# Patient Record
Sex: MALE | Race: WHITE | NOT HISPANIC OR LATINO | Employment: OTHER | URBAN - METROPOLITAN AREA
[De-identification: names, ages, dates, MRNs, and addresses within clinical notes are randomized per-mention and may not be internally consistent; named-entity substitution may affect disease eponyms.]

---

## 2023-07-23 ENCOUNTER — APPOINTMENT (EMERGENCY)
Dept: RADIOLOGY | Facility: HOSPITAL | Age: 88
DRG: 177 | End: 2023-07-23
Payer: COMMERCIAL

## 2023-07-23 ENCOUNTER — HOSPITAL ENCOUNTER (INPATIENT)
Facility: HOSPITAL | Age: 88
LOS: 7 days | Discharge: NURSING, REHAB OR LONG-TERM CARE | DRG: 177 | End: 2023-07-31
Attending: EMERGENCY MEDICINE | Admitting: STUDENT IN AN ORGANIZED HEALTH CARE EDUCATION/TRAINING PROGRAM
Payer: COMMERCIAL

## 2023-07-23 DIAGNOSIS — G93.40 ACUTE ENCEPHALOPATHY: Primary | ICD-10-CM

## 2023-07-23 DIAGNOSIS — I10 PRIMARY HYPERTENSION: ICD-10-CM

## 2023-07-23 DIAGNOSIS — K59.00 CONSTIPATION: ICD-10-CM

## 2023-07-23 DIAGNOSIS — S22.000A COMPRESSION FRACTURE OF THORACIC VERTEBRA, UNSPECIFIED THORACIC VERTEBRAL LEVEL, INITIAL ENCOUNTER (HCC): ICD-10-CM

## 2023-07-23 LAB
2HR DELTA HS TROPONIN: -2 NG/L
ALBUMIN SERPL BCP-MCNC: 3.4 G/DL (ref 3.5–5)
ALP SERPL-CCNC: 50 U/L (ref 34–104)
ALT SERPL W P-5'-P-CCNC: 38 U/L (ref 7–52)
AMMONIA PLAS-SCNC: 10 UMOL/L (ref 18–72)
ANION GAP SERPL CALCULATED.3IONS-SCNC: 7 MMOL/L
ARTERIAL PATENCY WRIST A: YES
AST SERPL W P-5'-P-CCNC: 54 U/L (ref 13–39)
BASE EXCESS BLDA CALC-SCNC: -1.7 MMOL/L
BASOPHILS # BLD AUTO: 0.13 THOUSANDS/ÂΜL (ref 0–0.1)
BASOPHILS NFR BLD AUTO: 1 % (ref 0–1)
BILIRUB SERPL-MCNC: 0.67 MG/DL (ref 0.2–1)
BILIRUB UR QL STRIP: NEGATIVE
BODY TEMPERATURE: 97.5 DEGREES FEHRENHEIT
BUN SERPL-MCNC: 28 MG/DL (ref 5–25)
CALCIUM ALBUM COR SERPL-MCNC: 9.2 MG/DL (ref 8.3–10.1)
CALCIUM SERPL-MCNC: 8.7 MG/DL (ref 8.4–10.2)
CARDIAC TROPONIN I PNL SERPL HS: 18 NG/L
CARDIAC TROPONIN I PNL SERPL HS: 20 NG/L
CHLORIDE SERPL-SCNC: 106 MMOL/L (ref 96–108)
CLARITY UR: CLEAR
CO2 SERPL-SCNC: 25 MMOL/L (ref 21–32)
COLOR UR: YELLOW
CREAT SERPL-MCNC: 1.51 MG/DL (ref 0.6–1.3)
EOSINOPHIL # BLD AUTO: 0.39 THOUSAND/ÂΜL (ref 0–0.61)
EOSINOPHIL NFR BLD AUTO: 4 % (ref 0–6)
ERYTHROCYTE [DISTWIDTH] IN BLOOD BY AUTOMATED COUNT: 14.1 % (ref 11.6–15.1)
GFR SERPL CREATININE-BSD FRML MDRD: 38 ML/MIN/1.73SQ M
GLUCOSE SERPL-MCNC: 118 MG/DL (ref 65–140)
GLUCOSE SERPL-MCNC: 158 MG/DL (ref 65–140)
GLUCOSE UR STRIP-MCNC: NEGATIVE MG/DL
HCO3 BLDA-SCNC: 22.6 MMOL/L (ref 22–28)
HCT VFR BLD AUTO: 36.6 % (ref 36.5–49.3)
HGB BLD-MCNC: 11.8 G/DL (ref 12–17)
HGB UR QL STRIP.AUTO: NEGATIVE
IMM GRANULOCYTES # BLD AUTO: 0.09 THOUSAND/UL (ref 0–0.2)
IMM GRANULOCYTES NFR BLD AUTO: 1 % (ref 0–2)
KETONES UR STRIP-MCNC: NEGATIVE MG/DL
LEUKOCYTE ESTERASE UR QL STRIP: NEGATIVE
LYMPHOCYTES # BLD AUTO: 2.32 THOUSANDS/ÂΜL (ref 0.6–4.47)
LYMPHOCYTES NFR BLD AUTO: 24 % (ref 14–44)
MAGNESIUM SERPL-MCNC: 1.9 MG/DL (ref 1.9–2.7)
MCH RBC QN AUTO: 32 PG (ref 26.8–34.3)
MCHC RBC AUTO-ENTMCNC: 32.2 G/DL (ref 31.4–37.4)
MCV RBC AUTO: 99 FL (ref 82–98)
MONOCYTES # BLD AUTO: 1.14 THOUSAND/ÂΜL (ref 0.17–1.22)
MONOCYTES NFR BLD AUTO: 12 % (ref 4–12)
NASAL CANNULA: 3.5
NEUTROPHILS # BLD AUTO: 5.72 THOUSANDS/ÂΜL (ref 1.85–7.62)
NEUTS SEG NFR BLD AUTO: 58 % (ref 43–75)
NITRITE UR QL STRIP: NEGATIVE
NRBC BLD AUTO-RTO: 0 /100 WBCS
O2 CT BLDA-SCNC: 15.8 ML/DL (ref 16–23)
OXYHGB MFR BLDA: 92.8 % (ref 94–97)
PCO2 BLDA: 36.8 MM HG (ref 36–44)
PCO2 TEMP ADJ BLDA: 35.8 MM HG (ref 36–44)
PH BLD: 7.42 [PH] (ref 7.35–7.45)
PH BLDA: 7.41 [PH] (ref 7.35–7.45)
PH UR STRIP.AUTO: 5 [PH]
PLATELET # BLD AUTO: 265 THOUSANDS/UL (ref 149–390)
PMV BLD AUTO: 12.1 FL (ref 8.9–12.7)
PO2 BLD: 66.1 MM HG (ref 75–129)
PO2 BLDA: 68.9 MM HG (ref 75–129)
POTASSIUM SERPL-SCNC: 4.9 MMOL/L (ref 3.5–5.3)
PROT SERPL-MCNC: 6.5 G/DL (ref 6.4–8.4)
PROT UR STRIP-MCNC: NEGATIVE MG/DL
RBC # BLD AUTO: 3.69 MILLION/UL (ref 3.88–5.62)
SODIUM SERPL-SCNC: 138 MMOL/L (ref 135–147)
SP GR UR STRIP.AUTO: 1.02 (ref 1–1.03)
SPECIMEN SOURCE: ABNORMAL
UROBILINOGEN UR QL STRIP.AUTO: 0.2 E.U./DL
WBC # BLD AUTO: 9.79 THOUSAND/UL (ref 4.31–10.16)

## 2023-07-23 PROCEDURE — 83735 ASSAY OF MAGNESIUM: CPT | Performed by: EMERGENCY MEDICINE

## 2023-07-23 PROCEDURE — 70450 CT HEAD/BRAIN W/O DYE: CPT

## 2023-07-23 PROCEDURE — 93005 ELECTROCARDIOGRAM TRACING: CPT

## 2023-07-23 PROCEDURE — 80053 COMPREHEN METABOLIC PANEL: CPT | Performed by: EMERGENCY MEDICINE

## 2023-07-23 PROCEDURE — 36415 COLL VENOUS BLD VENIPUNCTURE: CPT | Performed by: EMERGENCY MEDICINE

## 2023-07-23 PROCEDURE — 99285 EMERGENCY DEPT VISIT HI MDM: CPT | Performed by: EMERGENCY MEDICINE

## 2023-07-23 PROCEDURE — 81003 URINALYSIS AUTO W/O SCOPE: CPT | Performed by: EMERGENCY MEDICINE

## 2023-07-23 PROCEDURE — 85025 COMPLETE CBC W/AUTO DIFF WBC: CPT | Performed by: EMERGENCY MEDICINE

## 2023-07-23 PROCEDURE — 1124F ACP DISCUSS-NO DSCNMKR DOCD: CPT | Performed by: PSYCHIATRY & NEUROLOGY

## 2023-07-23 PROCEDURE — 71260 CT THORAX DX C+: CPT

## 2023-07-23 PROCEDURE — 84484 ASSAY OF TROPONIN QUANT: CPT | Performed by: EMERGENCY MEDICINE

## 2023-07-23 PROCEDURE — 82805 BLOOD GASES W/O2 SATURATION: CPT | Performed by: EMERGENCY MEDICINE

## 2023-07-23 PROCEDURE — G1004 CDSM NDSC: HCPCS

## 2023-07-23 PROCEDURE — 74177 CT ABD & PELVIS W/CONTRAST: CPT

## 2023-07-23 PROCEDURE — 99285 EMERGENCY DEPT VISIT HI MDM: CPT

## 2023-07-23 PROCEDURE — 36600 WITHDRAWAL OF ARTERIAL BLOOD: CPT

## 2023-07-23 PROCEDURE — 82140 ASSAY OF AMMONIA: CPT | Performed by: EMERGENCY MEDICINE

## 2023-07-23 PROCEDURE — 82948 REAGENT STRIP/BLOOD GLUCOSE: CPT

## 2023-07-23 PROCEDURE — 72125 CT NECK SPINE W/O DYE: CPT

## 2023-07-23 RX ORDER — MOXIFLOXACIN HYDROCHLORIDE 400 MG/1
400 TABLET ORAL DAILY
COMMUNITY
End: 2023-07-31

## 2023-07-23 RX ORDER — CYCLOBENZAPRINE HCL 5 MG
5 TABLET ORAL 3 TIMES DAILY PRN
COMMUNITY
End: 2023-07-31

## 2023-07-23 RX ORDER — OXYBUTYNIN CHLORIDE 5 MG/1
5 TABLET ORAL 3 TIMES DAILY
COMMUNITY

## 2023-07-23 RX ORDER — LOSARTAN POTASSIUM 50 MG/1
75 TABLET ORAL DAILY
COMMUNITY
End: 2023-07-31

## 2023-07-23 RX ORDER — ATORVASTATIN CALCIUM 10 MG/1
5 TABLET, FILM COATED ORAL DAILY
COMMUNITY

## 2023-07-23 RX ORDER — POLYETHYLENE GLYCOL 3350 17 G/17G
17 POWDER, FOR SOLUTION ORAL DAILY
COMMUNITY

## 2023-07-23 RX ORDER — LIDOCAINE 50 MG/G
1 PATCH TOPICAL DAILY
COMMUNITY

## 2023-07-23 RX ORDER — PRAZOSIN HYDROCHLORIDE 1 MG/1
1 CAPSULE ORAL
COMMUNITY

## 2023-07-23 RX ADMIN — IOHEXOL 100 ML: 350 INJECTION, SOLUTION INTRAVENOUS at 22:18

## 2023-07-24 ENCOUNTER — APPOINTMENT (INPATIENT)
Dept: RADIOLOGY | Facility: HOSPITAL | Age: 88
DRG: 177 | End: 2023-07-24
Payer: COMMERCIAL

## 2023-07-24 PROBLEM — E78.5 HYPERLIPIDEMIA: Status: ACTIVE | Noted: 2023-07-24

## 2023-07-24 PROBLEM — G93.40 ACUTE ENCEPHALOPATHY: Status: ACTIVE | Noted: 2023-07-24

## 2023-07-24 PROBLEM — R79.89 ELEVATED SERUM CREATININE: Status: ACTIVE | Noted: 2023-07-24

## 2023-07-24 PROBLEM — F41.8 DEPRESSION WITH ANXIETY: Status: ACTIVE | Noted: 2023-07-24

## 2023-07-24 PROBLEM — R74.8 ELEVATED LIVER ENZYMES: Status: ACTIVE | Noted: 2023-07-24

## 2023-07-24 PROBLEM — I25.10 CAD (CORONARY ARTERY DISEASE): Status: ACTIVE | Noted: 2023-07-24

## 2023-07-24 PROBLEM — R93.89 ABNORMAL CT SCAN: Status: ACTIVE | Noted: 2023-07-24

## 2023-07-24 PROBLEM — I10 HYPERTENSION: Status: ACTIVE | Noted: 2023-07-24

## 2023-07-24 PROBLEM — M48.50XA COMPRESSION FRACTURE OF VERTEBRAL COLUMN (HCC): Status: ACTIVE | Noted: 2023-07-24

## 2023-07-24 LAB
4HR DELTA HS TROPONIN: -2 NG/L
ANION GAP SERPL CALCULATED.3IONS-SCNC: 13 MMOL/L
ATRIAL RATE: 79 BPM
BASOPHILS # BLD AUTO: 0.12 THOUSANDS/ÂΜL (ref 0–0.1)
BASOPHILS NFR BLD AUTO: 1 % (ref 0–1)
BUN SERPL-MCNC: 27 MG/DL (ref 5–25)
CALCIUM SERPL-MCNC: 8.4 MG/DL (ref 8.4–10.2)
CARDIAC TROPONIN I PNL SERPL HS: 18 NG/L
CHLORIDE SERPL-SCNC: 107 MMOL/L (ref 96–108)
CO2 SERPL-SCNC: 18 MMOL/L (ref 21–32)
CREAT SERPL-MCNC: 1.35 MG/DL (ref 0.6–1.3)
EOSINOPHIL # BLD AUTO: 0.5 THOUSAND/ÂΜL (ref 0–0.61)
EOSINOPHIL NFR BLD AUTO: 5 % (ref 0–6)
ERYTHROCYTE [DISTWIDTH] IN BLOOD BY AUTOMATED COUNT: 14.3 % (ref 11.6–15.1)
GFR SERPL CREATININE-BSD FRML MDRD: 44 ML/MIN/1.73SQ M
GLUCOSE SERPL-MCNC: 110 MG/DL (ref 65–140)
HCT VFR BLD AUTO: 37.1 % (ref 36.5–49.3)
HGB BLD-MCNC: 11.8 G/DL (ref 12–17)
IMM GRANULOCYTES # BLD AUTO: 0.13 THOUSAND/UL (ref 0–0.2)
IMM GRANULOCYTES NFR BLD AUTO: 1 % (ref 0–2)
LYMPHOCYTES # BLD AUTO: 2.44 THOUSANDS/ÂΜL (ref 0.6–4.47)
LYMPHOCYTES NFR BLD AUTO: 24 % (ref 14–44)
MAGNESIUM SERPL-MCNC: 2.1 MG/DL (ref 1.9–2.7)
MCH RBC QN AUTO: 31.6 PG (ref 26.8–34.3)
MCHC RBC AUTO-ENTMCNC: 31.8 G/DL (ref 31.4–37.4)
MCV RBC AUTO: 99 FL (ref 82–98)
MONOCYTES # BLD AUTO: 0.98 THOUSAND/ÂΜL (ref 0.17–1.22)
MONOCYTES NFR BLD AUTO: 10 % (ref 4–12)
NEUTROPHILS # BLD AUTO: 6.15 THOUSANDS/ÂΜL (ref 1.85–7.62)
NEUTS SEG NFR BLD AUTO: 59 % (ref 43–75)
NRBC BLD AUTO-RTO: 0 /100 WBCS
P AXIS: 43 DEGREES
PLATELET # BLD AUTO: 268 THOUSANDS/UL (ref 149–390)
PMV BLD AUTO: 12 FL (ref 8.9–12.7)
POTASSIUM SERPL-SCNC: 4.7 MMOL/L (ref 3.5–5.3)
PR INTERVAL: 304 MS
PROCALCITONIN SERPL-MCNC: 0.08 NG/ML
QRS AXIS: -36 DEGREES
QRSD INTERVAL: 100 MS
QT INTERVAL: 390 MS
QTC INTERVAL: 447 MS
RBC # BLD AUTO: 3.74 MILLION/UL (ref 3.88–5.62)
SODIUM SERPL-SCNC: 138 MMOL/L (ref 135–147)
T WAVE AXIS: 9 DEGREES
VENTRICULAR RATE: 79 BPM
WBC # BLD AUTO: 10.32 THOUSAND/UL (ref 4.31–10.16)

## 2023-07-24 PROCEDURE — 84145 PROCALCITONIN (PCT): CPT | Performed by: NURSE PRACTITIONER

## 2023-07-24 PROCEDURE — 83735 ASSAY OF MAGNESIUM: CPT | Performed by: NURSE PRACTITIONER

## 2023-07-24 PROCEDURE — 97167 OT EVAL HIGH COMPLEX 60 MIN: CPT

## 2023-07-24 PROCEDURE — 80048 BASIC METABOLIC PNL TOTAL CA: CPT | Performed by: NURSE PRACTITIONER

## 2023-07-24 PROCEDURE — 87081 CULTURE SCREEN ONLY: CPT | Performed by: STUDENT IN AN ORGANIZED HEALTH CARE EDUCATION/TRAINING PROGRAM

## 2023-07-24 PROCEDURE — 85025 COMPLETE CBC W/AUTO DIFF WBC: CPT | Performed by: NURSE PRACTITIONER

## 2023-07-24 PROCEDURE — 84484 ASSAY OF TROPONIN QUANT: CPT | Performed by: NURSE PRACTITIONER

## 2023-07-24 PROCEDURE — 99222 1ST HOSP IP/OBS MODERATE 55: CPT | Performed by: STUDENT IN AN ORGANIZED HEALTH CARE EDUCATION/TRAINING PROGRAM

## 2023-07-24 PROCEDURE — 70551 MRI BRAIN STEM W/O DYE: CPT

## 2023-07-24 PROCEDURE — 97163 PT EVAL HIGH COMPLEX 45 MIN: CPT

## 2023-07-24 PROCEDURE — 92610 EVALUATE SWALLOWING FUNCTION: CPT

## 2023-07-24 PROCEDURE — 97110 THERAPEUTIC EXERCISES: CPT

## 2023-07-24 PROCEDURE — 93010 ELECTROCARDIOGRAM REPORT: CPT | Performed by: INTERNAL MEDICINE

## 2023-07-24 RX ORDER — SODIUM CHLORIDE, SODIUM LACTATE, POTASSIUM CHLORIDE, CALCIUM CHLORIDE 600; 310; 30; 20 MG/100ML; MG/100ML; MG/100ML; MG/100ML
60 INJECTION, SOLUTION INTRAVENOUS CONTINUOUS
Status: DISCONTINUED | OUTPATIENT
Start: 2023-07-24 | End: 2023-07-25

## 2023-07-24 RX ORDER — POLYETHYLENE GLYCOL 3350 17 G/17G
17 POWDER, FOR SOLUTION ORAL DAILY
Status: DISCONTINUED | OUTPATIENT
Start: 2023-07-24 | End: 2023-07-31 | Stop reason: HOSPADM

## 2023-07-24 RX ORDER — FAMOTIDINE 10 MG/ML
20 INJECTION, SOLUTION INTRAVENOUS EVERY 12 HOURS SCHEDULED
Status: DISCONTINUED | OUTPATIENT
Start: 2023-07-24 | End: 2023-07-25

## 2023-07-24 RX ORDER — ATORVASTATIN CALCIUM 10 MG/1
5 TABLET, FILM COATED ORAL DAILY
Status: DISCONTINUED | OUTPATIENT
Start: 2023-07-24 | End: 2023-07-31 | Stop reason: HOSPADM

## 2023-07-24 RX ORDER — ENOXAPARIN SODIUM 100 MG/ML
30 INJECTION SUBCUTANEOUS DAILY
Status: DISCONTINUED | OUTPATIENT
Start: 2023-07-24 | End: 2023-07-31 | Stop reason: HOSPADM

## 2023-07-24 RX ORDER — CEFTRIAXONE 1 G/50ML
1000 INJECTION, SOLUTION INTRAVENOUS EVERY 24 HOURS
Status: DISCONTINUED | OUTPATIENT
Start: 2023-07-24 | End: 2023-07-26

## 2023-07-24 RX ORDER — OXYBUTYNIN CHLORIDE 5 MG/1
5 TABLET ORAL 3 TIMES DAILY
Status: DISCONTINUED | OUTPATIENT
Start: 2023-07-24 | End: 2023-07-31 | Stop reason: HOSPADM

## 2023-07-24 RX ORDER — PRAZOSIN HYDROCHLORIDE 1 MG/1
1 CAPSULE ORAL
Status: DISCONTINUED | OUTPATIENT
Start: 2023-07-24 | End: 2023-07-31 | Stop reason: HOSPADM

## 2023-07-24 RX ORDER — HYDRALAZINE HYDROCHLORIDE 20 MG/ML
5 INJECTION INTRAMUSCULAR; INTRAVENOUS EVERY 4 HOURS PRN
Status: DISCONTINUED | OUTPATIENT
Start: 2023-07-24 | End: 2023-07-31 | Stop reason: HOSPADM

## 2023-07-24 RX ORDER — ACETAMINOPHEN 325 MG/1
650 TABLET ORAL EVERY 6 HOURS PRN
Status: DISCONTINUED | OUTPATIENT
Start: 2023-07-24 | End: 2023-07-25

## 2023-07-24 RX ADMIN — FAMOTIDINE 20 MG: 10 INJECTION, SOLUTION INTRAVENOUS at 02:48

## 2023-07-24 RX ADMIN — SODIUM CHLORIDE, SODIUM LACTATE, POTASSIUM CHLORIDE, AND CALCIUM CHLORIDE 60 ML/HR: .6; .31; .03; .02 INJECTION, SOLUTION INTRAVENOUS at 22:44

## 2023-07-24 RX ADMIN — CEFTRIAXONE 1000 MG: 1 INJECTION, SOLUTION INTRAVENOUS at 02:48

## 2023-07-24 RX ADMIN — SODIUM CHLORIDE, SODIUM LACTATE, POTASSIUM CHLORIDE, AND CALCIUM CHLORIDE 60 ML/HR: .6; .31; .03; .02 INJECTION, SOLUTION INTRAVENOUS at 03:42

## 2023-07-24 RX ADMIN — FAMOTIDINE 20 MG: 10 INJECTION, SOLUTION INTRAVENOUS at 10:31

## 2023-07-24 RX ADMIN — ENOXAPARIN SODIUM 30 MG: 30 INJECTION SUBCUTANEOUS at 10:32

## 2023-07-24 NOTE — ASSESSMENT & PLAN NOTE
Patient presented with lethargy started on Sunday afternoon from NH per staff. Pt was found under his wheel chair on Saturday night,unwitnessed. Patient was started on Flexeril 5mg po TID prn on Saturday after the fall,taken 6 doses total so far. Toxic Encephalopathy likely due to newly started Flexeril, evidenced by lethargy, treated with CT head, frequent neuro checks, IVF, UA and lab monitoring. · CTH, C spine no acute findings  · CT C/A/P showed -  No acute traumatic injury. Bibasilar dependent atelectasis and scattered lower lobe predominant hazy pulmonary infiltrates noted which could be related to aspiration. Trace right pleural effusion. · UA clean, repeat urine culture without any growth  · Ammonia normal   · WBC normal, no bands. Afebrile. · Was on oxygen 3L initially. Does not use O2 at baseline. Now improved to baseline/room air  · B12, folate, TSH, vitamin D unremarkable  · MRI of brain showed no signs of ischemia  Currently seems to be at baseline , does seem to have some confusion/sundowning, suspect underlying early dementia  · Remains afebrile without signs and symptoms of infection. Oxygen requirement has improved, now breathing comfortably on room air. ·  Procalcitonin negative  · Aspiration precaution  · Continue with current stage III dysphagia diet  · PT OT as tolerated  · Neurology consulted, no additional work-up recommended at this time. Agree altered mentation on presentation likely due to Flexeril. Noted with delirium overnight secondary to sundowning  · Avoid sedating medications.   · Delirium precaution  · Monitor for constipation, urinary retention  · Frequent reorientation  · Melatonin nightly, attempt to maintain sleep-wake cycle  · Received Risperdal 0.25 mg nightly for short duration to help with confusion/delirium good response, will discontinue at present  · Out of bed to chair, activity as tolerated -continues to require significant assist  · Recommend outpatient neurology follow-up for formal memory testing

## 2023-07-24 NOTE — PHYSICAL THERAPY NOTE
PHYSICAL THERAPY EVALUATION/TREATMENT     07/24/23 1100   Note Type   Note type Evaluation   Pain Assessment   Pain Assessment Tool Hooper-Baker FACES   Hooper-Baker FACES Pain Rating 0   Restrictions/Precautions   Other Precautions Chair Alarm; Bed Alarm;Cognitive; Fall Risk   Home Living   Type of Home SNF  ([facility])   Prior Function   Level of [place] Needs assistance with ADLs; Needs assistance with [address] staff   Receives Help From Personal care attendant   IADLs Family/Friend/Other provides transportation; Family/Friend/Other provides meals; Family/Friend/Other provides medication management   Comments Patient is a poor historian and unable to offer prior level of function information. As per documentation patient was a recent admit to SNF   General   Additional Pertinent History Chart reviewed, patient admitted with acute encephalopathy.   Patient now presents as confused with decreased coordination on right extremities and leaning to the right with sitting and standing activity   Family/Caregiver Present No   Cognition   Overall Cognitive Status Impaired   Arousal/Participation Cooperative   Attention Difficulty attending to directions   Orientation Level Oriented to person   Following Commands Follows one step commands inconsistently   Subjective   Subjective Patient without pain complaint   RLE Assessment   RLE Assessment   (Range of motion within functional limits, strength 3+/5)   LLE Assessment   LLE Assessment   (Range of motion within functional limits, strength 3+/5)   Coordination   Movements are Fluid and Coordinated 0   Coordination and Movement Description Decreased coordination right upper and lower extremity with all active movement, transfers and standing   Bed Mobility   Rolling R 2  Maximal assistance   Additional items Assist x 2   Rolling L 2  Maximal assistance   Additional items Assist x 2   Supine to Sit 2  Maximal assistance   Additional items Assist x 2   Sit to Supine 2  Maximal assistance   Additional items Assist x 2   Transfers   Sit to Stand 2  Maximal assistance   Additional items Assist x 2   Stand to Sit 2  Maximal assistance   Additional items Assist x 2   Additional Comments Unable to effectively weight-bear with loss of balance to the right side requiring max assist to maintain upright standing therefore gait activity unable to be assessed at this time   Balance   Static Sitting Poor -   Dynamic Sitting Poor -   Static Standing Poor -   Dynamic Standing Poor -   Activity Tolerance   Activity Tolerance Treatment limited secondary to medical complications (Comment); Patient limited by fatigue  (Limited by weakness, cognition and poor coordination on the right extremities)   Nurse Made Aware Yes   Assessment   Prognosis Good   Problem List Decreased range of motion;Decreased endurance; Impaired balance;Decreased coordination;Decreased mobility; Decreased cognition; Impaired judgement;Decreased safety awareness  (Decreased coordination)   Assessment Patient seen for Physical Therapy evaluation. Patient admitted with Acute encephalopathy. Comorbidities affecting patient's physical performance include: . Personal factors affecting patient at time of initial evaluation include: inability to ambulate household distances, inability to navigate community distances, inability to navigate level surfaces without external assistance, inability to perform dynamic tasks in community, positive fall history, inability to perform physical activity, inability to perform ADLS and inability to perform IADLS . Prior to admission, patient was requiring assist for functional mobility with WC/WAlker?, requiring assist for ADLS, requiring assist for IADLS and in short term rehab.   Please find objective findings from Physical Therapy assessment regarding body systems outlined above with impairments and limitations including weakness, decreased ROM, impaired balance, decreased endurance, impaired coordination, gait deviations, decreased activity tolerance, decreased functional mobility tolerance, fall risk and decreased cognition. The Barthel Index was used as a functional outcome tool presenting with a score of Barthel Index Score: 5 today indicating marked limitations of functional mobility and ADLS. Patient's clinical presentation is currently unstable/unpredictable as seen in patient's presentation of vital sign response, varying levels of cognitive performance, increased fall risk, new onset of impairment of functional mobility, decreased endurance and new onset of weakness. Pt would benefit from continued Physical Therapy treatment to address deficits as defined above and maximize level of functional mobility. As demonstrated by objective findings, the assigned level of complexity for this evaluation is high. The patient's AM-[facility] Basic Mobility Inpatient Short Form Raw Score is 6. A Raw score of less than or equal to 16 suggests the patient may benefit from discharge to post-acute rehabilitation services. Please also refer to the recommendation of the Physical Therapist for safe discharge planning. Goals   Patient Goals None stated, patient confused at times   STG Expiration Date 07/31/23   Short Term Goal #1 Transfers and gait with roller walker with mod assist   Short Term Goal #2 Gait endurance to 30 feet   LTG Expiration Date 08/07/23   Long Term Goal #1 Normalize coordination of right extremities   Long Term Goal #2 Transfers and gait with roller walker with min assist of 1 for 50 feet   Plan   Treatment/Interventions ADL retraining;Functional transfer training;LE strengthening/ROM; Therapeutic exercise; Endurance training;Cognitive reorientation;Patient/family training;Equipment eval/education; Bed mobility;Gait training; Compensatory technique education   PT Frequency Other (Comment)  (Daily)   Recommendation   PT Discharge Recommendation Post acute rehabilitation services   AM-PAC Basic Mobility Inpatient   Turning in Flat Bed Without Bedrails 1   Lying on Back to Sitting on Edge of Flat Bed Without Bedrails 1   Moving Bed to Chair 1   Standing Up From Chair Using Arms 1   Walk in Room 1   Climb 3-5 Stairs With Railing 1   Basic Mobility Inpatient Raw Score 6   Turning Head Towards Sound 3   Follow Simple Instructions 2   Low Function Basic Mobility Raw Score  11   Low Function Basic Mobility Standardized Score  16.55   Highest Level Of Mobility   -[facility] Goal 2: Bed activities/Dependent transfer   -[facility] Achieved 3: Sit at edge of bed   Modified [place] Scale   Modified [place] Scale 4   Barthel Index   Feeding 0   Bathing 0   Grooming Score 0   Dressing Score 0   Bladder Score 0   Bowels Score 0   Toilet Use Score 0   Transfers (Bed/Chair) Score 5   Mobility (Level Surface) Score 0   Stairs Score 0   Barthel Index Score 5   Additional Treatment Session   Start Time 1045   End Time 1100   Treatment Assessment S: Patient without pain complaint O: Bilateral lower extremity exercise completed as listed below A: Dr. Krunal Puri aware of decreased coordination on right extremities and sitting balance being poor minus with leaning to the right. Further work-up required to identify etiology of symptoms. Patient will benefit from continued physical therapy with progression as tolerated   Exercises   Hamstring Stretch Supine;5 reps;PROM; Bilateral   Heelslides Supine;10 reps;AAROM; Bilateral   Hip Abduction Supine;10 reps;Bilateral;AAROM   Heel Cord Stretch Supine;5 reps;PROM; Bilateral   Balance training  Sitting balance activity completed with max assist to maintain upright posturing due to leaning to the right, weight shifting activity completed as well as lower extremity exercise without lumbar support   Licensure   [place] License Number  Qiana Maynard PT [id number]

## 2023-07-24 NOTE — ASSESSMENT & PLAN NOTE
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   · Monitor for back pain

## 2023-07-24 NOTE — ASSESSMENT & PLAN NOTE
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 Denies pain. No tenderness on exam.  · Tylenol, lidocaine patch.   · Monitor for back pain- continues to deny pain  · PT/OT as tolerated

## 2023-07-24 NOTE — ASSESSMENT & PLAN NOTE
Patient presents with lethargy started on Sunday afternoon from NH per staff. Pt was found under his wheel chair on Saturday night,unwitnessed. Patient was started on Flexeril 5mg po TID prn on Saturday after the fall,taken 6 doses total so far. · CTH,C spine no acute findings  · CT C/A/P showed -  No acute traumatic injury. Bibasilar dependent atelectasis and scattered lower lobe predominant hazy pulmonary infiltrates noted which could be related to aspiration. Trace right pleural effusion. · UA clean  · Ammonia normal  · ABG shows mild hypoxia  · WBC normal,no bands. Afebrile. · On Oxygen 3L currently. Does not use O2 at baseline. Satting low 90s  · Suspect 2/2 to flexeril, unlikely due to ? Aspiration pneumonia  · Hold Flexeril  · Consider MRI of brain to r/o central  Etiology if family agreeable  · Neuro checks  · Start rocephin for ?  Aspiration pneumonia  · NPO till awake,alert  · Gentle IVF  · PT OT ST

## 2023-07-24 NOTE — ASSESSMENT & PLAN NOTE
Cr 1.51 >1.35 > 1.11 > 1.07  · Unclear baseline  · Improved with gentle IVF  · Continue to trend  · Monitor for retention

## 2023-07-24 NOTE — PLAN OF CARE
Problem: Potential for Falls  Goal: Patient will remain free of falls  Description: INTERVENTIONS:  - Educate patient/family on patient safety including physical limitations  - Instruct patient to call for assistance with activity   - Consult OT/PT to assist with strengthening/mobility   - Keep Call bell within reach  - Keep bed low and locked with side rails adjusted as appropriate  - Keep care items and personal belongings within reach  - Initiate and maintain comfort rounds  - Make Fall Risk Sign visible to staff  - Offer Toileting every 2 Hours, in advance of need  - Initiate/Maintain bed alarm  - Obtain necessary fall risk management equipment: slipper socks  - Apply yellow socks and bracelet for high fall risk patients  - Consider moving patient to room near nurses station  Outcome: Progressing     Problem: Prexisting or High Potential for Compromised Skin Integrity  Goal: Skin integrity is maintained or improved  Description: INTERVENTIONS:  - Identify patients at risk for skin breakdown  - Assess and monitor skin integrity  - Assess and monitor nutrition and hydration status  - Monitor labs   - Assess for incontinence   - Turn and reposition patient  - Assist with mobility/ambulation  - Relieve pressure over bony prominences  - Avoid friction and shearing  - Provide appropriate hygiene as needed including keeping skin clean and dry  - Evaluate need for skin moisturizer/barrier cream  - Collaborate with interdisciplinary team   - Patient/family teaching  - Consider wound care consult   Outcome: Progressing     Problem: MOBILITY - ADULT  Goal: Maintain or return to baseline ADL function  Description: INTERVENTIONS:  -  Assess patient's ability to carry out ADLs; assess patient's baseline for ADL function and identify physical deficits which impact ability to perform ADLs (bathing, care of mouth/teeth, toileting, grooming, dressing, etc.)  - Assess/evaluate cause of self-care deficits   - Assess range of motion  - Assess patient's mobility; develop plan if impaired  - Assess patient's need for assistive devices and provide as appropriate  - Encourage maximum independence but intervene and supervise when necessary  - Involve family in performance of ADLs  - Assess for home care needs following discharge   - Consider OT consult to assist with ADL evaluation and planning for discharge  - Provide patient education as appropriate  Outcome: Progressing  Goal: Maintains/Returns to pre admission functional level  Description: INTERVENTIONS:  - Perform BMAT or MOVE assessment daily.   - Set and communicate daily mobility goal to care team and patient/family/caregiver. - Collaborate with rehabilitation services on mobility goals if consulted  - Perform Range of Motion 3 times a day. - Reposition patient every 2 hours.   - Dangle patient 3 times a day  - Stand patient 3 times a day  - Ambulate patient 3 times a day  - Out of bed to chair 3 times a day   - Out of bed for meals 3 times a day  - Out of bed for toileting  - Record patient progress and toleration of activity level   Outcome: Progressing     Problem: PAIN - ADULT  Goal: Verbalizes/displays adequate comfort level or baseline comfort level  Description: Interventions:  - Encourage patient to monitor pain and request assistance  - Assess pain using appropriate pain scale  - Administer analgesics based on type and severity of pain and evaluate response  - Implement non-pharmacological measures as appropriate and evaluate response  - Consider cultural and social influences on pain and pain management  - Notify physician/advanced practitioner if interventions unsuccessful or patient reports new pain  Outcome: Progressing     Problem: INFECTION - ADULT  Goal: Absence or prevention of progression during hospitalization  Description: INTERVENTIONS:  - Assess and monitor for signs and symptoms of infection  - Monitor lab/diagnostic results  - Monitor all insertion sites, i.e. indwelling lines, tubes, and drains  - Monitor endotracheal if appropriate and nasal secretions for changes in amount and color  - [place] appropriate cooling/warming therapies per order  - Administer medications as ordered  - Instruct and encourage patient and family to use good hand hygiene technique  - Identify and instruct in appropriate isolation precautions for identified infection/condition  Outcome: Progressing  Goal: Absence of fever/infection during neutropenic period  Description: INTERVENTIONS:  - Monitor WBC    Outcome: Progressing     Problem: SAFETY ADULT  Goal: Patient will remain free of falls  Description: INTERVENTIONS:  - Educate patient/family on patient safety including physical limitations  - Instruct patient to call for assistance with activity   - Consult OT/PT to assist with strengthening/mobility   - Keep Call bell within reach  - Keep bed low and locked with side rails adjusted as appropriate  - Keep care items and personal belongings within reach  - Initiate and maintain comfort rounds  - Make Fall Risk Sign visible to staff  - Offer Toileting every 3 Hours, in advance of need  - Initiate/Maintain bed alarm  - Obtain necessary fall risk management equipment: slipper socks  - Apply yellow socks and bracelet for high fall risk patients  - Consider moving patient to room near nurses station  Outcome: Progressing  Goal: Maintain or return to baseline ADL function  Description: INTERVENTIONS:  -  Assess patient's ability to carry out ADLs; assess patient's baseline for ADL function and identify physical deficits which impact ability to perform ADLs (bathing, care of mouth/teeth, toileting, grooming, dressing, etc.)  - Assess/evaluate cause of self-care deficits   - Assess range of motion  - Assess patient's mobility; develop plan if impaired  - Assess patient's need for assistive devices and provide as appropriate  - Encourage maximum independence but intervene and supervise when necessary  - Involve family in performance of ADLs  - Assess for home care needs following discharge   - Consider OT consult to assist with ADL evaluation and planning for discharge  - Provide patient education as appropriate  Outcome: Progressing  Goal: Maintains/Returns to pre admission functional level  Description: INTERVENTIONS:  - Perform BMAT or MOVE assessment daily.   - Set and communicate daily mobility goal to care team and patient/family/caregiver. - Collaborate with rehabilitation services on mobility goals if consulted  - Perform Range of Motion 3 times a day. - Reposition patient every 2 hours. - Dangle patient 3 times a day  - Stand patient 3 times a day  - Ambulate patient 3 times a day  - Out of bed to chair 3 times a day   - Out of bed for meals 3 times a day  - Out of bed for toileting  - Record patient progress and toleration of activity level   Outcome: Progressing     Problem: DISCHARGE PLANNING  Goal: Discharge to home or other facility with appropriate resources  Description: INTERVENTIONS:  - Identify barriers to discharge w/patient and caregiver  - Arrange for needed discharge resources and transportation as appropriate  - Identify discharge learning needs (meds, wound care, etc.)  - Arrange for interpretive services to assist at discharge as needed  - Refer to Case Management Department for coordinating discharge planning if the patient needs post-hospital services based on physician/advanced practitioner order or complex needs related to functional status, cognitive ability, or social support system  Outcome: Progressing     Problem: Knowledge Deficit  Goal: Patient/family/caregiver demonstrates understanding of disease process, treatment plan, medications, and discharge instructions  Description: Complete learning assessment and assess knowledge base.   Interventions:  - Provide teaching at level of understanding  - Provide teaching via preferred learning methods  Outcome: Progressing

## 2023-07-24 NOTE — ED PROVIDER NOTES
History  Chief Complaint   Patient presents with   • Altered Mental Status     Patients family requested patient to come in from [facility], has been on flexeril 5mg last given at 13:00, also patient fell last night out of wheel chair and was under wheel chair, was assessed at [facility] and not sent in      Patient is a 60-year-old male recently admitted to [address] [place] [address], sent in for evaluation of mental status changes. Patient was noted to be somnolent this afternoon, sleeping while he was attempting to eat. Patient also suffered a fall out of his wheelchair yesterday, where he was stuck underneath it, per family. Although patient primarily speaks Chris, patient's family who is at the bedside states that he is typically awake, alert, able to carry on a conversation. Patient is difficult to arouse at the time of my initial evaluation, and mumbles when speaking. He was recently started on a course of Flexeril in rehab after diagnosis of sprained ribs, which he sustained after mechanical fall. History provided by:  Patient  History limited by:  Acuity of condition and mental status change   used: No        Prior to Admission Medications   Prescriptions Last Dose Informant Patient Reported?  Taking?   atorvastatin (LIPITOR) 10 mg tablet 7/22/2023  Yes Yes   Sig: Take 5 mg by mouth daily   cyclobenzaprine (FLEXERIL) 5 mg tablet 7/23/2023  Yes Yes   Sig: Take 5 mg by mouth 3 (three) times a day as needed for muscle spasms   lidocaine (LIDODERM) 5 % 7/23/2023  Yes Yes   Sig: Apply 1 patch topically daily Remove & Discard patch within 12 hours or as directed by MD   losartan (COZAAR) 50 mg tablet 7/23/2023 at [facility]  Yes Yes   Sig: Take 75 mg by mouth daily   magnesium hydroxide (MILK OF MAGNESIA) 400 mg/5 mL oral suspension Unknown  Yes No   Sig: Take by mouth daily as needed for constipation   moxifloxacin (AVELOX) 400 MG tablet 7/23/2023  Yes Yes   Sig: Take 400 mg by mouth daily oxybutynin (DITROPAN) 5 mg tablet 7/22/2023  Yes Yes   Sig: Take 5 mg by mouth 3 (three) times a day   polyethylene glycol (MIRALAX) 17 g packet 7/23/2023  Yes Yes   Sig: Take 17 g by mouth daily   prazosin (MINIPRESS) 1 mg capsule 7/23/2023  Yes Yes   Sig: Take 1 mg by mouth daily at bedtime   sertraline (ZOLOFT) 50 mg tablet 7/22/2023  Yes Yes   Sig: Take 50 mg by mouth daily      Facility-Administered Medications: None       Past Medical History:   Diagnosis Date   • Anxiety    • Atherosclerotic coronary vascular disease    • Depressive disorder    • Hyperlipidemia    • Hypertension        History reviewed. No pertinent surgical history. History reviewed. No pertinent family history. I have reviewed and agree with the history as documented. E-Cigarette/Vaping   • E-Cigarette Use Never User      E-Cigarette/Vaping Substances     Social History     Tobacco Use   • Smoking status: Never   • Smokeless tobacco: Never   Vaping Use   • Vaping Use: Never used   Substance Use Topics   • Alcohol use: Not Currently   • Drug use: Never       Review of Systems   Unable to perform ROS: Mental status change   Constitutional: Negative for chills and fever. Respiratory: Negative for cough, shortness of breath and wheezing. Cardiovascular: Negative for chest pain and palpitations. Gastrointestinal: Negative for abdominal pain, constipation, diarrhea, nausea and vomiting. Genitourinary: Negative for dysuria, flank pain, hematuria and urgency. Musculoskeletal: Negative for back pain. Skin: Negative for color change and rash. All other systems reviewed and are negative. Physical Exam  Physical Exam  Vitals and nursing note reviewed. Constitutional:       Appearance: He is well-developed. HENT:      Head: Normocephalic and atraumatic. Eyes:      Pupils: Pupils are equal, round, and reactive to light. Cardiovascular:      Rate and Rhythm: Normal rate and regular rhythm.       Heart sounds: Normal heart sounds. Pulmonary:      Effort: Pulmonary effort is normal.      Breath sounds: Normal breath sounds. Abdominal:      General: Bowel sounds are normal. There is no distension. Palpations: Abdomen is soft. There is no mass. Tenderness: There is no abdominal tenderness. There is no guarding or rebound. Skin:     General: Skin is warm and dry. Capillary Refill: Capillary refill takes less than 2 seconds. Neurological:      Mental Status: He is oriented to person, place, and time. He is lethargic. GCS: GCS eye subscore is 4. GCS verbal subscore is 3. GCS motor subscore is 6. Psychiatric:         Mood and Affect: Mood is depressed. Behavior: Behavior normal.         Thought Content:  Thought content normal.         Judgment: Judgment normal.         Vital Signs  ED Triage Vitals [07/23/23 2009]   Temperature Pulse Respirations Blood Pressure SpO2   97.6 °F (36.4 °C) 80 16 162/70 93 %      Temp Source Heart Rate Source Patient Position - Orthostatic VS BP Location FiO2 (%)   Tympanic Monitor Lying Right arm --      Pain Score       --           Vitals:    07/23/23 2009 07/23/23 2145 07/23/23 2230 07/23/23 2245   BP: 162/70 163/70 (!) 198/84 (!) 173/71   Pulse: 80 76 86 84   Patient Position - Orthostatic VS: Lying Lying Lying Lying         Visual Acuity      ED Medications  Medications   iohexol (OMNIPAQUE) 350 MG/ML injection (SINGLE-DOSE) 100 mL (100 mL Intravenous Given 7/23/23 2218)       Diagnostic Studies  Results Reviewed     Procedure Component Value Units Date/Time    HS Troponin I 4hr [[id number]]     Lab Status: No result Specimen: Blood     HS Troponin I 2hr [[id number]]     Lab Status: No result Specimen: Blood     HS Troponin 0hr (reflex protocol) [[id number]]  (Normal) Collected: 07/23/23 2053    Lab Status: Final result Specimen: Blood from Arm, Right Updated: 07/23/23 2126     hs TnI 0hr 20 ng/L     Ammonia [[id number]]  (Abnormal) Collected: 07/23/23 2053    Lab Status: Final result Specimen: Blood from Arm, Right Updated: 07/23/23 2118     Ammonia 10 umol/L     Comprehensive metabolic panel [[id number]]  (Abnormal) Collected: 07/23/23 2053    Lab Status: Final result Specimen: Blood from Arm, Right Updated: 07/23/23 2118     Sodium 138 mmol/L      Potassium 4.9 mmol/L      Chloride 106 mmol/L      CO2 25 mmol/L      ANION GAP 7 mmol/L      BUN 28 mg/dL      Creatinine 1.51 mg/dL      Glucose 118 mg/dL      Calcium 8.7 mg/dL      Corrected Calcium 9.2 mg/dL      AST 54 U/L      ALT 38 U/L      Alkaline Phosphatase 50 U/L      Total Protein 6.5 g/dL      Albumin 3.4 g/dL      Total Bilirubin 0.67 mg/dL      eGFR 38 ml/min/1.73sq m     Narrative:      [place] guidelines for Chronic Kidney Disease (CKD):   •  Stage 1 with normal or high GFR (GFR > 90 mL/min/1.73 square meters)  •  Stage 2 Mild CKD (GFR = 60-89 mL/min/1.73 square meters)  •  Stage 3A Moderate CKD (GFR = 45-59 mL/min/1.73 square meters)  •  Stage 3B Moderate CKD (GFR = 30-44 mL/min/1.73 square meters)  •  Stage 4 Severe CKD (GFR = 15-29 mL/min/1.73 square meters)  •  Stage 5 End Stage CKD (GFR <15 mL/min/1.73 square meters)  Note: GFR calculation is accurate only with a steady state creatinine    Magnesium [[id number]]  (Normal) Collected: 07/23/23 2053    Lab Status: Final result Specimen: Blood from Arm, Right Updated: 07/23/23 2118     Magnesium 1.9 mg/dL     Blood gas, arterial [[id number]]  (Abnormal) Collected: 07/23/23 2100    Lab Status: Final result Specimen: Blood, Arterial from Radial, Left Updated: 07/23/23 2106     pH, Arterial 7.406     PH ART TC 7.415     pCO2, Arterial 36.8 mm Hg      PCO2 (TC) Arterial 35.8 mm Hg      pO2, Arterial 68.9 mm Hg      PO2 (TC) Arterial 66.1 mm Hg      HCO3, Arterial 22.6 mmol/L      Base Excess, Arterial -1.7 mmol/L      O2 Content, Arterial 15.8 mL/dL      O2 HGB,Arterial  92.8 %      SOURCE Radial, Left     LANCE TEST Yes     Temperature 97.5 Degrees Fehrenheit      Nasal Cannula 3.5    CBC and differential [[id number]]  (Abnormal) Collected: 07/23/23 2053    Lab Status: Final result Specimen: Blood from Arm, Right Updated: 07/23/23 2100     WBC 9.79 Thousand/uL      RBC 3.69 Million/uL      Hemoglobin 11.8 g/dL      Hematocrit 36.6 %      MCV 99 fL      MCH 32.0 pg      MCHC 32.2 g/dL      RDW 14.1 %      MPV 12.1 fL      Platelets 703 Thousands/uL      nRBC 0 /100 WBCs      Neutrophils Relative 58 %      Immat GRANS % 1 %      Lymphocytes Relative 24 %      Monocytes Relative 12 %      Eosinophils Relative 4 %      Basophils Relative 1 %      Neutrophils Absolute 5.72 Thousands/µL      Immature Grans Absolute 0.09 Thousand/uL      Lymphocytes Absolute 2.32 Thousands/µL      Monocytes Absolute 1.14 Thousand/µL      Eosinophils Absolute 0.39 Thousand/µL      Basophils Absolute 0.13 Thousands/µL     UA w Reflex to Microscopic w Reflex to Culture [[id number]]     Lab Status: No result Specimen: Urine     Fingerstick Glucose (POCT) [[id number]]  (Abnormal) Collected: 07/23/23 2008    Lab Status: Final result Updated: 07/23/23 2009     POC Glucose 158 mg/dl                  CT head without contrast    (Results Pending)   CT cervical spine without contrast    (Results Pending)   CT chest abdomen pelvis w contrast    (Results Pending)              Procedures  ECG 12 Lead Documentation Only    Date/Time: 7/23/2023 8:10 PM    Performed by: Mandy Coker DO  Authorized by: Mandy Coker DO    Indications / Diagnosis:  Ms changes  ECG reviewed by me, the ED Provider: yes    Patient location:  ED  Previous ECG:     Previous ECG:  Unavailable    Comparison to cardiac monitor: Yes    Interpretation:     Interpretation: non-specific    Rate:     ECG rate:  79bpm    ECG rate assessment: normal    Rhythm:     Rhythm: sinus rhythm    Ectopy:     Ectopy: none    QRS:     QRS axis:  Left  Conduction:     Conduction: normal    ST segments:     ST segments: Normal  T waves:     T waves: normal               ED Course                               SBIRT 22yo+    Flowsheet Row Most Recent Value   Initial Alcohol Screen: US AUDIT-C     1. How often do you have a drink containing alcohol? 0 Filed at: 07/23/2023 2147   2. How many drinks containing alcohol do you have on a typical day you are drinking? 0 Filed at: 07/23/2023 2147   3a. Male UNDER 65: How often do you have five or more drinks on one occasion? 0 Filed at: 07/23/2023 2147   Audit-C Score 0 Filed at: 07/23/2023 2147   STEPHAN: How many times in the past year have you. .. Used an illegal drug or used a prescription medication for non-medical reasons? Never Filed at: 07/23/2023 2147                    Medical Decision Making  Urinalysis and CT reports ordered and pending. Patient will be signed out to oncoming physician who will evaluate reports and admit patient for encephalopathy if no traumatic pathology is identified. Amount and/or Complexity of Data Reviewed  Labs: ordered. Radiology: ordered. Risk  Prescription drug management. Disposition  Final diagnoses:   Acute encephalopathy     Time reflects when diagnosis was documented in both MDM as applicable and the Disposition within this note     Time User Action Codes Description Comment    7/23/2023 11:05 PM Blaze Simon Add [G93.40] Acute encephalopathy       ED Disposition     None      Follow-up Information    None         Patient's Medications   Discharge Prescriptions    No medications on file       No discharge procedures on file.     PDMP Review     None          ED Provider  Electronically Signed by           Blaze Simon DO  07/23/23 6280

## 2023-07-24 NOTE — PLAN OF CARE
Problem: Potential for Falls  Goal: Patient will remain free of falls  Description: INTERVENTIONS:  - Educate patient/family on patient safety including physical limitations  - Instruct patient to call for assistance with activity   - Consult OT/PT to assist with strengthening/mobility   - Keep Call bell within reach  - Keep bed low and locked with side rails adjusted as appropriate  - Keep care items and personal belongings within reach  - Initiate and maintain comfort rounds  - Make Fall Risk Sign visible to staff  - Offer Toileting every 2 Hours, in advance of need  - Initiate/Maintain bed alarm  - Obtain necessary fall risk management equipment  - Apply yellow socks and bracelet for high fall risk patients  - Consider moving patient to room near nurses station  Outcome: Progressing     Problem: Prexisting or High Potential for Compromised Skin Integrity  Goal: Skin integrity is maintained or improved  Description: INTERVENTIONS:  - Identify patients at risk for skin breakdown  - Assess and monitor skin integrity  - Assess and monitor nutrition and hydration status  - Monitor labs   - Assess for incontinence   - Turn and reposition patient  - Assist with mobility/ambulation  - Relieve pressure over bony prominences  - Avoid friction and shearing  - Provide appropriate hygiene as needed including keeping skin clean and dry  - Evaluate need for skin moisturizer/barrier cream  - Collaborate with interdisciplinary team   - Patient/family teaching  - Consider wound care consult   Outcome: Progressing     Problem: MOBILITY - ADULT  Goal: Maintain or return to baseline ADL function  Description: INTERVENTIONS:  -  Assess patient's ability to carry out ADLs; assess patient's baseline for ADL function and identify physical deficits which impact ability to perform ADLs (bathing, care of mouth/teeth, toileting, grooming, dressing, etc.)  - Assess/evaluate cause of self-care deficits   - Assess range of motion  - Assess patient's mobility; develop plan if impaired  - Assess patient's need for assistive devices and provide as appropriate  - Encourage maximum independence but intervene and supervise when necessary  - Involve family in performance of ADLs  - Assess for home care needs following discharge   - Consider OT consult to assist with ADL evaluation and planning for discharge  - Provide patient education as appropriate  Outcome: Progressing       Problem: PAIN - ADULT  Goal: Verbalizes/displays adequate comfort level or baseline comfort level  Description: Interventions:  - Encourage patient to monitor pain and request assistance  - Assess pain using appropriate pain scale  - Administer analgesics based on type and severity of pain and evaluate response  - Implement non-pharmacological measures as appropriate and evaluate response  - Consider cultural and social influences on pain and pain management  - Notify physician/advanced practitioner if interventions unsuccessful or patient reports new pain  Outcome: Progressing     Problem: INFECTION - ADULT  Goal: Absence or prevention of progression during hospitalization  Description: INTERVENTIONS:  - Assess and monitor for signs and symptoms of infection  - Monitor lab/diagnostic results  - Monitor all insertion sites, i.e. indwelling lines, tubes, and drains  - Monitor endotracheal if appropriate and nasal secretions for changes in amount and color  - [place] appropriate cooling/warming therapies per order  - Administer medications as ordered  - Instruct and encourage patient and family to use good hand hygiene technique  - Identify and instruct in appropriate isolation precautions for identified infection/condition  Outcome: Progressing  Goal: Absence of fever/infection during neutropenic period  Description: INTERVENTIONS:  - Monitor WBC    Outcome: Progressing     Problem: SAFETY ADULT  Goal: Patient will remain free of falls  Description: INTERVENTIONS:  - Educate patient/family on patient safety including physical limitations  - Instruct patient to call for assistance with activity   - Consult OT/PT to assist with strengthening/mobility   - Keep Call bell within reach  - Keep bed low and locked with side rails adjusted as appropriate  - Keep care items and personal belongings within reach  - Initiate and maintain comfort rounds  - Make Fall Risk Sign visible to staff  - Offer Toileting every 2 Hours, in advance of need  - Initiate/Maintain bed alarm  - Obtain necessary fall risk management equipment  - Apply yellow socks and bracelet for high fall risk patients  - Consider moving patient to room near nurses station  Outcome: Progressing  Goal: Maintain or return to baseline ADL function  Description: INTERVENTIONS:  -  Assess patient's ability to carry out ADLs; assess patient's baseline for ADL function and identify physical deficits which impact ability to perform ADLs (bathing, care of mouth/teeth, toileting, grooming, dressing, etc.)  - Assess/evaluate cause of self-care deficits   - Assess range of motion  - Assess patient's mobility; develop plan if impaired  - Assess patient's need for assistive devices and provide as appropriate  - Encourage maximum independence but intervene and supervise when necessary  - Involve family in performance of ADLs  - Assess for home care needs following discharge   - Consider OT consult to assist with ADL evaluation and planning for discharge  - Provide patient education as appropriate  Outcome: Progressing  Goal: Maintains/Returns to pre admission functional level  Description: INTERVENTIONS:  - Perform BMAT or MOVE assessment daily.   - Set and communicate daily mobility goal to care team and patient/family/caregiver. - Collaborate with rehabilitation services on mobility goals if consulted  - Perform Range of Motion 2 times a day. - Reposition patient every 2 hours.   - Dangle patient 3 times a day  - Stand patient 3 times a day  - Ambulate patient 3 times a day  - Out of bed to chair 3 times a day   - Out of bed for meals 3 times a day  - Out of bed for toileting  - Record patient progress and toleration of activity level   Outcome: Progressing     Problem: DISCHARGE PLANNING  Goal: Discharge to home or other facility with appropriate resources  Description: INTERVENTIONS:  - Identify barriers to discharge w/patient and caregiver  - Arrange for needed discharge resources and transportation as appropriate  - Identify discharge learning needs (meds, wound care, etc.)  - Arrange for interpretive services to assist at discharge as needed  - Refer to Case Management Department for coordinating discharge planning if the patient needs post-hospital services based on physician/advanced practitioner order or complex needs related to functional status, cognitive ability, or social support system  Outcome: Progressing     Problem: Knowledge Deficit  Goal: Patient/family/caregiver demonstrates understanding of disease process, treatment plan, medications, and discharge instructions  Description: Complete learning assessment and assess knowledge base.   Interventions:  - Provide teaching at level of understanding  - Provide teaching via preferred learning methods  Outcome: Progressing

## 2023-07-24 NOTE — ASSESSMENT & PLAN NOTE
On Losartan, Minipress at home  Held Losartan on admission due to elevated Cr initially  Blood pressure trend noted  · Continue Minipress  · Continue losartan, increased to 100 mg daily  · Amlodipine 2.5 noted  · Monitor blood pressure, verified with manual measurement

## 2023-07-24 NOTE — PLAN OF CARE
Pt presented as poorly responsive and not appropriate for oral food/fluid/medication at the time of the evaluation.      Recommended Diet: NPO   Recommended Form of Meds: non-oral if possible   Other Recommendations: Continue frequent oral care

## 2023-07-24 NOTE — OCCUPATIONAL THERAPY NOTE
Occupational Therapy Evaluation       07/24/23 1120   Note Type   Note type Evaluation   Pain Assessment   Pain Assessment Tool FLACC   Pain Rating: FLACC (Rest) - Face 0   Pain Rating: FLACC (Rest) - Legs 0   Pain Rating: FLACC (Rest) - Activity 0   Pain Rating: FLACC (Rest) - Cry 0   Pain Rating: FLACC (Rest) - Consolability 0   Score: FLACC (Rest) 0   Pain Rating: FLACC (Activity) - Face 1   Pain Rating: FLACC (Activity) - Legs 1   Pain Rating: FLACC (Activity) - Activity 0   Pain Rating: FLACC (Activity) - Cry 0   Pain Rating: FLACC (Activity) - Consolability 1   Score: FLACC (Activity) 3   Home Living   Type of Home Other (Comment);SNF  (LTC)   Home Equipment Wheelchair-manual   Prior Function   Level of [place] Needs assistance with functional mobility; Needs assistance with ADLs   Lives With Facility staff   Receives Help From Family   IADLs Family/Friend/Other provides transportation; Family/Friend/Other provides meals; Family/Friend/Other provides medication management   Comments Patient is a poor historian due to AMS, per chart patient is a LTC resident   General   Additional Pertinent History Patient presented to hospital with AMS, acute encephalopathy, Per chart patient with recent unwitnessed fall while at LT, was found underneath wheelchair   ADL   Eating Assistance 2  Maximal Assistance   Grooming Assistance 3  Moderate Assistance   Grooming Deficit Wash/dry hands; Wash/dry face  (Using washcloth;  Increased cues for command follow,  initiation, and thoroughness of task)   UB Bathing Assistance 1  Total Assistance   LB Bathing Assistance 1  Total Assistance   [address] 1  Total Assistance   LB Dressing Assistance 1  Total Assistance   Toileting Assistance  1  Total Assistance   Toileting Deficit   (Urinary catheter)   Bed Mobility   Rolling R 2  Maximal assistance   Additional items Assist x 2   Rolling L 2  Maximal assistance   Additional items Assist x 2   Supine to Sit 2  Maximal assistance   Additional items Assist x 2   Sit to Supine 2  Maximal assistance   Additional items Assist x 2   Additional Comments Max assist x 2 for all rolling and supine to/from sit transfers; Static sitting EOB for approx 10 minutes; patient with significant lateral lean to right, unable to correct despite max verbal, tactile, and visual cueing   Transfers   Additional Comments unable to assess due to poor sitting balance   Balance   Static Sitting Poor -   Dynamic Sitting Poor -   Activity Tolerance   Activity Tolerance Patient limited by fatigue; Other (Comment)  (Limited by weakness, cognition, poor coordination)   RUE Assessment   RUE Assessment WFL   LUE Assessment   LUE Assessment WFL   Hand Function   Gross Motor Coordination Impaired   Hand Function Comments RUE and RLE gross motor coordination   Cognition   Overall Cognitive Status Impaired   Arousal/Participation Lethargic  (Increased alertness with increased activity)   Attention Difficulty attending to directions   Orientation Level Oriented to person;Disoriented to place; Disoriented to time;Disoriented to situation   Following Commands Follows one step commands inconsistently   Comments Patient initially very lethargic, increased alertness with increased verbal and tactile stimulation and increased activity; Patient oriented to self only; per chart patient is primarily Chris speaking, Patient confused throughout session, inconsistent with one step command follow   Assessment   Limitation Decreased ADL status; Decreased UE strength;Decreased Safe judgement during ADL;Decreased cognition;Decreased endurance;Decreased fine motor control;Decreased self-care trans;Decreased high-level ADLs   Prognosis Good   Assessment Patient evaluated by Occupational Therapy. Patient admitted with Acute encephalopathy.   The patients occupational profile, medical and therapy history includes a extensive additional review of physical, cognitive, or psychosocial history related to current functional performance. Comorbidities affecting functional mobility and ADLS include: HLD, depressive disorder, anxiety, HTN, atherosclerotic vascular disease. Prior to admission, patient was requiring assist for functional mobility with wheelchair, requiring assist for ADLS, requiring assist for IADLS and a resident of long term care. The evaluation identifies the following performance deficits: weakness, impaired balance, decreased endurance, increased fall risk, new onset of impairment of functional mobility, decreased ADLS, decreased IADLS, decreased activity tolerance, decreased safety awareness, impaired judgement, decreased cognition, decreased strength and impaired psychosocial skills, that result in activity limitations and/or participation restrictions. This evaluation requires clinical decision making of high complexity, because the patient presents with comorbidites that affect occupational performance and required significant modification of tasks or assistance with consideration of multiple treatment options. The Barthel Index was used as a functional outcome tool presenting with a score of Barthel Index Score: 5, indicating marked limitations of functional mobility and ADLS. The patient's raw score on the -PAC Daily Activity Inpatient Short Form is 8. A raw score of less than 19 suggests the patient may benefit from discharge to post-acute rehabilitation services. Please refer to the recommendation of the Occupational Therapist for safe discharge planning. Patient will benefit from skilled Occupational Therapy services to address above deficits and facilitate a safe return to prior level of function. Goals   Patient Goals Unable to state due to impaired cognition   STG Time Frame   (1-7 days)   Short Term Goal  Goals established to promote Patient Goals: Unable to state due to impaired cognition:  Eating: mod assist; Grooming: min assist seated;  Bathing: max assist; Upper Body Dressing max assist; Lower Body Dressing: max assist; Toileting: max assist; Patient will increase sit pivot commode transfer to max assist of 2 with rolling walker to increase performance and safety with ADLS and functional mobility; Patient will increase sitting tolerance to 5 minutes during ADL task to decrease assistance level and decrease fall risk; Patient will increase bed mobility to mod assist of 2 in preparation for ADLS and transfers; Patient will tolerate 5 minutes of UE ROM/strengthening to increase general activity tolerance and performance in ADLS/IADLS; Patient will improve functional activity tolerance to 5 minutes of sustained functional tasks to increase participation in basic self-care and decrease assistance level;  Patient will be able to to verbalize understanding and perform energy conservation/proper body mechanics during ADLS and functional mobility at least 25% of the time with maximal cueing to decrease signs of fatigue and increase stamina to return to prior level of function; Patient will increase static/dynamic sitting balance to poor to improve the ability to sit at edge of bed or on a chair for ADLS;  Patient will increase static/dynamic standing balance to poor to improve postural stability and decrease fall risk during standing ADLS and transfers. LTG Time Frame   (8-14 days)   Long Term Goal Eating: min assist; Grooming: supervision seated; Bathing: mod assist; Upper Body Dressing mod assist; Lower Body Dressing: mod assist; Toileting: mod assist; Patient will increase stand pivot commode transfer to mod assist of 2 with rolling walker to increase performance and safety with ADLS and functional mobility; Patient will increase sitting tolerance to 10 minutes during ADL task to decrease assistance level and decrease fall risk; Patient will increase bed mobility to max assist in preparation for ADLS and transfers;  Patient will tolerate 10 minutes of UE ROM/strengthening to increase general activity tolerance and performance in ADLS/IADLS; Patient will improve functional activity tolerance to 10 minutes of sustained functional tasks to increase participation in basic self-care and decrease assistance level;  Patient will be able to to verbalize understanding and perform energy conservation/proper body mechanics during ADLS and functional mobility at least 50% of the time with moderate cueing to decrease signs of fatigue and increase stamina to return to prior level of function; Patient will increase static/dynamic sitting balance to poor+ to improve the ability to sit at edge of bed or on a chair for ADLS;  Patient will increase static/dynamic standing balance to poor+ to improve postural stability and decrease fall risk during standing ADLS and transfers. Pt will score >/= 12/24 on AM-PAC Daily Activity Inpatient scale to promote safe independence with ADLs and functional mobility; Pt will score >/= 35/100 on Barthel Index in order to decrease caregiver assistance needed and increase ability to perform ADLs and functional mobility. Plan   Treatment Interventions ADL retraining;Functional transfer training;UE strengthening/ROM; Endurance training;Patient/family training;Equipment evaluation/education; Compensatory technique education;Continued evaluation; Energy conservation; Activityengagement   Goal Expiration Date 08/07/23   OT Frequency 3-5x/wk   Recommendation   OT Discharge Recommendation Post acute rehabilitation services   AM-[facility] Daily Activity Inpatient   Lower Body Dressing 1   Bathing 1   Toileting 1   Upper Body Dressing 1   Grooming 2   Eating 2   Daily Activity Raw Score 8   Turning Head Towards Sound 2   Follow Simple Instructions 2   Low Function Daily Activity Raw Score 12   Low Function Daily Activity Standardized Score  21.38   AM-PAC Applied Cognition Inpatient   Following a Speech/Presentation 1   Understanding Ordinary Conversation 2   Taking Medications 1 Remembering Where Things Are Placed or Put Away 2   Remembering List of 4-5 Errands 1   Taking Care of Complicated Tasks 1   Applied Cognition Raw Score 8   Applied Cognition Standardized Score 19.32   Barthel Index   Feeding 0   Bathing 0   Grooming Score 0   Dressing Score 0   Bladder Score 0   Bowels Score 0   Toilet Use Score 0   Transfers (Bed/Chair) Score 5   Mobility (Level Surface) Score 0   Stairs Score 0   Barthel Index Score 5   Licensure   [place] License Number  Jeffryisidro Alcocerkins, OTR/L [id number]

## 2023-07-24 NOTE — ASSESSMENT & PLAN NOTE
· ? Aspiration on CT  · Was prescribed Avelox 400mg po daily for 6 days for infection,took 3 doses so far. Staff not sure for what infection. Will hold.   · IV rocephin as above  · Procal pending  · ST eval

## 2023-07-24 NOTE — SPEECH THERAPY NOTE
Speech-Language Pathology Bedside Swallow Evaluation      Patient Name: Eugenie Holter    GBZKL'A Date: 7/24/2023     Problem List  Principal Problem:    Acute encephalopathy  Active Problems:    Hypertension    Hyperlipidemia    Depression with anxiety    CAD (coronary artery disease)    Elevated serum creatinine    Elevated liver enzymes    Abnormal CT scan    Compression fracture of vertebral column [facility])      Past Medical History  Past Medical History:   Diagnosis Date   • Anxiety    • Atherosclerotic coronary vascular disease    • Depressive disorder    • Hyperlipidemia    • Hypertension        Past Surgical History  History reviewed. No pertinent surgical history. Summary   Pt presented as poorly responsive and not appropriate for oral food/fluid/medication at the time of the evaluation. Recommended Diet: NPO   Recommended Form of Meds: non-oral if possible   Other Recommendations: Continue frequent oral care        Current Medical Status  Carter Sanchez ALAN [facility]) Tawnya Candelaria is a 80 y.o. male with PMH of HTN,HLD,Depression, anxiety,CAD who presents with  lethargy starting on Sunday afternoon per NH staff. Pt was found under his wheel chair on Saturday night,unwitnessed. Patient was started on Flexeril 5mg po TID prn on Saturday after the fall,taken 6 doses total so far. DX:  Acute encephalopathy, abnormal CT scan of chest (see below, r/o pneumonia), elevated serum creatinine, elevated liver enzymes, T11-L1 compression fxs (age indeterminate), depression/anxiety. Pt NPO pending SLP Swallowing Evaluation. [id number] [facility] [place]. Current Precautions:  Fall      Allergies:  No known food allergies    Past medical history:  Please see H&P for details    Special Studies:  7/23 CT of chest:   1. No evidence of acute visceral/vascular injury in the thorax, abdomen, or pelvis.   2.  Bibasilar dependent atelectasis and scattered lower lobe predominant hazy pulmonary infiltrates noted which could be related to aspiration. Attention on follow-up examination recommended. 3.  Trace right pleural effusion. 4.  Calcific atherosclerosis. 5.  Cholelithiasis without evidence for acute cholecystitis or significant biliary ductal dilatation. 6.  Fat-containing left lateral lower abdominal wall hernia. 7.  Age-indeterminate but more likely to be chronic compression fracture deformities of the T11 and L1 vertebrae as described above. Recommend clinical correlation with physical exam findings for point tenderness in these areas to exclude superimposed   acute injury. 8.  Other ancillary findings (see full report)    CT of C spine: Multilevel degenerative changes without acute cervical spine fracture or traumatic malalignment     Social/Education/Vocational Hx:  Pt only very recently admitted to [address] [place] [address]   Current Risks for Dysphagia & Aspiration: AMS  Current Diet: NPO   Baseline Diet: regular diet and thin liquids      Baseline Assessment   Behavior/Cognition: low alertness level  Speech/Language Status: not able to to follow commands and no verbal output noted  Patient Positioning: upright in bed  Pain Status/Interventions/Response to Interventions:  No report of or nonverbal indications of pain. Swallow Mechanism Exam  Dentition: edentulous and upper dentures  Vocal quality:No voicing elciited or spontaneously produced   Oral motor strength and ROM: pt not able to participate  Respiratory Status: on 3L O2  With O2 sat of 97%    Consistencies Assessed and Performance   Consistencies Administered: ice chips    Oral Stage: IMPAIRED  Weak retrieval from spoon. LIMITED mastication  No bolus transfer noted. Risk for reduced oral control.     Pharyngeal Stage: Unable to assess    Strategies and Efficacy: not responsive to verbal or tactile cues    Summary and Recommendations (see above)    Results Reviewed with: RN and family     Treatment Recommended: yes Dysphagia LTG  -Patient will demonstrate safe and effective oral intake (without overt s/s significant oral/pharyngeal dysphagia including s/s penetration or aspiration) for the highest appropriate diet level. Short Term Goals:   To follow additional evaluation    Aleksey Lema [id number] [facility] [phone]

## 2023-07-24 NOTE — H&P
[address]   H&P  Name: Franca Whiting 80 y.o. male I MRN: [id number]  Unit/Bed#: [address] [place] [address] Date of Admission: 7/23/2023   Date of Service: 7/24/2023 I Hospital Day: 0      Assessment/Plan   * Acute encephalopathy  Assessment & Plan  Patient presents with lethargy started on Sunday afternoon from NH per staff. Pt was found under his wheel chair on Saturday night,unwitnessed. Patient was started on Flexeril 5mg po TID prn on Saturday after the fall,taken 6 doses total so far. · CTH,C spine no acute findings  · CT C/A/P showed -  No acute traumatic injury. Bibasilar dependent atelectasis and scattered lower lobe predominant hazy pulmonary infiltrates noted which could be related to aspiration. Trace right pleural effusion. · UA clean  · Ammonia normal  · ABG shows mild hypoxia  · WBC normal,no bands. Afebrile. · On Oxygen 3L currently. Does not use O2 at baseline. Satting low 90s  · Suspect 2/2 to flexeril, unlikely due to ? Aspiration pneumonia  · Hold Flexeril  · Consider MRI of brain to r/o central  Etiology if family agreeable  · Neuro checks  · Start rocephin for ? Aspiration pneumonia  · NPO till awake,alert  · Gentle IVF  · PT OT ST    Abnormal CT scan  Assessment & Plan  · ? Aspiration on CT  · Was prescribed Avelox 400mg po daily for 6 days for infection,took 3 doses so far. Staff not sure for what infection. Will hold. · IV rocephin as above  · Procal pending  · ST eval    Elevated serum creatinine  Assessment & Plan  · Cr 1.51  · Unclear baseline  · Gentle IVF  · BMP in AM    Elevated liver enzymes  Assessment & Plan  · AST 54  · Mild  · Trend LFT    Compression fracture of vertebral column (HCC)  Assessment & Plan  · CT showed - Age-indeterminate but more likely to be chronic compression fracture deformities of the T11 and L1 vertebrae.   · Monitor for back pain    CAD (coronary artery disease)  Assessment & Plan  · Continue Lipitor    Depression with anxiety  Assessment & Plan  · Continue zoloft    Hyperlipidemia  Assessment & Plan  · Continue statin    Hypertension  Assessment & Plan  · On Losartan,Minipress. · Hold Losartan due to elevated Cr  · Continue Minipress  · Hydralazine prn  · BP labile            VTE Prophylaxis: Enoxaparin (Lovenox)  / sequential compression device   Code Status: Full code  POLST: POLST form is not discussed and not completed at this time. Anticipated Length of Stay:  Patient will be admitted on an Inpatient basis with an anticipated length of stay of  > 2 midnights. Justification for Hospital Stay: [facility]    Total Time for Visit, including Counseling / Coordination of Care: 45 minutes. Greater than 50% of this total time spent on direct patient counseling and coordination of care. Chief Complaint:   Lethargy started in the afternoon    History of Present Illness:    Zuleyka Rodriges is a 80 y.o. male with PMH of HTN,HLD,Depression, anxiety,CAD who presents with  lethargy started on Sunday afternoon from NH per staff. Pt was found under his wheel chair on Saturday night,unwitnessed. Patient was started on Flexeril 5mg po TID prn on Saturday after the fall,taken 6 doses total so far. Patient lethargic on exam, mumbles to touch. Spoke to staff at NH,reported above. Patient does not use Oxygen at baseline. Patient confused at [address] only. Patient is new to NH. Review of Systems:    Review of Systems   Unable to perform ROS: Mental status change (spoke to staff at [facility])   Constitutional:        Found under wheelchair on Saturday night   Psychiatric/Behavioral:        Lethargic   All other systems reviewed and are negative. Past Medical and Surgical History:     Past Medical History:   Diagnosis Date   • Anxiety    • Atherosclerotic coronary vascular disease    • Depressive disorder    • Hyperlipidemia    • Hypertension        History reviewed.  No pertinent surgical history. Meds/Allergies:    Prior to Admission medications    Medication Sig Start Date End Date Taking? Authorizing Provider   atorvastatin (LIPITOR) 10 mg tablet Take 5 mg by mouth daily   Yes Historical Provider, MD   cyclobenzaprine (FLEXERIL) 5 mg tablet Take 5 mg by mouth 3 (three) times a day as needed for muscle spasms   Yes Historical Provider, MD   lidocaine (LIDODERM) 5 % Apply 1 patch topically daily Remove & Discard patch within 12 hours or as directed by MD   Yes Historical Provider, MD   losartan (COZAAR) 50 mg tablet Take 75 mg by mouth daily   Yes Historical Provider, MD   moxifloxacin (AVELOX) 400 MG tablet Take 400 mg by mouth daily   Yes Historical Provider, MD   oxybutynin (DITROPAN) 5 mg tablet Take 5 mg by mouth 3 (three) times a day   Yes Historical Provider, MD   polyethylene glycol (MIRALAX) 17 g packet Take 17 g by mouth daily   Yes Historical Provider, MD   prazosin (MINIPRESS) 1 mg capsule Take 1 mg by mouth daily at bedtime   Yes Historical Provider, MD   sertraline (ZOLOFT) 50 mg tablet Take 50 mg by mouth daily   Yes Historical Provider, MD   magnesium hydroxide (MILK OF MAGNESIA) 400 mg/5 mL oral suspension Take by mouth daily as needed for constipation    Historical Provider, MD     I have reveiwed home medications using records provided by [facility].     Allergies: No Known Allergies    Social History:     Marital Status: /Civil Union   Occupation: retired  Patient Pre-hospital Living Situation: NH  Patient Pre-hospital Level of Mobility:   Patient Pre-hospital Diet Restrictions: regular consistency  Substance Use History:   Social History     Substance and Sexual Activity   Alcohol Use Not Currently     Social History     Tobacco Use   Smoking Status Never   Smokeless Tobacco Never     Social History     Substance and Sexual Activity   Drug Use Never       Family History:    non-contributory    Physical Exam:     Vitals:   Blood Pressure: 117/84 (07/24/23 0202)  Pulse: 74 (07/24/23 0202)  Temperature: 98.6 °F (37 °C) (07/24/23 0202)  Temp Source: Tympanic (07/23/23 2009)  Respirations: 20 (07/24/23 0202)  SpO2: 93 % (07/24/23 0202)    Physical Exam  Vitals and nursing note reviewed. Constitutional:       Appearance: He is well-developed. HENT:      Head: Normocephalic and atraumatic. Neck:      Thyroid: No thyromegaly. Vascular: No JVD. Trachea: No tracheal deviation. Cardiovascular:      Rate and Rhythm: Normal rate and regular rhythm. Heart sounds: Normal heart sounds. Pulmonary:      Effort: Pulmonary effort is normal. No respiratory distress. Breath sounds: No wheezing or rales. Comments: Upper lobes BS unremarkable. On O2 3.5 L,satting mid 90s   Abdominal:      General: Bowel sounds are normal. There is no distension. Palpations: Abdomen is soft. Tenderness: There is no abdominal tenderness. There is no guarding. Musculoskeletal:         General: No swelling. Cervical back: Neck supple. Right lower leg: No edema. Left lower leg: No edema. Skin:     General: Skin is warm and dry. Neurological:      Mental Status: He is alert and oriented to person, place, and time. Comments: Lethargic, arousable to touch,mumbles,does not follow commands. Psychiatric:         Judgment: Judgment normal.         Additional Data:     Lab Results: I have personally reviewed pertinent reports. Results from last 7 days   Lab Units 07/23/23 2053   WBC Thousand/uL 9.79   HEMOGLOBIN g/dL 11.8*   HEMATOCRIT % 36.6   PLATELETS Thousands/uL 265   NEUTROS PCT % 58   LYMPHS PCT % 24   MONOS PCT % 12   EOS PCT % 4     Results from last 7 days   Lab Units 07/23/23 2053   POTASSIUM mmol/L 4.9   CHLORIDE mmol/L 106   CO2 mmol/L 25   BUN mg/dL 28*   CREATININE mg/dL 1.51*   CALCIUM mg/dL 8.7   ALK PHOS U/L 50   ALT U/L 38   AST U/L 54*           Imaging: I have personally reviewed pertinent reports.       CT chest abdomen pelvis w contrast    Result Date: 7/24/2023  Narrative: CT CHEST, ABDOMEN AND PELVIS WITH IV CONTRAST INDICATION:   trauma. COMPARISON:  None. TECHNIQUE: CT examination of the chest, abdomen and pelvis was performed. Multiplanar 2D reformatted images were created from the source data. This examination, like all CT scans performed in the [facility], was performed utilizing techniques to minimize radiation dose exposure, including the use of iterative reconstruction and automated exposure control. Radiation dose length product (DLP) for this visit:  984 mGy-cm IV Contrast:  100 mL of iohexol (OMNIPAQUE) Enteric Contrast: Enteric contrast was administered. FINDINGS: CHEST LUNGS: Central airways are patent. There is no tracheal or endobronchial lesion. Bibasilar dependent atelectasis and scattered lower lobe predominant hazy pulmonary infiltrates noted which could be related to aspiration. Attention on follow-up examination recommended. No suspicious pulmonary parenchymal mass identified in the aerated portions of the lung parenchyma. PLEURA: No pneumothorax. Trace right pleural effusion. HEART/GREAT VESSELS: Heart is unremarkable for patient's age. No pericardial effusion. No thoracic aortic aneurysm no dissection. No evidence of acute vascular injury. Visualized proximal thoracic great vessels are patent with normal course and caliber. Moderate scattered calcific atherosclerosis noted. MEDIASTINUM AND WAGNER: No mediastinal mass/hematoma or mediastinal/hilar lymphadenopathy by size criteria. Visualized thyroid glands appear grossly unremarkable. Esophagus is normal in course and caliber. No significant prevertebral soft tissue swelling or hematoma/mass. CHEST WALL AND LOWER NECK:  Unremarkable. ABDOMEN LIVER/BILIARY TREE:  Unremarkable. GALLBLADDER: Large calcified gallstone within the gallbladder, without abnormal wall thickening or pericholecystic inflammatory changes. SPLEEN:  Unremarkable.  PANCREAS: Unremarkable. ADRENAL GLANDS:  Unremarkable. KIDNEYS/URETERS: Kidneys are normal in size and position. No evidence of acute injury. Small cysts and too small to characterize hypodensities noted in the midpole of the right kidney and upper pole of the left kidney. No suspicious solid renal mass, nephrolithiasis, hydronephrosis, or perinephric fluid collection/inflammatory stranding bilaterally. STOMACH AND BOWEL: Stomach is contracted limiting evaluation. The small and large bowel loops appear normal in course and caliber without obstruction or inflammation noted. Terminal ileum appear grossly unremarkable. Appendix is not definitively identified but no significant pericecal inflammatory changes. Sigmoid diverticulosis without evidence of acute diverticulitis. APPENDIX:  No findings to suggest appendicitis. ABDOMINOPELVIC CAVITY:  No ascites or loculated fluid collection. Nonspecific dense calcification along the right lower abdominal mesentery noted of uncertain etiology. Mala Donning No pneumoperitoneum. No lymphadenopathy. VESSELS:  Atherosclerotic changes are present. No evidence of aneurysm. PELVIS REPRODUCTIVE ORGANS:  Unremarkable for patient's age. URINARY BLADDER:  Unremarkable. ABDOMINAL WALL/INGUINAL REGIONS: Fat-containing left lateral lower abdominal wall hernia. No inguinal hernias noted. No subcutaneous mass/hematoma or fluid collections. . OSSEOUS STRUCTURES: Age-indeterminate but favored to represent chronic compression deformity of the L1 vertebral body with greater than 50% loss of vertebral body height is seen. No significant posterior bony retropulsion or involvement of the posterior spinal elements noted. Age-indeterminate but likely chronic mild superior endplate B80 compression deformity with minimal loss of vertebral body height is also seen without posterior bony retropulsion or posterior spinal element involvement. There is otherwise no acute osseous fracture or traumatic malalignment.  Multilevel degenerative changes of the thoracolumbar spine and bilateral hip joints. No destructive bone lesions identified. Mild S-shaped thoracolumbar scoliosis. Impression: 1. No evidence of acute visceral/vascular injury in the thorax, abdomen, or pelvis. 2.  Bibasilar dependent atelectasis and scattered lower lobe predominant hazy pulmonary infiltrates noted which could be related to aspiration. Attention on follow-up examination recommended. 3.  Trace right pleural effusion. 4.  Calcific atherosclerosis. 5.  Cholelithiasis without evidence for acute cholecystitis or significant biliary ductal dilatation. 6.  Fat-containing left lateral lower abdominal wall hernia. 7.  Age-indeterminate but more likely to be chronic compression fracture deformities of the T11 and L1 vertebrae as described above. Recommend clinical correlation with physical exam findings for point tenderness in these areas to exclude superimposed acute injury. 8.  Other ancillary findings detailed above. Workstation performed: [id number]     CT cervical spine without contrast    Result Date: 7/23/2023  Narrative: CT CERVICAL SPINE - WITHOUT CONTRAST INDICATION:   Neck trauma (Age >= 65y) trauma. COMPARISON:  None. TECHNIQUE:  CT examination of the cervical spine was performed without intravenous contrast.  Contiguous axial images were obtained. Multiplanar 2D reformatted images were created from the source data. Radiation dose length product (DLP) for this visit:  593 mGy-cm . This examination, like all CT scans performed in the [facility], was performed utilizing techniques to minimize radiation dose exposure, including the use of iterative reconstruction and automated exposure control. IMAGE QUALITY:  Diagnostic. FINDINGS: ALIGNMENT:  Normal alignment of the cervical spine. No subluxation. VERTEBRAE:  No acute cervical spine fracture. Vertebral body heights are preserved.  DEGENERATIVE CHANGES:  Moderate multilevel cervical degenerative changes are noted. No critical central canal stenosis. PREVERTEBRAL AND PARASPINAL SOFT TISSUES: Unremarkable THORACIC INLET:  Normal.     Impression: Multilevel degenerative changes without acute cervical spine fracture or traumatic malalignment. Workstation performed: [id number]     CT head without contrast    Result Date: 7/23/2023  Narrative: CT BRAIN - WITHOUT CONTRAST INDICATION:   trauma/ms changes. COMPARISON:  None. TECHNIQUE:  CT examination of the brain was performed. Multiplanar 2D reformatted images were created from the source data. Radiation dose length product (DLP) for this visit:  993 mGy-cm . This examination, like all CT scans performed in the [facility], was performed utilizing techniques to minimize radiation dose exposure, including the use of iterative reconstruction and automated exposure control. IMAGE QUALITY:  Diagnostic. FINDINGS: PARENCHYMA: Decreased attenuation is noted in periventricular and subcortical white matter demonstrating an appearance that is statistically most likely to represent moderate microangiopathic change. Hypodense areas in the bilateral basal ganglia and right caudate likely represent sequelae of lacunar infarcts. No CT signs of acute large vascular territory transcortical infarction. If clinical concern for acute ischemia, recommend more sensitive MRI brain for better evaluation. No intracranial mass, mass effect or midline shift. No acute parenchymal hemorrhage. VENTRICLES AND EXTRA-AXIAL SPACES: Severe central greater than cortical volume loss/atrophy. Basilar cisterns appear patent and unremarkable. VISUALIZED ORBITS: Normal visualized orbits. PARANASAL SINUSES: Small mucous retention cyst in the left maxillary sinus. The remaining paranasal sinuses and mastoid air cells appear well aerated and clear without air-fluid levels. Conchetta Savory CALVARIUM AND EXTRACRANIAL SOFT TISSUES: Bony calvarium and temporomandibular joints are intact. Conchetta Savory Impression: No acute intracranial hemorrhage or depressed calvarial fracture. Senescent changes as described above. Workstation performed: [id number]       EKG, Pathology, and Other Studies Reviewed on Admission:   · EKG: NSR    [employer] Records Reviewed: Yes     ** Please Note: Dragon 360 Dictation voice to text software may have been used in the creation of this document.  **

## 2023-07-24 NOTE — ASSESSMENT & PLAN NOTE
CT:  Bibasilar dependent atelectasis and scattered lower lobe predominant hazy pulmonary infiltrates noted which could be related to aspiration  · Was prescribed Avelox 400mg po daily for 6 days for infection ,took 3 doses so far at the time of admission. Staff not sure for what infection.   Held on admission  · Initially started on ceftriaxone, subsequently discontinued  · Procal-WNL  · ST eval --continue with level 3 dysphagia diet  Aspiration precaution  Monitor clinically  Follow-up imaging as outpatient

## 2023-07-24 NOTE — ASSESSMENT & PLAN NOTE
· On Losartan,Minipress.    · Hold Losartan due to elevated Cr  · Continue Minipress  · Hydralazine prn  · BP labile

## 2023-07-25 LAB
25(OH)D3 SERPL-MCNC: 37.5 NG/ML (ref 30–100)
ANION GAP SERPL CALCULATED.3IONS-SCNC: 7 MMOL/L
ATRIAL RATE: 71 BPM
BASOPHILS # BLD AUTO: 0.13 THOUSANDS/ÂΜL (ref 0–0.1)
BASOPHILS NFR BLD AUTO: 1 % (ref 0–1)
BUN SERPL-MCNC: 21 MG/DL (ref 5–25)
CALCIUM SERPL-MCNC: 8.7 MG/DL (ref 8.4–10.2)
CHLORIDE SERPL-SCNC: 104 MMOL/L (ref 96–108)
CO2 SERPL-SCNC: 25 MMOL/L (ref 21–32)
CREAT SERPL-MCNC: 1.11 MG/DL (ref 0.6–1.3)
EOSINOPHIL # BLD AUTO: 0.5 THOUSAND/ÂΜL (ref 0–0.61)
EOSINOPHIL NFR BLD AUTO: 6 % (ref 0–6)
ERYTHROCYTE [DISTWIDTH] IN BLOOD BY AUTOMATED COUNT: 14 % (ref 11.6–15.1)
FOLATE SERPL-MCNC: >22.3 NG/ML
GFR SERPL CREATININE-BSD FRML MDRD: 56 ML/MIN/1.73SQ M
GLUCOSE SERPL-MCNC: 101 MG/DL (ref 65–140)
HCT VFR BLD AUTO: 39 % (ref 36.5–49.3)
HGB BLD-MCNC: 12.5 G/DL (ref 12–17)
IMM GRANULOCYTES # BLD AUTO: 0.07 THOUSAND/UL (ref 0–0.2)
IMM GRANULOCYTES NFR BLD AUTO: 1 % (ref 0–2)
LYMPHOCYTES # BLD AUTO: 2.14 THOUSANDS/ÂΜL (ref 0.6–4.47)
LYMPHOCYTES NFR BLD AUTO: 24 % (ref 14–44)
MCH RBC QN AUTO: 32 PG (ref 26.8–34.3)
MCHC RBC AUTO-ENTMCNC: 32.1 G/DL (ref 31.4–37.4)
MCV RBC AUTO: 100 FL (ref 82–98)
MONOCYTES # BLD AUTO: 0.97 THOUSAND/ÂΜL (ref 0.17–1.22)
MONOCYTES NFR BLD AUTO: 11 % (ref 4–12)
NEUTROPHILS # BLD AUTO: 5.29 THOUSANDS/ÂΜL (ref 1.85–7.62)
NEUTS SEG NFR BLD AUTO: 57 % (ref 43–75)
NRBC BLD AUTO-RTO: 0 /100 WBCS
P AXIS: 25 DEGREES
PLATELET # BLD AUTO: 259 THOUSANDS/UL (ref 149–390)
PMV BLD AUTO: 11.7 FL (ref 8.9–12.7)
POTASSIUM SERPL-SCNC: 4.6 MMOL/L (ref 3.5–5.3)
PR INTERVAL: 270 MS
QRS AXIS: -40 DEGREES
QRSD INTERVAL: 102 MS
QT INTERVAL: 390 MS
QTC INTERVAL: 423 MS
RBC # BLD AUTO: 3.91 MILLION/UL (ref 3.88–5.62)
SODIUM SERPL-SCNC: 136 MMOL/L (ref 135–147)
T WAVE AXIS: -22 DEGREES
TSH SERPL DL<=0.05 MIU/L-ACNC: 3.44 UIU/ML (ref 0.45–4.5)
VENTRICULAR RATE: 71 BPM
VIT B12 SERPL-MCNC: 829 PG/ML (ref 180–914)
WBC # BLD AUTO: 9.1 THOUSAND/UL (ref 4.31–10.16)

## 2023-07-25 PROCEDURE — 80048 BASIC METABOLIC PNL TOTAL CA: CPT | Performed by: STUDENT IN AN ORGANIZED HEALTH CARE EDUCATION/TRAINING PROGRAM

## 2023-07-25 PROCEDURE — 99223 1ST HOSP IP/OBS HIGH 75: CPT | Performed by: PSYCHIATRY & NEUROLOGY

## 2023-07-25 PROCEDURE — 93010 ELECTROCARDIOGRAM REPORT: CPT | Performed by: INTERNAL MEDICINE

## 2023-07-25 PROCEDURE — 82607 VITAMIN B-12: CPT | Performed by: STUDENT IN AN ORGANIZED HEALTH CARE EDUCATION/TRAINING PROGRAM

## 2023-07-25 PROCEDURE — 85025 COMPLETE CBC W/AUTO DIFF WBC: CPT | Performed by: STUDENT IN AN ORGANIZED HEALTH CARE EDUCATION/TRAINING PROGRAM

## 2023-07-25 PROCEDURE — 99232 SBSQ HOSP IP/OBS MODERATE 35: CPT | Performed by: INTERNAL MEDICINE

## 2023-07-25 PROCEDURE — 92526 ORAL FUNCTION THERAPY: CPT

## 2023-07-25 PROCEDURE — 84443 ASSAY THYROID STIM HORMONE: CPT | Performed by: STUDENT IN AN ORGANIZED HEALTH CARE EDUCATION/TRAINING PROGRAM

## 2023-07-25 PROCEDURE — 82306 VITAMIN D 25 HYDROXY: CPT | Performed by: STUDENT IN AN ORGANIZED HEALTH CARE EDUCATION/TRAINING PROGRAM

## 2023-07-25 PROCEDURE — 93005 ELECTROCARDIOGRAM TRACING: CPT

## 2023-07-25 PROCEDURE — 82746 ASSAY OF FOLIC ACID SERUM: CPT | Performed by: PHYSICIAN ASSISTANT

## 2023-07-25 RX ORDER — ACETAMINOPHEN 325 MG/1
325 TABLET ORAL EVERY 6 HOURS PRN
Status: DISCONTINUED | OUTPATIENT
Start: 2023-07-25 | End: 2023-07-31 | Stop reason: HOSPADM

## 2023-07-25 RX ORDER — FAMOTIDINE 10 MG/ML
20 INJECTION, SOLUTION INTRAVENOUS
Status: DISCONTINUED | OUTPATIENT
Start: 2023-07-25 | End: 2023-07-26

## 2023-07-25 RX ORDER — ACETAMINOPHEN 325 MG/1
650 TABLET ORAL EVERY 6 HOURS SCHEDULED
Status: DISCONTINUED | OUTPATIENT
Start: 2023-07-25 | End: 2023-07-31 | Stop reason: HOSPADM

## 2023-07-25 RX ADMIN — OXYBUTYNIN CHLORIDE 5 MG: 5 TABLET ORAL at 16:48

## 2023-07-25 RX ADMIN — ACETAMINOPHEN 650 MG: 325 TABLET ORAL at 13:52

## 2023-07-25 RX ADMIN — OXYBUTYNIN CHLORIDE 5 MG: 5 TABLET ORAL at 21:59

## 2023-07-25 RX ADMIN — ACETAMINOPHEN 650 MG: 325 TABLET ORAL at 23:18

## 2023-07-25 RX ADMIN — ACETAMINOPHEN 650 MG: 325 TABLET ORAL at 18:10

## 2023-07-25 RX ADMIN — PRAZOSIN HYDROCHLORIDE 1 MG: 1 CAPSULE ORAL at 22:02

## 2023-07-25 RX ADMIN — ENOXAPARIN SODIUM 30 MG: 30 INJECTION SUBCUTANEOUS at 08:27

## 2023-07-25 RX ADMIN — CEFTRIAXONE 1000 MG: 1 INJECTION, SOLUTION INTRAVENOUS at 02:52

## 2023-07-25 RX ADMIN — FAMOTIDINE 20 MG: 10 INJECTION, SOLUTION INTRAVENOUS at 08:38

## 2023-07-25 RX ADMIN — ATORVASTATIN CALCIUM 5 MG: 10 TABLET, FILM COATED ORAL at 08:27

## 2023-07-25 NOTE — PROGRESS NOTES
[facility] Internal Medicine Progress Note  Patient: Sue Solis 80 y.o. male   MRN: [id number]  PCP: No primary care provider on file. Unit/Bed#: [address] [place] [address] Encounter: [id number]  Date Of Visit: 07/25/23    Problem List:    Principal Problem:    Acute encephalopathy  Active Problems:    Hypertension    CAD (coronary artery disease)    Elevated liver enzymes    Abnormal CT scan    Compression fracture of vertebral column (HCC)    Hyperlipidemia    Depression with anxiety      Assessment & Plan:    * Acute encephalopathy  Assessment & Plan  Patient presents with lethargy started on Sunday afternoon from NH per staff. Pt was found under his wheel chair on Saturday night,unwitnessed. Patient was started on Flexeril 5mg po TID prn on Saturday after the fall,taken 6 doses total so far. · Toxic Encephalopathy likely due to newly started Flexeril, evidenced by lethargy, treated with CT head, frequent neuro checks, IVF, UA and lab monitoring. · CTH, C spine no acute findings  · CT C/A/P showed -  No acute traumatic injury. Bibasilar dependent atelectasis and scattered lower lobe predominant hazy pulmonary infiltrates noted which could be related to aspiration. Trace right pleural effusion. · UA clean  · Ammonia normal  · ABG shows mild hypoxia  · WBC normal,no bands. Afebrile. · Was on oxygen 3L. Does not use O2 at baseline. Satting mid 90s  · Unable to obtain levels of Flexeril  · MRI of brain showed no signs of ischemia  Mental status improving. · Continue Rocephin for possible aspiration pneumonia pending clinical course. Follow-up repeat procalcitonin  · Speech pathology recommends stage III dysphagia diet  · PT OT  · Neurology consulted, no additional work-up recommended at this time. Agree altered mentation likely due to Flexeril. · Avoid sedating medications.   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 · Tylenol, lidocaine patch. · Monitor for back pain    Abnormal CT scan  Assessment & Plan  · CT:  Bibasilar dependent atelectasis and scattered lower lobe predominant hazy pulmonary infiltrates noted which could be related to aspiration  · Was prescribed Avelox 400mg po daily for 6 days for infection,took 3 doses so far. Staff not sure for what infection. Will hold. · IV rocephin as above  · Procal-WNL, follow-up with  · ST eval -- okay to start Level 3 dysphagia diet    Elevated liver enzymes  Assessment & Plan  · Mildly elevated on admission  · AST 54  · Trend LFT    CAD (coronary artery disease)  Assessment & Plan  · Continue Lipitor    Hypertension  Assessment & Plan  On Losartan, Minipress at home  Held Losartan on admission due to elevated Cr  Blood pressure elevated  · Continue Minipress  · Resume losartan  · Hydralazine prn        Elevated serum creatinine-resolved as of 7/26/2023  Assessment & Plan  · Cr 1.51 >1.35 > 1.11  · Unclear baseline  · Resolving with gentle IVF  · Continue to trend    Depression with anxiety  Assessment & Plan  · Hold home zoloft until mentation improves    Hyperlipidemia  Assessment & Plan  · Continue statin          VTE Pharmacologic Prophylaxis:   Moderate Risk (Score 3-4) - Pharmacological DVT Prophylaxis Ordered: enoxaparin (Lovenox). Patient Centered Rounds: I performed bedside rounds with nursing staff today. Discussions with Specialists or Other Care Team Provider: Neurology    Education and Discussions with Family / Patient: Updated  (sister) at bedside. Later in the day. Patient is much improved now close to baseline. He does not offer any complaints including any pain. His appetite is fair probably because he does not like hospital food. Time Spent for Care: 45 minutes.  More than 50% of total time spent on counseling and coordination of care as described above. Current Length of Stay: 1 day(s)  Current Patient Status: Inpatient   Certification Statement: The patient will continue to require additional inpatient hospital stay due to Monitoring of mental status  Discharge Plan: Anticipate discharge in 24-48 hrs to rehab facility. Code Status: Level 1 - Full Code    Subjective:   Patient seen at bedside this morning, no acute distress. Chris  utilized. Patient is alert and interactive, much improved from yesterday. Patient oriented to self but disoriented to time and place. Patient understands he had a fall. Patient states he has pain but unable to localize where. Follow simple commands, answers simple questions with frequent redirection. Objective:     Vitals:   Temp (24hrs), Av.1 °F (36.7 °C), Min:97.4 °F (36.3 °C), Max:98.7 °F (37.1 °C)    Temp:  [97.4 °F (36.3 °C)-98.7 °F (37.1 °C)] 98.7 °F (37.1 °C)  HR:  [71-85] 81  Resp:  [16-18] 18  BP: (133-188)/(73-99) 171/73  SpO2:  [92 %-98 %] 98 %  Body mass index is 26.11 kg/m². Input and Output Summary (last 24 hours): Intake/Output Summary (Last 24 hours) at 2023 1459  Last data filed at 2023 0252  Gross per 24 hour   Intake 10 ml   Output --   Net 10 ml       Physical Exam:   Physical Exam  Vitals reviewed. Constitutional:       General: He is awake. He is not in acute distress. Appearance: Normal appearance. HENT:      Head: Normocephalic and atraumatic. Eyes:      General: Lids are normal.   Cardiovascular:      Rate and Rhythm: Normal rate and regular rhythm. Heart sounds: Normal heart sounds. Pulmonary:      Effort: Pulmonary effort is normal.      Breath sounds: Normal breath sounds. Abdominal:      General: Abdomen is flat. Bowel sounds are normal.      Palpations: Abdomen is soft. Musculoskeletal:      Cervical back: Neck supple. Right lower leg: No edema. Left lower leg: No edema. Skin:     General: Skin is warm and dry. Neurological:      Mental Status: He is alert. He is disoriented. Comments: Limited assessment secondary to language barrier, Chris interpretation services utilized. Patient is alert, answers simple questions with redirection and prompting. Follow simple commands but remains disoriented to place and time. Psychiatric:         Behavior: Behavior is cooperative. Additional Data:     Labs:  Results from last 7 days   Lab Units 07/25/23  0738   WBC Thousand/uL 9.10   HEMOGLOBIN g/dL 12.5   HEMATOCRIT % 39.0   PLATELETS Thousands/uL 259   NEUTROS PCT % 57   LYMPHS PCT % 24   MONOS PCT % 11   EOS PCT % 6     Results from last 7 days   Lab Units 07/25/23  0738 07/24/23  0619 07/23/23  2053   SODIUM mmol/L 136   < > 138   POTASSIUM mmol/L 4.6   < > 4.9   CHLORIDE mmol/L 104   < > 106   CO2 mmol/L 25   < > 25   BUN mg/dL 21   < > 28*   CREATININE mg/dL 1.11   < > 1.51*   ANION GAP mmol/L 7   < > 7   CALCIUM mg/dL 8.7   < > 8.7   ALBUMIN g/dL  --   --  3.4*   TOTAL BILIRUBIN mg/dL  --   --  0.67   ALK PHOS U/L  --   --  50   ALT U/L  --   --  38   AST U/L  --   --  54*   GLUCOSE RANDOM mg/dL 101   < > 118    < > = values in this interval not displayed.          Results from last 7 days   Lab Units 07/23/23 2008   POC GLUCOSE mg/dl 158*         Results from last 7 days   Lab Units 07/24/23  0619   PROCALCITONIN ng/ml 0.08       Lines/Drains:  Invasive Devices     Peripheral Intravenous Line  Duration           Peripheral IV 07/23/23 Right Antecubital 1 day          Drain  Duration           External Urinary Catheter Medium 1 day                      Imaging: Reviewed radiology reports from this admission including: chest CT scan and MRI brain    Recent Cultures (last 7 days):         Last 24 Hours Medication List:   Current Facility-Administered Medications   Medication Dose Route Frequency Provider Last Rate   • acetaminophen  325 mg Oral Q6H PRN Mortimer Milliner, MD     • acetaminophen  650 mg Oral Q6H Yaritza Lovell MD     • atorvastatin  5 mg Oral Daily LIZA Arnold     • cefTRIAXone  1,000 mg Intravenous Q24H LIZA Arnold 1,000 mg (07/25/23 0252)   • enoxaparin  30 mg Subcutaneous Daily LIZA Arnold     • famotidine  20 mg Intravenous Q24H [facility] David Serrano MD     • hydrALAZINE  5 mg Intravenous Q4H PRN LIZA Arnold     • oxybutynin  5 mg Oral TID LIZA Arnold     • polyethylene glycol  17 g Oral Daily LIZA Arnold     • prazosin  1 mg Oral HS LIZA Arnold          Today, Patient Was Seen By: Mortimer Milliner, MD    ** Please Note: "This note has been constructed using a voice recognition system. Therefore there may be syntax, spelling, and/or grammatical errors.  Please call if you have any questions. "**

## 2023-07-25 NOTE — UTILIZATION REVIEW
Initial Clinical Review    Admission: Date/Time/Statement:   Admission Orders (From admission, onward)     Ordered        07/24/23 0027  INPATIENT ADMISSION  Once                      Orders Placed This Encounter   Procedures   • INPATIENT ADMISSION     Standing Status:   Standing     Number of Occurrences:   1     Order Specific Question:   Level of Care     Answer:   Med Surg [16]     Order Specific Question:   Estimated length of stay     Answer:   More than 2 Midnights     Order Specific Question:   Certification     Answer:   I certify that inpatient services are medically necessary for this patient for a duration of greater than two midnights. See H&P and MD Progress Notes for additional information about the patient's course of treatment. ED Arrival Information     Expected   -    Arrival   7/23/2023 20:04    Acuity   Urgent            Means of arrival   Ambulance    Escorted by   [facility]   [facility]    Admission type   Emergency            Arrival complaint   alt mental status              Chief Complaint   Patient presents with   • Altered Mental Status     Patients family requested patient to come in from [facility], has been on flexeril 5mg last given at 13:00, also patient fell last night out of wheel chair and was under wheel chair, was assessed at [facility] and not sent in      Initial Presentation: 7/23/2023 @ 2040  89 yom to ER from nursing facility for evaluation mental status changes; somnolent this afternoon. Pt s/p fall out of w/c yesterday. Pt recently started on a course of Flexeril in rehab after diagnosis of sprained ribs, which he sustained after mechanical fall. Presents lethargic, GCS 13. Admission work-up showing Bibasilar dependent atelectasis and scattered lower lobe predominant hazy pulmonary infiltrates on imaging, elevated creatinine. Admitted to inpatient status 7/24 for acute encephalopathy, likely 2nd Flexeril.  Started on IVABT for questionable aspiration on CT, neuro consulted. Date: 7/25/23    Day 3: Has surpassed a 2nd midnight with active treatments and services, which include IVABT, IVF, neuro checks, monitoring mental status/orientation. Neuro consult pending    Per neuro: acute encephalopathy  Patient appears awake and alert today and is able to follow simple commands and tell me his name and also his daughter's name. Suspect encephalopathy in setting of Flexeril use, as well as possible elevation of creatinine (unclear baseline) and is now improving. MRI brain completed and demonstrates no acute intracranial abnormality. Atrophy and chronic microangiopathy noted. Recommend avoid sedating medications and would hold off on muscle relaxants at this time. Vit B12 and Vit D pending. Will check folate.        ED Triage Vitals   Temperature Pulse Respirations Blood Pressure SpO2   07/23/23 2009 07/23/23 2009 07/23/23 2009 07/23/23 2009 07/23/23 2009   97.6 °F (36.4 °C) 80 16 162/70 93 %      Temp Source Heart Rate Source Patient Position - Orthostatic VS BP Location FiO2 (%)   07/23/23 2009 07/23/23 2009 07/23/23 2009 07/23/23 2009 --   Tympanic Monitor Lying Right arm       Pain Score       07/24/23 0559       No Pain          Wt Readings from Last 1 Encounters:   07/25/23 82.6 kg (182 lb)     Additional Vital Signs:   Date/Time Temp Pulse Resp BP MAP (mmHg) SpO2 Calculated FIO2 (%) - Nasal Cannula Nasal Cannula O2 Flow Rate (L/min) O2 Device Patient Position - Orthostatic VS   07/25/23 07:51:52 97.4 °F (36.3 °C) Abnormal  85 16 188/99 Abnormal  129 93 % -- -- -- --   07/24/23 23:43:03 -- 71 -- 168/82 111 98 % -- -- -- --   07/24/23 23:41:50 -- -- -- 168/82 111 -- -- -- -- --   07/24/23 23:34:54 97.9 °F (36.6 °C) 82 18 171/82 Abnormal  112 92 % -- -- -- --   07/24/23 18:39:01 98.5 °F (36.9 °C) -- 18 133/88 103 -- -- -- -- --   07/24/23 15:06:44 -- 77 18 157/93 114 96 % -- -- -- --   07/24/23 13:55:39 -- 69 -- 153/73 100 97 % -- -- -- --   07/24/23 13:03:59 -- 74 -- 170/91 117 98 % -- -- -- --   07/24/23 07:49:27 99 °F (37.2 °C) 91 18 174/95 Abnormal  121 97 % -- -- -- --   07/24/23 06:02:27 98.6 °F (37 °C) 79 16 134/79 97 93 % -- -- Nasal cannula Lying   07/24/23 02:02:20 98.6 °F (37 °C) 74 20 117/84 95 93 % -- -- -- --   07/24/23 0115 -- 86 22 174/74 Abnormal  106 95 % 32 3 L/min Nasal cannula Lying   07/24/23 0100 -- 88 20 192/79 Abnormal  114 91 % 32 3 L/min Nasal cannula --   07/24/23 0000 -- 78 21 162/70 101 91 % 32 3 L/min Nasal cannula Lying   07/23/23 2345 -- 78 16 152/67 96 92 % 32 3 L/min Nasal cannula Lying   07/23/23 2330 -- 76 21 158/73 105 93 % 32 3 L/min Nasal cannula Lying   07/23/23 2315 -- 82 22 167/79 113 92 % 32 3 L/min Nasal cannula Lying   07/23/23 2300 -- 82 21 175/74 Abnormal  107 91 % 32 3 L/min Nasal cannula Lying   07/23/23 2245 -- 84 16 173/71 Abnormal  102 92 % 32 3 L/min Nasal cannula Lying   07/23/23 2230 -- 86 22 198/84 Abnormal  120 91 % 32 3 L/min Nasal cannula Lying     Pertinent Labs/Diagnostic Test Results:   MRI brain wo contrast   Final Result  (07/24 2322)      1. No MR evidence of acute ischemia. 2. Atrophy and chronic microvascular changes. CT chest abdomen pelvis w contrast   Final Result (07/24 0004)      1. No evidence of acute visceral/vascular injury in the thorax, abdomen, or pelvis. 2.  Bibasilar dependent atelectasis and scattered lower lobe predominant hazy pulmonary infiltrates noted which could be related to aspiration. Attention on follow-up examination recommended. 3.  Trace right pleural effusion. 4.  Calcific atherosclerosis. 5.  Cholelithiasis without evidence for acute cholecystitis or significant biliary ductal dilatation. 6.  Fat-containing left lateral lower abdominal wall hernia. 7.  Age-indeterminate but more likely to be chronic compression fracture deformities of the T11 and L1 vertebrae as described above.  Recommend clinical correlation with physical exam findings for point tenderness in these areas to exclude superimposed    acute injury. CT cervical spine without contrast   Final Result  (07/23 2324)      Multilevel degenerative changes without acute cervical spine fracture or traumatic malalignment. CT head without contrast   Final Result  (07/23 2321)      No acute intracranial hemorrhage or depressed calvarial fracture. Senescent changes as described above.      7/23 Ekg=  Sinus rhythm with 1st degree A-V block  Left axis deviation  Septal infarct , age undetermined    Results from last 7 days   Lab Units 07/25/23 0738 07/24/23 0905 07/23/23 2053   WBC Thousand/uL 9.10 10.32* 9.79   HEMOGLOBIN g/dL 12.5 11.8* 11.8*   HEMATOCRIT % 39.0 37.1 36.6   PLATELETS Thousands/uL 259 268 265   NEUTROS ABS Thousands/µL 5.29 6.15 5.72     Results from last 7 days   Lab Units 07/25/23 0738 07/24/23 0619 07/23/23 2053   SODIUM mmol/L 136 138 138   POTASSIUM mmol/L 4.6 4.7 4.9   CHLORIDE mmol/L 104 107 106   CO2 mmol/L 25 18* 25   ANION GAP mmol/L 7 13 7   BUN mg/dL 21 27* 28*   CREATININE mg/dL 1.11 1.35* 1.51*   EGFR ml/min/1.73sq m 56 44 38   CALCIUM mg/dL 8.7 8.4 8.7   MAGNESIUM mg/dL  --  2.1 1.9     Results from last 7 days   Lab Units 07/23/23 2053   AST U/L 54*   ALT U/L 38   ALK PHOS U/L 50   TOTAL PROTEIN g/dL 6.5   ALBUMIN g/dL 3.4*   TOTAL BILIRUBIN mg/dL 0.67   AMMONIA umol/L 10*     Results from last 7 days   Lab Units 07/23/23 2008   POC GLUCOSE mg/dl 158*     Results from last 7 days   Lab Units 07/25/23 0738 07/24/23 0619 07/23/23 2053   GLUCOSE RANDOM mg/dL 101 110 118     Results from last 7 days   Lab Units 07/23/23  2100   [address] [address] [place] ART  7.406   PCO2 ART mm Hg 36.8   PO2 ART mm Hg 68.9*   HCO3 ART mmol/L 22.6   BASE EXC ART mmol/L -1.7   O2 CONTENT ART mL/dL 15.8*   O2 HGB, ARTERIAL % 92.8*   ABG SOURCE  Radial, Left     Results from last 7 days   Lab Units 07/24/23  0643 07/23/23  2305 07/23/23 2053   HS TNI 0HR ng/L  --   --  20   HS TNI 2HR ng/L  --  18  --    HSTNI D2 ng/L  --  -2  --    HS TNI 4HR ng/L 18  --   --    HSTNI D4 ng/L -2  --   --      Results from last 7 days   Lab Units 07/25/23  0738   TSH 3RD GENERATON uIU/mL 3.439     Results from last 7 days   Lab Units 07/24/23  0619   PROCALCITONIN ng/ml 0.08     Results from last 7 days   Lab Units 07/23/23  2306   CLARITY UA  Clear   COLOR UA  Yellow   SPEC GRAV UA  1.025   PH UA  5.0   GLUCOSE UA mg/dl Negative   KETONES UA mg/dl Negative   BLOOD UA  Negative   PROTEIN UA mg/dl Negative   NITRITE UA  Negative   BILIRUBIN UA  Negative   UROBILINOGEN UA E.U./dl 0.2   LEUKOCYTES UA  Negative       ED Treatment:   Medication Administration from 07/23/2023 2004 to 07/24/2023 0147       Date/Time Order Dose Route Action     07/23/2023 2218 EDT iohexol (OMNIPAQUE) 350 MG/ML injection (SINGLE-DOSE) 100 mL 100 mL Intravenous Given        Past Medical History:   Diagnosis Date   • Anxiety    • Atherosclerotic coronary vascular disease    • Depressive disorder    • Hyperlipidemia    • Hypertension      Present on Admission:  • Hypertension  • Hyperlipidemia  • Acute encephalopathy  • Depression with anxiety  • CAD (coronary artery disease)  • Elevated serum creatinine  • Elevated liver enzymes  • Abnormal CT scan  • Compression fracture of vertebral column (HCC)    Admitting Diagnosis: Altered mental status [R41.82]  Acute encephalopathy [G93.40]  Age/Sex: 80 y.o. male  Admission Orders:  Neuro checks q4h  Pt/ot/st eval & tx  Cont pulse ox  O2 to keep sats>90%  scd  Consult neuro    Scheduled Medications:  atorvastatin, 5 mg, Oral, Daily  cefTRIAXone, 1,000 mg, Intravenous, Q24H  enoxaparin, 30 mg, Subcutaneous, Daily  famotidine, 20 mg, Intravenous, Q24H ALESSANDRO  oxybutynin, 5 mg, Oral, TID  polyethylene glycol, 17 g, Oral, Daily  prazosin, 1 mg, Oral, HS    Continuous IV Infusions:  lactated ringers, 60 mL/hr, Intravenous, Continuous    PRN Meds:  acetaminophen, 650 mg, Oral, Q6H PRN  hydrALAZINE, 5 mg, Intravenous, Q4H PRN    Network Utilization Review Department  ATTENTION: Please call with any questions or concerns to [phone] and carefully listen to the prompts so that you are directed to the right person. All voicemails are confidential.  Filiberto Rubin all requests for admission clinical reviews, approved or denied determinations and any other requests to dedicated fax number below belonging to the campus where the patient is receiving treatment.  List of dedicated fax numbers for the Facilities:  [place] DENIALS (Administrative/Medical Necessity) [phone] [address] [facility] (Maternity/NICU/Pediatrics) [phone]   [address] [place] [address] [phone]   [facility] [address] [facility] [phone]   [address] [facility] [address] [facility] [address] [phone]   [address] [facility] [address] [facility]  [address] [phone]

## 2023-07-25 NOTE — PLAN OF CARE
Problem: Potential for Falls  Goal: Patient will remain free of falls  Description: INTERVENTIONS:  - Educate patient/family on patient safety including physical limitations  - Instruct patient to call for assistance with activity   - Consult OT/PT to assist with strengthening/mobility   - Keep Call bell within reach  - Keep bed low and locked with side rails adjusted as appropriate  - Keep care items and personal belongings within reach  - Initiate and maintain comfort rounds  - Make Fall Risk Sign visible to staff  - Offer Toileting every 2  Hours, in advance of need  - Initiate/Maintain  2 alarm  - Obtain necessary fall risk management equipment: walker  - Apply yellow socks and bracelet for high fall risk patients  - Consider moving patient to room near nurses station  Outcome: Progressing     Problem: Prexisting or High Potential for Compromised Skin Integrity  Goal: Skin integrity is maintained or improved  Description: INTERVENTIONS:  - Identify patients at risk for skin breakdown  - Assess and monitor skin integrity  - Assess and monitor nutrition and hydration status  - Monitor labs   - Assess for incontinence   - Turn and reposition patient  - Assist with mobility/ambulation  - Relieve pressure over bony prominences  - Avoid friction and shearing  - Provide appropriate hygiene as needed including keeping skin clean and dry  - Evaluate need for skin moisturizer/barrier cream  - Collaborate with interdisciplinary team   - Patient/family teaching  - Consider wound care consult   Outcome: Progressing     Problem: MOBILITY - ADULT  Goal: Maintain or return to baseline ADL function  Description: INTERVENTIONS:  -  Assess patient's ability to carry out ADLs; assess patient's baseline for ADL function and identify physical deficits which impact ability to perform ADLs (bathing, care of mouth/teeth, toileting, grooming, dressing, etc.)  - Assess/evaluate cause of self-care deficits   - Assess range of motion  - Assess patient's mobility; develop plan if impaired  - Assess patient's need for assistive devices and provide as appropriate  - Encourage maximum independence but intervene and supervise when necessary  - Involve family in performance of ADLs  - Assess for home care needs following discharge   - Consider OT consult to assist with ADL evaluation and planning for discharge  - Provide patient education as appropriate  Outcome: Progressing  Goal: Maintains/Returns to pre admission functional level  Description: INTERVENTIONS:  - Perform BMAT or MOVE assessment daily.   - Set and communicate daily mobility goal to care team and patient/family/caregiver. - Collaborate with rehabilitation services on mobility goals if consulted  - Perform Range of Motion 2  times a day. - Reposition patient every 2 hours.   - Dangle patient 2  times a day  - Stand patient 2  times a day  - Ambulate patient 2  times a day  - Out of bed to chair 2 times a day   - Out of bed for meals 2  times a day  - Out of bed for toileting  - Record patient progress and toleration of activity level   Outcome: Progressing     Problem: SAFETY ADULT  Goal: Patient will remain free of falls  Description: INTERVENTIONS:  - Educate patient/family on patient safety including physical limitations  - Instruct patient to call for assistance with activity   - Consult OT/PT to assist with strengthening/mobility   - Keep Call bell within reach  - Keep bed low and locked with side rails adjusted as appropriate  - Keep care items and personal belongings within reach  - Initiate and maintain comfort rounds  - Make Fall Risk Sign visible to staff  - Offer Toileting every 2  Hours, in advance of need  - Initiate/Maintain bed alarm  - Obtain necessary fall risk management equipment: walker  - Apply yellow socks and bracelet for high fall risk patients  - Consider moving patient to room near nurses station  Outcome: Progressing  Goal: Maintain or return to baseline ADL function  Description: INTERVENTIONS:  -  Assess patient's ability to carry out ADLs; assess patient's baseline for ADL function and identify physical deficits which impact ability to perform ADLs (bathing, care of mouth/teeth, toileting, grooming, dressing, etc.)  - Assess/evaluate cause of self-care deficits   - Assess range of motion  - Assess patient's mobility; develop plan if impaired  - Assess patient's need for assistive devices and provide as appropriate  - Encourage maximum independence but intervene and supervise when necessary  - Involve family in performance of ADLs  - Assess for home care needs following discharge   - Consider OT consult to assist with ADL evaluation and planning for discharge  - Provide patient education as appropriate  Outcome: Progressing  Goal: Maintains/Returns to pre admission functional level  Description: INTERVENTIONS:  - Perform BMAT or MOVE assessment daily.   - Set and communicate daily mobility goal to care team and patient/family/caregiver. - Collaborate with rehabilitation services on mobility goals if consulted  - Perform Range of Motion 2 times a day. - Reposition patient every 2 hours.   - Dangle patient 2  times a day  - Stand patient 2  times a day  - Ambulate patient 2  times a day  - Out of bed to chair 2  times a day   - Out of bed for meals 2 times a day  - Out of bed for toileting  - Record patient progress and toleration of activity level   Outcome: Progressing

## 2023-07-25 NOTE — CONSULTS
Consultation - Neurology   Eugenie Holter 80 y.o. male MRN: [id number]  Unit/Bed#: [address] [place] [address] Encounter: [id number]      Assessment/Plan     * Acute encephalopathy  Assessment & Plan  80year old male with HTN, HLD, anxiety, depression, CAD who presents to the hospital with somnolence and encephalopathy after a fall at care facility. He was brought to the hospital for evaluation and per review of notes, patient had been started on Flexeril a few days prior to admission and symptoms began after that medication was started. He was noted to have mild elevation of creatinine and LFTs when he presented to the ED. Patient appears awake and alert today and is able to follow simple commands and tell me his name and also his daughter's name. Suspect encephalopathy in setting of Flexeril use, as well as possible elevation of creatinine (unclear baseline) and is now improving. MRI brain completed and demonstrates no acute intracranial abnormality. Atrophy and chronic microangiopathy noted. Plan:   - Given mentation is improving, no additional neurologic work-up recommended at this time.   - Recommend avoid sedating medications and would hold off on muscle relaxants at this time. - Vit B12 and Vit D pending.   - Will check folate. - Ammonia <10, UA unremarkable, TSH normal at 3.439.  - Recommend outpatient neurology follow-up for formal memory testing after acute hospitalization. Discussed findings of MRI brain as well as recommendation for outpatient neurology follow-up with patient's daughter via telephone and she expressed understanding. Elevated serum creatinine  Assessment & Plan  - Creatinine improved with IVF. Elevated liver enzymes  Assessment & Plan  - Trend as per medicine team.    Hypertension  Assessment & Plan  - Goal normotension.   - Management as per medicine team.        Eugenie Holter will need follow up in 8-10 weeks with general attending or advance practitioner. He will not require outpatient neurological testing. History of Present Illness     Reason for Consult / Principal Problem: Acute encephalopathy  Hx and PE limited by: Language barrier, mental status.  [id number] [employer] Vences) was utilized during evaluation. HPI: Cristhian Carranza is a 80 y.o. (handedness not determinable) male with HTN, HLD, anxiety, depression, CAD, who presents with somnolence and encephalopathy. To review, patient with no prior visits available for chart review and no records available in Care Everywhere. Patient reportedly presented from a care center after he fell out of a wheelchair, family requested patient be brought to ED for evaluation. He was noted to be somnolent by family. Of note, patient was recently started on Flexeril 5 mg TID on Saturday July 22 after he sustained sprained ribs in a mechanical fall. He was brought to the ED and underwent imaging including CTH which demonstrated no acute intracranial hemorrhage or depressed calvarial fracture as well as CT C/A/P with contrast which demonstrated bibasilar dependent atelectasis and scattered lower lobe predominant hazy pulmonary infiltrates, trace right pleural effusion, calcific atherosclerosis, cholelithiasis, left lateral lower abdominal wall hernia and age indeterminate but likely chronic compression deformities of the T11 and L1 vertebrae. In the ED, he was noted to have elevated creatinine and mild elevation in AST. MRI brain was completed on July 24, 2023 and this demonstrated atrophy and chronic microvascular changes but no MR evidence of acute ischemia. Neurology team is asked to evaluate given altered mental status. Per discussion with patient's daughter, Sam Astudillo, via telephone, at baseline, patient is able to hold conversations with family and was able to walk with assistance. He was able to feed himself and she notes he never had any difficulty with swallowing before.  She notes patient has never been formally diagnosed with dementia. Per discussion with staff who had seen patient yesterday, he is much improved and more alert and able to interact. Speech therapy team worked with patient this morning and he was cleared for soft foods and regular liquids. Inpatient consult to Neurology  Consult performed by: Rajendra Guthrie PA-C  Consult ordered by: La Nena Wilkes MD          Review of Systems   Constitutional: Negative for chills, fatigue and fever. HENT: Positive for trouble swallowing. Eyes: Negative for visual disturbance. Respiratory: Negative for shortness of breath. Cardiovascular: Negative for chest pain. Gastrointestinal: Negative for abdominal pain, nausea and vomiting. Musculoskeletal: Negative for back pain and neck pain. Skin: Negative for rash. Neurological: Negative for dizziness, speech difficulty, weakness, light-headedness, numbness and headaches. Psychiatric/Behavioral: Positive for confusion. Historical Information   Past Medical History:   Diagnosis Date   • Anxiety    • Atherosclerotic coronary vascular disease    • Depressive disorder    • Hyperlipidemia    • Hypertension      History reviewed. No pertinent surgical history. Social History   Social History     Substance and Sexual Activity   Alcohol Use Not Currently     Social History     Substance and Sexual Activity   Drug Use Never     E-Cigarette/Vaping   • E-Cigarette Use Never User      E-Cigarette/Vaping Substances     Social History     Tobacco Use   Smoking Status Never   Smokeless Tobacco Never     Family History: History reviewed. No pertinent family history. Review of previous medical records was completed. No prior records available for review.     Meds/Allergies   Scheduled Meds:  Current Facility-Administered Medications   Medication Dose Route Frequency Provider Last Rate   • acetaminophen  650 mg Oral Q6H PRN LIZA Arnold     • atorvastatin  5 mg Oral Daily LIZA Saucedo     • cefTRIAXone  1,000 mg Intravenous Q24H Cuitimoteo Bucknerrik, CRNP 1,000 mg (07/25/23 0252)   • enoxaparin  30 mg Subcutaneous Daily Cuitimoteo Pérez, CRNP     • famotidine  20 mg Intravenous Q24H [address] León Lai MD     • hydrALAZINE  5 mg Intravenous Q4H PRN Cuiyin Sitarik, CRNP     • lactated ringers  60 mL/hr Intravenous Continuous Cuiyin Sitarik, CRNP 60 mL/hr (07/24/23 2244)   • oxybutynin  5 mg Oral TID Cuiyin Sitarik, CRNP     • polyethylene glycol  17 g Oral Daily Cuiyin Sitarik, CRNP     • prazosin  1 mg Oral HS Cuitimoteo Bucknerrik, CRNP       Continuous Infusions:lactated ringers, 60 mL/hr, Last Rate: 60 mL/hr (07/24/23 2244)      PRN Meds:.•  acetaminophen  •  hydrALAZINE      No Known Allergies    Objective   Vitals:Blood pressure (!) 188/99, pulse 85, temperature (!) 97.4 °F (36.3 °C), resp. rate 16, height 5' 10" (1.778 m), weight 82.6 kg (182 lb), SpO2 93 %. ,Body mass index is 26.11 kg/m². Intake/Output Summary (Last 24 hours) at 7/25/2023 0830  Last data filed at 7/25/2023 0252  Gross per 24 hour   Intake 10 ml   Output --   Net 10 ml       Invasive Devices: Invasive Devices     Peripheral Intravenous Line  Duration           Peripheral IV 07/23/23 Right Antecubital 1 day          Drain  Duration           External Urinary Catheter Medium 1 day                Physical Exam  Constitutional:       Appearance: Normal appearance. HENT:      Head: Normocephalic and atraumatic. Mouth/Throat:      Mouth: Mucous membranes are moist.      Pharynx: Oropharynx is clear. No oropharyngeal exudate or posterior oropharyngeal erythema. Eyes:      General: No scleral icterus. Right eye: No discharge. Left eye: No discharge. Extraocular Movements: Extraocular movements intact. Conjunctiva/sclera: Conjunctivae normal.      Pupils: Pupils are equal, round, and reactive to light. Cardiovascular:      Rate and Rhythm: Normal rate and regular rhythm.    Pulmonary:      Effort: Pulmonary effort is normal. No respiratory distress. Breath sounds: Normal breath sounds. Abdominal:      General: There is no distension. Palpations: Abdomen is soft. Musculoskeletal:         General: Normal range of motion. Cervical back: Normal range of motion and neck supple. Right lower leg: No edema. Left lower leg: No edema. Skin:     General: Skin is warm and dry. Findings: No erythema or rash. Neurological:      Mental Status: He is alert. Deep Tendon Reflexes:      Reflex Scores:       Bicep reflexes are 1+ on the right side and 1+ on the left side. Brachioradialis reflexes are 1+ on the right side and 1+ on the left side. Patellar reflexes are 1+ on the right side and 1+ on the left side. Achilles reflexes are 0 on the right side and 0 on the left side. Comments: Oriented to person only. Psychiatric:         Mood and Affect: Mood normal.         Speech: Speech normal.         Behavior: Behavior normal.       Neurologic Exam     Mental Status   Oriented to person. Disoriented to place. Disoriented to time. Attention: decreased. Concentration: decreased. Speech: speech is normal   Level of consciousness: alert  Limited assessment secondary to language barrier despite utilizing  services. Patient is alert and interactive, able to follow some simple commands. Oriented to self and also able to name his daughter. Speech is clear without evidence of dysarthria. Cranial Nerves     CN II   Right visual field deficit: Blink to threat intact. Left visual field deficit: Blink to threat intact. CN III, IV, VI   Pupils are equal, round, and reactive to light. Right pupil: Size: 3 mm. Shape: regular. Reactivity: brisk. Consensual response: intact. Left pupil: Size: 3 mm. Shape: regular. Reactivity: brisk. Consensual response: intact.    Nystagmus: none   Ophthalmoparesis: none  Upgaze: normal  Downgaze: normal  Conjugate gaze: present    CN VII   Right facial weakness: none  Left facial weakness: none    CN XII   Tongue: not atrophic  Fasciculations: absent  Tongue deviation: none  Limited assessment secondary to language barrier and difficulty with following more complex commands. Motor Exam   Muscle bulk: decreased  Overall muscle tone: normal  Right arm tone: normal  Left arm tone: normal  Right leg tone: normal  Left leg tone: normalMoving all extremities well and following simple commands x 4. Unable to formally assess motor strength but no focal motor asymmetry noted. Sensory Exam   Unable to reliably assess. Gait, Coordination, and Reflexes     Gait  Gait: (Deferred for safety)    Tremor   Resting tremor: absent    Reflexes   Right brachioradialis: 1+  Left brachioradialis: 1+  Right biceps: 1+  Left biceps: 1+  Right patellar: 1+  Left patellar: 1+  Right achilles: 0  Left achilles: 0  Right plantar: normal  Left plantar: normalLimited assessment secondary to difficulty following more complex commands.         Lab Results:   Recent Results (from the past 24 hour(s))   Basic metabolic panel    Collection Time: 07/25/23  7:38 AM   Result Value Ref Range    Sodium 136 135 - 147 mmol/L    Potassium 4.6 3.5 - 5.3 mmol/L    Chloride 104 96 - 108 mmol/L    CO2 25 21 - 32 mmol/L    ANION GAP 7 mmol/L    BUN 21 5 - 25 mg/dL    Creatinine 1.11 0.60 - 1.30 mg/dL    Glucose 101 65 - 140 mg/dL    Calcium 8.7 8.4 - 10.2 mg/dL    eGFR 56 ml/min/1.73sq m   CBC and differential    Collection Time: 07/25/23  7:38 AM   Result Value Ref Range    WBC 9.10 4.31 - 10.16 Thousand/uL    RBC 3.91 3.88 - 5.62 Million/uL    Hemoglobin 12.5 12.0 - 17.0 g/dL    Hematocrit 39.0 36.5 - 49.3 %     (H) 82 - 98 fL    MCH 32.0 26.8 - 34.3 pg    MCHC 32.1 31.4 - 37.4 g/dL    RDW 14.0 11.6 - 15.1 %    MPV 11.7 8.9 - 12.7 fL    Platelets 616 002 - 757 Thousands/uL    nRBC 0 /100 WBCs    Neutrophils Relative 57 43 - 75 %    Immat GRANS % 1 0 - 2 %    Lymphocytes Relative 24 14 - 44 %    Monocytes Relative 11 4 - 12 %    Eosinophils Relative 6 0 - 6 %    Basophils Relative 1 0 - 1 %    Neutrophils Absolute 5.29 1.85 - 7.62 Thousands/µL    Immature Grans Absolute 0.07 0.00 - 0.20 Thousand/uL    Lymphocytes Absolute 2.14 0.60 - 4.47 Thousands/µL    Monocytes Absolute 0.97 0.17 - 1.22 Thousand/µL    Eosinophils Absolute 0.50 0.00 - 0.61 Thousand/µL    Basophils Absolute 0.13 (H) 0.00 - 0.10 Thousands/µL   TSH, 3rd generation with Free T4 reflex    Collection Time: 07/25/23  7:38 AM   Result Value Ref Range    TSH 3RD GENERATON 3.439 0.450 - 4.500 uIU/mL       Imaging Studies: I have personally reviewed pertinent reports. and I have personally reviewed pertinent films in PACS. CTH- No acute intracranial hemorrhage or depressed calvarial fracture. Senescent changes. MRI brain without contrast- No MR evidence of acute ischemia. Atrophy and chronic microvascular changes. CT cervical spine without contrast- Multilevel degenerative changes without acute cervical spine fracture or traumatic malalignment. CT C/A/P- No evidence of acute visceral/vascular injury in the thorax, abdomen or pelvis. Bibasilar dependent atelectasis and scattered lower lobe predominant hazy pulmonary infiltrates noted which could be related to aspiration. Attention on follow-up examination recommended. Trace right pleural effusion. Calcific atherosclerosis. Cholelithiasis without evidence for acute cholecystitis or significant biliary ductal dilatation. Fat-containing left lateral lower abdominal wall hernia. Age-indeterminate but more likely to be chronic compression fracture deformities of the T11 and L1 vertebrae.      VTE Prophylaxis: Enoxaparin (Lovenox)

## 2023-07-25 NOTE — PLAN OF CARE
Problem: Potential for Falls  Goal: Patient will remain free of falls  Description: INTERVENTIONS:  - Educate patient/family on patient safety including physical limitations  - Instruct patient to call for assistance with activity   - Consult OT/PT to assist with strengthening/mobility   - Keep Call bell within reach  - Keep bed low and locked with side rails adjusted as appropriate  - Keep care items and personal belongings within reach  - Initiate and maintain comfort rounds  - Make Fall Risk Sign visible to staff  - Offer Toileting every 2 Hours, in advance of need  - Initiate/Maintain bed alarm  - Obtain necessary fall risk management equipment: yellow bracelet  - Apply yellow socks and bracelet for high fall risk patients  - Consider moving patient to room near nurses station  Outcome: Progressing     Problem: Prexisting or High Potential for Compromised Skin Integrity  Goal: Skin integrity is maintained or improved  Description: INTERVENTIONS:  - Identify patients at risk for skin breakdown  - Assess and monitor skin integrity  - Assess and monitor nutrition and hydration status  - Monitor labs   - Assess for incontinence   - Turn and reposition patient  - Assist with mobility/ambulation  - Relieve pressure over bony prominences  - Avoid friction and shearing  - Provide appropriate hygiene as needed including keeping skin clean and dry  - Evaluate need for skin moisturizer/barrier cream  - Collaborate with interdisciplinary team   - Patient/family teaching  - Consider wound care consult   Outcome: Progressing     Problem: MOBILITY - ADULT  Goal: Maintain or return to baseline ADL function  Description: INTERVENTIONS:  -  Assess patient's ability to carry out ADLs; assess patient's baseline for ADL function and identify physical deficits which impact ability to perform ADLs (bathing, care of mouth/teeth, toileting, grooming, dressing, etc.)  - Assess/evaluate cause of self-care deficits   - Assess range of motion  - Assess patient's mobility; develop plan if impaired  - Assess patient's need for assistive devices and provide as appropriate  - Encourage maximum independence but intervene and supervise when necessary  - Involve family in performance of ADLs  - Assess for home care needs following discharge   - Consider OT consult to assist with ADL evaluation and planning for discharge  - Provide patient education as appropriate  Outcome: Progressing  Goal: Maintains/Returns to pre admission functional level  Description: INTERVENTIONS:  - Perform BMAT or MOVE assessment daily.   - Set and communicate daily mobility goal to care team and patient/family/caregiver. - Collaborate with rehabilitation services on mobility goals if consulted  - Perform Range of Motion 4 times a day. - Reposition patient every 2 hours.   - Dangle patient 3 times a day  - Stand patient 3 times a day  - Ambulate patient 3 times a day  - Out of bed to chair 3 times a day   - Out of bed for meals 3 times a day  - Out of bed for toileting  - Record patient progress and toleration of activity level   Outcome: Progressing     Problem: PAIN - ADULT  Goal: Verbalizes/displays adequate comfort level or baseline comfort level  Description: Interventions:  - Encourage patient to monitor pain and request assistance  - Assess pain using appropriate pain scale  - Administer analgesics based on type and severity of pain and evaluate response  - Implement non-pharmacological measures as appropriate and evaluate response  - Consider cultural and social influences on pain and pain management  - Notify physician/advanced practitioner if interventions unsuccessful or patient reports new pain  Outcome: Progressing

## 2023-07-25 NOTE — CASE MANAGEMENT
Case Management Assessment & Discharge Planning Note    Patient name Rogelio Bloom  Location [facility]-* MRN [id number]  : 1929 Date 2023       Current Admission Date: 2023  Current Admission Diagnosis:Acute encephalopathy   Patient Active Problem List    Diagnosis Date Noted   • Hypertension 2023   • Hyperlipidemia 2023   • Acute encephalopathy 2023   • Depression with anxiety 2023   • CAD (coronary artery disease) 2023   • Elevated serum creatinine 2023   • Elevated liver enzymes 2023   • Abnormal CT scan 2023   • Compression fracture of vertebral column ([address]) 2023      LOS (days): 1  Geometric Mean LOS (GMLOS) (days): 3.60  Days to GMLOS:1.9     OBJECTIVE:    Risk of Unplanned Readmission Score: 9.04         Current admission status: Inpatient     Preferred Pharmacy: No Pharmacies Listed  Primary Care Provider: No primary care provider on file. Primary Insurance: [employer]  Secondary Insurance: [address]    ASSESSMENT:  [place] Proxies    There are no active Health Care Proxies on file.        Readmission Root Cause  30 Day Readmission: No    Patient Information  Admitted from[de-identified] Facility ([facility])  Mental Status: Alert  During Assessment patient was accompanied by: Daughter  Assessment information provided by[de-identified] Daughter  Primary Caregiver: Family  Caregiver's Name[de-identified] enrique sotomayor (son) & Amna Sotomayor(dtr)  Caregiver's Relationship to Patient[de-identified] Family Member  Caregiver's Telephone Number[de-identified] [phone]  Support Systems: DaughterJacqueline of Residence: [address] [place] [address] do you live in?: [place]  Type of Current Residence: 2 story home  Upon entering residence, is there a bedroom on the main floor (no further steps)?: No  A bedroom is located on the following floor levels of residence (select all that apply):: 2nd Floor  Upon entering residence, is there a bathroom on the main floor (no further steps)?: No  Indicate which floors of current residence have a bathroom (select all the apply):: 2nd Floor  Number of steps to 2nd floor from main floor: One Flight  In the last 12 months, was there a time when you were not able to pay the mortgage or rent on time?: No  In the last 12 months, how many places have you lived?: 1  In the last 12 months, was there a time when you did not have a steady place to sleep or slept in a shelter (including now)?: No  Homeless/housing insecurity resource given?: N/A  Living Arrangements: Lives w/ Daughter    Activities of Daily Living Prior to Admission  Functional Status: Assistance  Completes ADLs independently?: Yes  Ambulates independently?: Yes  Does patient use assisted devices?: No  Does patient currently own DME?: No  Does patient have a history of Outpatient Therapy (PT/OT)?: No  Does the patient have a history of Short-Term Rehab?: Yes ([facility])  Does patient have a history of HHC?: No  Does patient currently have [address] [address] [address]?: No     Patient Information Continued  Does patient have prescription coverage?: Yes  Within the past 12 months, you worried that your food would run out before you got the money to buy more.: Never true  Within the past 12 months, the food you bought just didn't last and you didn't have money to get more.: Never true  Food insecurity resource given?: N/A  Does patient receive dialysis treatments?: No    Means of Transportation  Means of Transport to Appts[de-identified] Family transport  In the past 12 months, has lack of transportation kept you from medical appointments or from getting medications?: No  In the past 12 months, has lack of transportation kept you from meetings, work, or from getting things needed for daily living?: No  Was application for public transport provided?: N/A    DISCHARGE DETAILS:    Discharge planning discussed with[de-identified] Vanessa Woo  Freedom of Choice: Yes  Comments - Freedom of Choice: STR preferences. Dtr voiced preference for [facility] but also aware that patient's insurance may not be accepted at [facility]  CM contacted family/caregiver?: Yes  Were Treatment Team discharge recommendations reviewed with patient/caregiver?: Yes  Did patient/caregiver verbalize understanding of patient care needs?: N/A- going to facility  Were patient/caregiver advised of the risks associated with not following Treatment Team discharge recommendations?: Yes    Contacts  Patient Contacts: Jerzy Charles (dtr)  Relationship to Patient[de-identified] Family  Contact Method: In Person  Reason/Outcome: Continuity of Care, Emergency Contact, Discharge [address]         Is the patient interested in [address] [facility] at discharge?: No    DME Referral Provided  Referral made for DME?: No    Other Referral/Resources/Interventions Provided:  Interventions: Short Term Rehab  Referral Comments: CM met with patient and dtr Amna at bedside, introduced self and role and to discuss discharge planning. Jerzy Charles stated that patient lives with her and son and they help caring for their father. He recently was at [facility] for STR and the plan is for patient to be discharged to home with [facility] after STR. Amna voiced preference for [facility] but aware that patient's insurance may not be accepted at [facility] and Jerzy Charles agreeable for CM to place referrals for STR at [facility] and other [facility] facilities as well. CM sent referral via [employer], awaiting response.      Treatment Team Recommendation: Short Term Rehab  Discharge Destination Plan[de-identified] Short Term Rehab  Transport at Discharge : [employer] [place]

## 2023-07-25 NOTE — SPEECH THERAPY NOTE
Speech/Language Pathology Progress Note    Patient Name: Tawana CAMARA Date: 7/25/2023     Problem List  Principal Problem:    Acute encephalopathy  Active Problems:    Hypertension    Hyperlipidemia    Depression with anxiety    CAD (coronary artery disease)    Elevated serum creatinine    Elevated liver enzymes    Abnormal CT scan    Compression fracture of vertebral column (HCC)    Subjective:  "Vickie George" when asked his name. Objective:  Pt was seen for diagnostic dysphagia therapy to assess readiness for oral diet. Pt roused with stimulation (verbal stim/introduction/imitating simple commands), upright positioning). Pt was assessed with ice chip, water by straw, pudding and tracy crackers bites in pudding. He refused applesauce, juices, eggs, coffee (requested Chris coffee). Upper dentures in place. Mastication was mildly prolonged and weak appearing buit effective with tracy crackers in pudding. .  Bolus formation and transfer were effective. Swallow initiation appeared prompt. Laryngeal rise/airway protection suspected to be adequate. No coughing, throat clearing, c/o stasis, multiple swallows, change in vocal quality noted c po intake today. Assessment:  MS significantly improved. Suspect min oral but no significant pharyngeal dysphagia. Plan/Recommendations:  Consider Level 3 dysphagia (soft cooked) diet with thin liquid. Pills as tolerated. SLP to continue diagnostic tx, assessing pt with full tray including more dense foods and advance diet to "regular" texture if tolerated.      Aleksey Lema [address] [facility] [phone]

## 2023-07-25 NOTE — PLAN OF CARE
Assessment:  MS significantly improved. Suspect min oral but no significant pharyngeal dysphagia. Plan/Recommendations:  Consider Level 3 dysphagia (soft cooked) diet with thin liquid. Pills as tolerated. SLP to continue diagnostic tx, assessing pt with full tray including more dense foods and advance diet to "regular" texture if tolerated.

## 2023-07-25 NOTE — ASSESSMENT & PLAN NOTE
80year old male with HTN, HLD, anxiety, depression, CAD who presents to the hospital with somnolence and encephalopathy after a fall at care facility. He was brought to the hospital for evaluation and per review of notes, patient had been started on Flexeril a few days prior to admission and symptoms began after that medication was started. He was noted to have mild elevation of creatinine and LFTs when he presented to the ED. Patient appears awake and alert today and is able to follow simple commands and tell me his name and also his daughter's name. Suspect encephalopathy in setting of Flexeril use, as well as possible elevation of creatinine (unclear baseline) and is now improving. MRI brain completed and demonstrates no acute intracranial abnormality. Atrophy and chronic microangiopathy noted. Plan:   - Given mentation is improving, no additional neurologic work-up recommended at this time.   - Recommend avoid sedating medications and would hold off on muscle relaxants at this time. - Vit B12 and Vit D pending.   - Will check folate. - Ammonia <10, UA unremarkable, TSH normal at 3.439.  - Recommend outpatient neurology follow-up for formal memory testing after acute hospitalization. Discussed findings of MRI brain as well as recommendation for outpatient neurology follow-up with patient's daughter via telephone and she expressed understanding.

## 2023-07-26 PROBLEM — R79.89 ELEVATED SERUM CREATININE: Status: RESOLVED | Noted: 2023-07-24 | Resolved: 2023-07-26

## 2023-07-26 LAB
ALBUMIN SERPL BCP-MCNC: 3.2 G/DL (ref 3.5–5)
ALP SERPL-CCNC: 47 U/L (ref 34–104)
ALT SERPL W P-5'-P-CCNC: 24 U/L (ref 7–52)
ANION GAP SERPL CALCULATED.3IONS-SCNC: 8 MMOL/L
AST SERPL W P-5'-P-CCNC: 35 U/L (ref 13–39)
BASOPHILS # BLD AUTO: 0.11 THOUSANDS/ÂΜL (ref 0–0.1)
BASOPHILS NFR BLD AUTO: 1 % (ref 0–1)
BILIRUB DIRECT SERPL-MCNC: 0.11 MG/DL (ref 0–0.2)
BILIRUB SERPL-MCNC: 0.68 MG/DL (ref 0.2–1)
BUN SERPL-MCNC: 20 MG/DL (ref 5–25)
CALCIUM SERPL-MCNC: 8.5 MG/DL (ref 8.4–10.2)
CHLORIDE SERPL-SCNC: 104 MMOL/L (ref 96–108)
CK SERPL-CCNC: 111 U/L (ref 39–308)
CO2 SERPL-SCNC: 23 MMOL/L (ref 21–32)
CREAT SERPL-MCNC: 1.03 MG/DL (ref 0.6–1.3)
EOSINOPHIL # BLD AUTO: 0.48 THOUSAND/ÂΜL (ref 0–0.61)
EOSINOPHIL NFR BLD AUTO: 5 % (ref 0–6)
ERYTHROCYTE [DISTWIDTH] IN BLOOD BY AUTOMATED COUNT: 13.9 % (ref 11.6–15.1)
GFR SERPL CREATININE-BSD FRML MDRD: 61 ML/MIN/1.73SQ M
GLUCOSE SERPL-MCNC: 102 MG/DL (ref 65–140)
HCT VFR BLD AUTO: 37.3 % (ref 36.5–49.3)
HGB BLD-MCNC: 11.8 G/DL (ref 12–17)
IMM GRANULOCYTES # BLD AUTO: 0.08 THOUSAND/UL (ref 0–0.2)
IMM GRANULOCYTES NFR BLD AUTO: 1 % (ref 0–2)
LYMPHOCYTES # BLD AUTO: 2.37 THOUSANDS/ÂΜL (ref 0.6–4.47)
LYMPHOCYTES NFR BLD AUTO: 27 % (ref 14–44)
MCH RBC QN AUTO: 31.6 PG (ref 26.8–34.3)
MCHC RBC AUTO-ENTMCNC: 31.6 G/DL (ref 31.4–37.4)
MCV RBC AUTO: 100 FL (ref 82–98)
MONOCYTES # BLD AUTO: 1 THOUSAND/ÂΜL (ref 0.17–1.22)
MONOCYTES NFR BLD AUTO: 11 % (ref 4–12)
MRSA NOSE QL CULT: NORMAL
NEUTROPHILS # BLD AUTO: 4.78 THOUSANDS/ÂΜL (ref 1.85–7.62)
NEUTS SEG NFR BLD AUTO: 55 % (ref 43–75)
NRBC BLD AUTO-RTO: 0 /100 WBCS
PLATELET # BLD AUTO: 241 THOUSANDS/UL (ref 149–390)
PMV BLD AUTO: 12.1 FL (ref 8.9–12.7)
POTASSIUM SERPL-SCNC: 4.2 MMOL/L (ref 3.5–5.3)
PROCALCITONIN SERPL-MCNC: 0.05 NG/ML
PROT SERPL-MCNC: 6.2 G/DL (ref 6.4–8.4)
RBC # BLD AUTO: 3.73 MILLION/UL (ref 3.88–5.62)
SODIUM SERPL-SCNC: 135 MMOL/L (ref 135–147)
WBC # BLD AUTO: 8.82 THOUSAND/UL (ref 4.31–10.16)

## 2023-07-26 PROCEDURE — 97530 THERAPEUTIC ACTIVITIES: CPT

## 2023-07-26 PROCEDURE — 84145 PROCALCITONIN (PCT): CPT | Performed by: INTERNAL MEDICINE

## 2023-07-26 PROCEDURE — 82550 ASSAY OF CK (CPK): CPT | Performed by: INTERNAL MEDICINE

## 2023-07-26 PROCEDURE — 80076 HEPATIC FUNCTION PANEL: CPT | Performed by: INTERNAL MEDICINE

## 2023-07-26 PROCEDURE — 99232 SBSQ HOSP IP/OBS MODERATE 35: CPT | Performed by: INTERNAL MEDICINE

## 2023-07-26 PROCEDURE — 80048 BASIC METABOLIC PNL TOTAL CA: CPT | Performed by: STUDENT IN AN ORGANIZED HEALTH CARE EDUCATION/TRAINING PROGRAM

## 2023-07-26 PROCEDURE — 97535 SELF CARE MNGMENT TRAINING: CPT

## 2023-07-26 PROCEDURE — 85025 COMPLETE CBC W/AUTO DIFF WBC: CPT | Performed by: STUDENT IN AN ORGANIZED HEALTH CARE EDUCATION/TRAINING PROGRAM

## 2023-07-26 RX ORDER — FAMOTIDINE 20 MG/1
20 TABLET, FILM COATED ORAL DAILY
Status: DISCONTINUED | OUTPATIENT
Start: 2023-07-27 | End: 2023-07-31 | Stop reason: HOSPADM

## 2023-07-26 RX ORDER — LIDOCAINE 50 MG/G
1 PATCH TOPICAL DAILY
Status: DISCONTINUED | OUTPATIENT
Start: 2023-07-26 | End: 2023-07-31 | Stop reason: HOSPADM

## 2023-07-26 RX ADMIN — OXYBUTYNIN CHLORIDE 5 MG: 5 TABLET ORAL at 21:31

## 2023-07-26 RX ADMIN — ACETAMINOPHEN 650 MG: 325 TABLET ORAL at 17:25

## 2023-07-26 RX ADMIN — ACETAMINOPHEN 650 MG: 325 TABLET ORAL at 23:16

## 2023-07-26 RX ADMIN — FAMOTIDINE 20 MG: 10 INJECTION, SOLUTION INTRAVENOUS at 12:23

## 2023-07-26 RX ADMIN — ENOXAPARIN SODIUM 30 MG: 30 INJECTION SUBCUTANEOUS at 10:28

## 2023-07-26 RX ADMIN — POLYETHYLENE GLYCOL 3350 17 G: 17 POWDER, FOR SOLUTION ORAL at 10:30

## 2023-07-26 RX ADMIN — CEFTRIAXONE 1000 MG: 1 INJECTION, SOLUTION INTRAVENOUS at 02:49

## 2023-07-26 RX ADMIN — OXYBUTYNIN CHLORIDE 5 MG: 5 TABLET ORAL at 10:29

## 2023-07-26 RX ADMIN — ATORVASTATIN CALCIUM 5 MG: 10 TABLET, FILM COATED ORAL at 10:28

## 2023-07-26 RX ADMIN — ACETAMINOPHEN 650 MG: 325 TABLET ORAL at 12:26

## 2023-07-26 RX ADMIN — LOSARTAN POTASSIUM 75 MG: 50 TABLET, FILM COATED ORAL at 10:29

## 2023-07-26 RX ADMIN — OXYBUTYNIN CHLORIDE 5 MG: 5 TABLET ORAL at 17:25

## 2023-07-26 RX ADMIN — LIDOCAINE 1 PATCH: 700 PATCH TOPICAL at 10:28

## 2023-07-26 RX ADMIN — PRAZOSIN HYDROCHLORIDE 1 MG: 1 CAPSULE ORAL at 21:31

## 2023-07-26 RX ADMIN — ACETAMINOPHEN 650 MG: 325 TABLET ORAL at 05:37

## 2023-07-26 NOTE — CASE MANAGEMENT
Case Management Discharge Planning Note    Patient name Audrey Gomez  Location [address] [facility]-* MRN [id number]  : 1929 Date 2023       Current Admission Date: 2023  Current Admission Diagnosis:Acute encephalopathy   Patient Active Problem List    Diagnosis Date Noted   • Hypertension 2023   • Hyperlipidemia 2023   • Acute encephalopathy 2023   • Depression with anxiety 2023   • CAD (coronary artery disease) 2023   • Elevated liver enzymes 2023   • Abnormal CT scan 2023   • Compression fracture of vertebral column ([address]) 2023      LOS (days): 2  Geometric Mean LOS (GMLOS) (days): 3.60  Days to GMLOS:1     OBJECTIVE:  Risk of Unplanned Readmission Score: 10.33         Current admission status: Inpatient   Preferred Pharmacy: No Pharmacies Listed  Primary Care Provider: No primary care provider on file. Primary Insurance: [address]: [address]    DISCHARGE DETAILS:    Other Referral/Resources/Interventions Provided:  Referral Comments: CM reached out to [facility] per dtr Amna's request and spoke with admissions lialuciano Meza who reviewed chart and accepted patient. CM met with dtr Mook Smith in patient's room to inform that [facility] accepted patient and if she is still agreeable with [facility] then CM can get the insurance process initiated. Amna alexandra with [facility]. CM reserved [facility] via [employer] and tasked CMDS for [employer] with [facility] date of .      Treatment Team Recommendation: Short Term Rehab  Discharge Destination Plan[de-identified] Short Term Rehab [facility])  Transport at Discharge :  [address], [address] [facility]

## 2023-07-26 NOTE — PROGRESS NOTES
[facility] Progress Note  Patient: Cynthia Barrientos 80 y.o. male   MRN: [id number]  PCP: No primary care provider on file. Unit/Bed#: [address] [place] [address] Encounter: [id number]  Date Of Visit: 07/26/23    Problem List:    Principal Problem:    Acute encephalopathy  Active Problems:    Hypertension    CAD (coronary artery disease)    Elevated liver enzymes    Abnormal CT scan    Compression fracture of vertebral column (HCC)    Hyperlipidemia    Depression with anxiety      Assessment & Plan:    * Acute encephalopathy  Assessment & Plan  Patient presents with lethargy started on Sunday afternoon from NH per staff. Pt was found under his wheel chair on Saturday night,unwitnessed. Patient was started on Flexeril 5mg po TID prn on Saturday after the fall,taken 6 doses total so far. · Toxic Encephalopathy likely due to newly started Flexeril, evidenced by lethargy, treated with CT head, frequent neuro checks, IVF, UA and lab monitoring. · CTH, C spine no acute findings  · CT C/A/P showed -  No acute traumatic injury. Bibasilar dependent atelectasis and scattered lower lobe predominant hazy pulmonary infiltrates noted which could be related to aspiration. Trace right pleural effusion. · UA clean  · Ammonia normal  · ABG shows mild hypoxia  · WBC normal,no bands. Afebrile. · Was on oxygen 3L initially. Does not use O2 at baseline. Now satting mid 90s on room air  · B12, folate, TSH, vitamin D unremarkable  · Unable to obtain levels of Flexeril  · MRI of brain showed no signs of ischemia  Mental status improving. Remains afebrile without signs and symptoms of infection. Oxygen requirement has improved. Procalcitonin negative  · Discontinue antibiotics, aspiration precaution  · Speech pathology recommends stage III dysphagia diet  · PT OT  · Neurology consulted, no additional work-up recommended at this time. Agree altered mentation likely due to Flexeril.     · Avoid sedating medications. 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 · Tylenol, lidocaine patch. · Monitor for back pain    Abnormal CT scan  Assessment & Plan  · CT:  Bibasilar dependent atelectasis and scattered lower lobe predominant hazy pulmonary infiltrates noted which could be related to aspiration  · Was prescribed Avelox 400mg po daily for 6 days for infection,took 3 doses so far. Staff not sure for what infection. Will hold. · Discontinue IV antibiotics  · Procal-WNL, follow-up with  · ST eval -- okay to start Level 3 dysphagia diet    Elevated liver enzymes  Assessment & Plan  · Mildly elevated on admission  · AST 54  · Trend LFT    CAD (coronary artery disease)  Assessment & Plan  · Continue Lipitor    Hypertension  Assessment & Plan  On Losartan, Minipress at home  Held Losartan on admission due to elevated Cr initially  Blood pressure trend is improving  · Continue Minipress  · Resumed losartan  · Hydralazine prn        Elevated serum creatinine-resolved as of 7/26/2023  Assessment & Plan  · Cr 1.51 >1.35 > 1.11  · Unclear baseline  · Resolving with gentle IVF  · Continue to trend    Depression with anxiety  Assessment & Plan  · Hold home zoloft at present    Hyperlipidemia  Assessment & Plan  · Continue statin          VTE Pharmacologic Prophylaxis:   Moderate Risk (Score 3-4) - Pharmacological DVT Prophylaxis Ordered: enoxaparin (Lovenox). Patient Centered Rounds: I performed bedside rounds with nursing staff today. Discussions with Specialists or Other Care Team Provider: yes    Education and Discussions with Family / Patient: Updated  (daughter) via phone. Time Spent for Care: 45 minutes.  More than 50% of total time spent on counseling and coordination of care as described above. Current Length of Stay: 2 day(s)  Current Patient Status: Inpatient   Certification Statement: The patient will continue to require additional inpatient hospital stay due to Monitoring of mental status  Discharge Plan: Anticipate discharge in 24-48 hrs to rehab facility. Code Status: Level 1 - Full Code    Subjective:   Patient seen at bedside, comfortably sleeping. No acute distress. Chris  utilized. Denies any pain, following commands. Moving all extremities spontaneously. Able to raise lower extremities against gravity without any discomfort or worsening of pain    Worked with physical therapy requiring significant assistance but did not report increasing pain with PT        Objective:     Vitals:   Temp (24hrs), Av °F (36.7 °C), Min:97.4 °F (36.3 °C), Max:98.7 °F (37.1 °C)    Temp:  [97.4 °F (36.3 °C)-98.7 °F (37.1 °C)] 97.9 °F (36.6 °C)  HR:  [74-81] 74  Resp:  [18] 18  BP: (165-171)/(73-90) 165/90  SpO2:  [93 %-98 %] 93 %  Body mass index is 26.11 kg/m². Input and Output Summary (last 24 hours):   No intake or output data in the 24 hours ending 23 0913    Physical Exam:   Physical Exam  Constitutional:       General: He is not in acute distress. HENT:      Head: Normocephalic and atraumatic. Cardiovascular:      Rate and Rhythm: Normal rate. Pulmonary:      Effort: Pulmonary effort is normal. No respiratory distress. Breath sounds: No wheezing, rhonchi or rales. Chest:      Chest wall: No tenderness. Abdominal:      General: Bowel sounds are normal. There is no distension. Palpations: Abdomen is soft. Tenderness: There is no abdominal tenderness. There is no guarding or rebound. Musculoskeletal:      Cervical back: Neck supple. Skin:     General: Skin is warm and dry. Findings: No rash. Neurological:      Mental Status: He is alert. Mental status is at baseline.       Cranial Nerves: No cranial nerve deficit. Psychiatric:         Cognition and Memory: Cognition is impaired.          Additional Data:     Labs:  Results from last 7 days   Lab Units 07/26/23  0405   WBC Thousand/uL 8.82   HEMOGLOBIN g/dL 11.8*   HEMATOCRIT % 37.3   PLATELETS Thousands/uL 241   NEUTROS PCT % 55   LYMPHS PCT % 27   MONOS PCT % 11   EOS PCT % 5     Results from last 7 days   Lab Units 07/26/23  0405   SODIUM mmol/L 135   POTASSIUM mmol/L 4.2   CHLORIDE mmol/L 104   CO2 mmol/L 23   BUN mg/dL 20   CREATININE mg/dL 1.03   ANION GAP mmol/L 8   CALCIUM mg/dL 8.5   ALBUMIN g/dL 3.2*   TOTAL BILIRUBIN mg/dL 0.68   ALK PHOS U/L 47   ALT U/L 24   AST U/L 35   GLUCOSE RANDOM mg/dL 102         Results from last 7 days   Lab Units 07/23/23  2008   POC GLUCOSE mg/dl 158*         Results from last 7 days   Lab Units 07/26/23  0405 07/24/23  0619   PROCALCITONIN ng/ml 0.05 0.08       Lines/Drains:  Invasive Devices     Peripheral Intravenous Line  Duration           Peripheral IV 07/23/23 Right Antecubital 2 days          Drain  Duration           External Urinary Catheter Medium 2 days                      Imaging: Reviewed radiology reports from this admission including: chest CT scan and MRI brain    Recent Cultures (last 7 days):         Last 24 Hours Medication List:   Current Facility-Administered Medications   Medication Dose Route Frequency Provider Last Rate   • acetaminophen  325 mg Oral Q6H PRN Damien Azevedo MD     • acetaminophen  650 mg Oral Q6H [facility] Damien Azevedo MD     • atorvastatin  5 mg Oral Daily LIZA Arnold     • cefTRIAXone  1,000 mg Intravenous Q24H LIZA Arnold 1,000 mg (07/26/23 0249)   • enoxaparin  30 mg Subcutaneous Daily LIZA Arnold     • famotidine  20 mg Intravenous Q24H [facility] Leanna Phan MD     • hydrALAZINE  5 mg Intravenous Q4H PRN LIZA Arnold     • lidocaine  1 patch Topical Daily Damien Azevedo MD     • losartan  75 mg Oral Daily Damien Azevedo MD     • oxybutynin  5 mg Oral TID LIZA Arnold     • polyethylene glycol  17 g Oral Daily LIZA Arnold     • prazosin  1 mg Oral HS LIZA Arnold          Today, Patient Was Seen By: De Bishop MD    ** Please Note: "This note has been constructed using a voice recognition system. Therefore there may be syntax, spelling, and/or grammatical errors.  Please call if you have any questions. "**

## 2023-07-26 NOTE — PLAN OF CARE
Problem: Potential for Falls  Goal: Patient will remain free of falls  Description: INTERVENTIONS:  - Educate patient/family on patient safety including physical limitations  - Instruct patient to call for assistance with activity   - Consult OT/PT to assist with strengthening/mobility   - Keep Call bell within reach  - Keep bed low and locked with side rails adjusted as appropriate  - Keep care items and personal belongings within reach  - Initiate and maintain comfort rounds  - Make Fall Risk Sign visible to staff  - Offer Toileting every 2  Hours, in advance of need  - Initiate/Maintain bed/chair alarm  - Obtain necessary fall risk management equipment: walker  - Apply yellow socks and bracelet for high fall risk patients  - Consider moving patient to room near nurses station  Outcome: Progressing     Problem: Prexisting or High Potential for Compromised Skin Integrity  Goal: Skin integrity is maintained or improved  Description: INTERVENTIONS:  - Identify patients at risk for skin breakdown  - Assess and monitor skin integrity  - Assess and monitor nutrition and hydration status  - Monitor labs   - Assess for incontinence   - Turn and reposition patient  - Assist with mobility/ambulation  - Relieve pressure over bony prominences  - Avoid friction and shearing  - Provide appropriate hygiene as needed including keeping skin clean and dry  - Evaluate need for skin moisturizer/barrier cream  - Collaborate with interdisciplinary team   - Patient/family teaching  - Consider wound care consult   Outcome: Progressing     Problem: MOBILITY - ADULT  Goal: Maintain or return to baseline ADL function  Description: INTERVENTIONS:  -  Assess patient's ability to carry out ADLs; assess patient's baseline for ADL function and identify physical deficits which impact ability to perform ADLs (bathing, care of mouth/teeth, toileting, grooming, dressing, etc.)  - Assess/evaluate cause of self-care deficits   - Assess range of motion  - Assess patient's mobility; develop plan if impaired  - Assess patient's need for assistive devices and provide as appropriate  - Encourage maximum independence but intervene and supervise when necessary  - Involve family in performance of ADLs  - Assess for home care needs following discharge   - Consider OT consult to assist with ADL evaluation and planning for discharge  - Provide patient education as appropriate  Outcome: Progressing  Goal: Maintains/Returns to pre admission functional level  Description: INTERVENTIONS:  - Perform BMAT or MOVE assessment daily.   - Set and communicate daily mobility goal to care team and patient/family/caregiver. - Collaborate with rehabilitation services on mobility goals if consulted  - Perform Range of Motion 3  times a day. - Reposition patient every 2 hours.   - Dangle patient 2  times a day  - Stand patient 2 times a day  - Ambulate patient 2  times a day  - Out of bed to chair 2  times a day   - Out of bed for meals 3 times a day  - Out of bed for toileting  - Record patient progress and toleration of activity level   Outcome: Progressing     Problem: PAIN - ADULT  Goal: Verbalizes/displays adequate comfort level or baseline comfort level  Description: Interventions:  - Encourage patient to monitor pain and request assistance  - Assess pain using appropriate pain scale  - Administer analgesics based on type and severity of pain and evaluate response  - Implement non-pharmacological measures as appropriate and evaluate response  - Consider cultural and social influences on pain and pain management  - Notify physician/advanced practitioner if interventions unsuccessful or patient reports new pain  Outcome: Progressing     Problem: INFECTION - ADULT  Goal: Absence or prevention of progression during hospitalization  Description: INTERVENTIONS:  - Assess and monitor for signs and symptoms of infection  - Monitor lab/diagnostic results  - Monitor all insertion sites, i.e. indwelling lines, tubes, and drains  - Monitor endotracheal if appropriate and nasal secretions for changes in amount and color  - [place] appropriate cooling/warming therapies per order  - Administer medications as ordered  - Instruct and encourage patient and family to use good hand hygiene technique  - Identify and instruct in appropriate isolation precautions for identified infection/condition  Outcome: Progressing  Goal: Absence of fever/infection during neutropenic period  Description: INTERVENTIONS:  - Monitor WBC    Outcome: Progressing     Problem: SAFETY ADULT  Goal: Patient will remain free of falls  Description: INTERVENTIONS:  - Educate patient/family on patient safety including physical limitations  - Instruct patient to call for assistance with activity   - Consult OT/PT to assist with strengthening/mobility   - Keep Call bell within reach  - Keep bed low and locked with side rails adjusted as appropriate  - Keep care items and personal belongings within reach  - Initiate and maintain comfort rounds  - Make Fall Risk Sign visible to staff  - Offer Toileting every 2  Hours, in advance of need  - Initiate/Maintain  bed/chair alarm  - Obtain necessary fall risk management equipment: walker  - Apply yellow socks and bracelet for high fall risk patients  - Consider moving patient to room near nurses station  Outcome: Progressing  Goal: Maintain or return to baseline ADL function  Description: INTERVENTIONS:  -  Assess patient's ability to carry out ADLs; assess patient's baseline for ADL function and identify physical deficits which impact ability to perform ADLs (bathing, care of mouth/teeth, toileting, grooming, dressing, etc.)  - Assess/evaluate cause of self-care deficits   - Assess range of motion  - Assess patient's mobility; develop plan if impaired  - Assess patient's need for assistive devices and provide as appropriate  - Encourage maximum independence but intervene and supervise when necessary  - Involve family in performance of ADLs  - Assess for home care needs following discharge   - Consider OT consult to assist with ADL evaluation and planning for discharge  - Provide patient education as appropriate  Outcome: Progressing  Goal: Maintains/Returns to pre admission functional level  Description: INTERVENTIONS:  - Perform BMAT or MOVE assessment daily.   - Set and communicate daily mobility goal to care team and patient/family/caregiver. - Collaborate with rehabilitation services on mobility goals if consulted  - Perform Range of Motion 2 times a day. - Reposition patient every 2  hours. - Dangle patient 2 times a day  - Stand patient 2  times a day  - Ambulate patient 2 times a day  - Out of bed to chair 2  times a day   - Out of bed for meals 3 times a day  - Out of bed for toileting  - Record patient progress and toleration of activity level   Outcome: Progressing     Problem: DISCHARGE PLANNING  Goal: Discharge to home or other facility with appropriate resources  Description: INTERVENTIONS:  - Identify barriers to discharge w/patient and caregiver  - Arrange for needed discharge resources and transportation as appropriate  - Identify discharge learning needs (meds, wound care, etc.)  - Arrange for interpretive services to assist at discharge as needed  - Refer to Case Management Department for coordinating discharge planning if the patient needs post-hospital services based on physician/advanced practitioner order or complex needs related to functional status, cognitive ability, or social support system  Outcome: Progressing     Problem: Knowledge Deficit  Goal: Patient/family/caregiver demonstrates understanding of disease process, treatment plan, medications, and discharge instructions  Description: Complete learning assessment and assess knowledge base.   Interventions:  - Provide teaching at level of understanding  - Provide teaching via preferred learning methods  Outcome: Progressing

## 2023-07-26 NOTE — CASE MANAGEMENT
[facility] received request for authorization from Care Manager. Authorization request for: SNF  Facility Name: [facility] NPI # [id number]  Facility MD: Dr. Fermin Gregorio NPI #[id number]  Authorization initiated by contacting insurance: TOMMY Via: Phone.  [phone]  Pending Reference #: [id number]  Clinicals submitted via: [phone]

## 2023-07-26 NOTE — PHYSICAL THERAPY NOTE
PT TREATMENT       07/26/23 1205   PT Last Visit   PT Visit Date 07/26/23   Note Type   Note Type Treatment   Pain Assessment   Pain Assessment Tool FLACC   Pain Score No Pain   Hooper-Baker FACES Pain Rating 0   Pain Rating: FLACC (Rest) - Face 0   Pain Rating: FLACC (Rest) - Legs 0   Pain Rating: FLACC (Rest) - Activity 0   Pain Rating: FLACC (Rest) - Cry 0   Pain Rating: FLACC (Rest) - Consolability 0   Score: FLACC (Rest) 0   Pain Rating: FLACC (Activity) - Face 0   Pain Rating: FLACC (Activity) - Legs 0   Pain Rating: FLACC (Activity) - Activity 0   Pain Rating: FLACC (Activity) - Cry 0   Pain Rating: FLACC (Activity) - Consolability 0   Score: FLACC (Activity) 0   Restrictions/Precautions   Weight Bearing Precautions Per Order No   Other Precautions Bed Alarm; Chair Alarm; Fall Risk;Pain;Cognitive   General   Chart Reviewed Yes   Family/Caregiver Present No   Cognition   Overall Cognitive Status Impaired   Arousal/Participation Responsive   Attention Difficulty attending to directions   Orientation Level Oriented to person;Disoriented to place; Disoriented to time;Disoriented to situation   Following Commands Follows one step commands inconsistently   Subjective   Subjective Pt reports no pain throughout session  (Chris  used via ipad throughout session)   Bed Mobility   Rolling L 4  Minimal assistance   Additional items Assist x 1;Verbal cues; Bedrails   Supine to Sit 2  Maximal assistance   Additional items Assist x 2   Sit to Supine 2  Maximal assistance   Additional items Assist x 2   Transfers   Sit to Stand 2  Maximal assistance   Additional items Assist x 2   Stand to Sit 2  Maximal assistance   Additional items Assist x 2   Stand pivot Unable to assess  (Due to poor standing balance, decreased safety awareness and unable to transfer to chair)   Ambulation/Elevation   Gait Assistance Not tested   Balance   Static Sitting Poor   Dynamic Sitting Poor -   Static Standing Poor -   Dynamic Standing Poor -   Activity Tolerance   Activity Tolerance Patient limited by fatigue   Nurse Made Aware yes   Assessment   Prognosis Good   Problem List Decreased range of motion;Decreased endurance; Impaired balance;Decreased coordination;Decreased mobility; Decreased cognition; Impaired judgement;Decreased safety awareness   Assessment Pt seen for PT session this AM - partial session was [place] with OT due to requirement of 2 persons for bed mobility/transfers. Continues to require Max A x 2 for bed mobility + STS transfers. Attempted STS x 3-4 attempts with poor standing balance/decrease safety awareness and unable to stand for longer than 5-10 seconds. Pt with intermittent LOB in sitting, most notable R lateral lean - requires Min-Max A to maintain. Repositioned in supine with all needs within reach. The patient's AM-PAC Basic Mobility Inpatient Short Form Raw Score is 7. A Raw score of less than or equal to 16 suggests the patient may benefit from discharge to post-acute rehabilitation services. Please also refer to the recommendation of the Physical Therapist for safe discharge planning. Goals   Patient Goals unable to state due to cognition   Plan   Treatment/Interventions ADL retraining;Functional transfer training;LE strengthening/ROM; Therapeutic exercise; Endurance training;Bed mobility;Gait training;Spoke to nursing;OT   Progress Slow progress, decreased activity tolerance   PT Frequency Other (Comment)  (5x/week)   Recommendation   PT Discharge Recommendation Post acute rehabilitation services   AM-PAC Basic Mobility Inpatient   Turning in Flat Bed Without Bedrails 2   Lying on Back to Sitting on Edge of Flat Bed Without Bedrails 1   Moving Bed to Chair 1   Standing Up From Chair Using Arms 1   Walk in Room 1   Climb 3-5 Stairs With Railing 1   Basic Mobility Inpatient Raw Score 7   Turning Head Towards Sound 3   Follow Simple Instructions 2   Low Function Basic Mobility Raw Score  12   Low Function Basic Mobility Standardized Score  18.33   Highest Level Of Mobility   -[facility] Goal 2: Bed activities/Dependent transfer   -[facility] Achieved 3: Sit at edge of bed   End of Consult   Patient Position at End of Consult Supine;Bed/Chair alarm activated; All needs within reach   [facility] Insurance Number  Meghan [place] [id number]

## 2023-07-27 ENCOUNTER — APPOINTMENT (INPATIENT)
Dept: RADIOLOGY | Facility: HOSPITAL | Age: 88
DRG: 177 | End: 2023-07-27
Payer: COMMERCIAL

## 2023-07-27 PROBLEM — R33.9 URINARY RETENTION: Status: ACTIVE | Noted: 2023-07-27

## 2023-07-27 LAB
ANION GAP SERPL CALCULATED.3IONS-SCNC: 7 MMOL/L
BACTERIA UR QL AUTO: ABNORMAL /HPF
BASOPHILS # BLD AUTO: 0.11 THOUSANDS/ÂΜL (ref 0–0.1)
BASOPHILS NFR BLD AUTO: 1 % (ref 0–1)
BILIRUB UR QL STRIP: NEGATIVE
BUN SERPL-MCNC: 21 MG/DL (ref 5–25)
CALCIUM SERPL-MCNC: 8.6 MG/DL (ref 8.4–10.2)
CHLORIDE SERPL-SCNC: 105 MMOL/L (ref 96–108)
CLARITY UR: CLEAR
CO2 SERPL-SCNC: 25 MMOL/L (ref 21–32)
COLOR UR: YELLOW
CREAT SERPL-MCNC: 1.07 MG/DL (ref 0.6–1.3)
EOSINOPHIL # BLD AUTO: 0.41 THOUSAND/ÂΜL (ref 0–0.61)
EOSINOPHIL NFR BLD AUTO: 5 % (ref 0–6)
ERYTHROCYTE [DISTWIDTH] IN BLOOD BY AUTOMATED COUNT: 13.9 % (ref 11.6–15.1)
GFR SERPL CREATININE-BSD FRML MDRD: 59 ML/MIN/1.73SQ M
GLUCOSE SERPL-MCNC: 101 MG/DL (ref 65–140)
GLUCOSE UR STRIP-MCNC: NEGATIVE MG/DL
HCT VFR BLD AUTO: 35.2 % (ref 36.5–49.3)
HGB BLD-MCNC: 11.2 G/DL (ref 12–17)
HGB UR QL STRIP.AUTO: NEGATIVE
IMM GRANULOCYTES # BLD AUTO: 0.04 THOUSAND/UL (ref 0–0.2)
IMM GRANULOCYTES NFR BLD AUTO: 1 % (ref 0–2)
KETONES UR STRIP-MCNC: NEGATIVE MG/DL
LEUKOCYTE ESTERASE UR QL STRIP: ABNORMAL
LYMPHOCYTES # BLD AUTO: 2.68 THOUSANDS/ÂΜL (ref 0.6–4.47)
LYMPHOCYTES NFR BLD AUTO: 33 % (ref 14–44)
MCH RBC QN AUTO: 31.3 PG (ref 26.8–34.3)
MCHC RBC AUTO-ENTMCNC: 31.8 G/DL (ref 31.4–37.4)
MCV RBC AUTO: 98 FL (ref 82–98)
MONOCYTES # BLD AUTO: 1.07 THOUSAND/ÂΜL (ref 0.17–1.22)
MONOCYTES NFR BLD AUTO: 13 % (ref 4–12)
NEUTROPHILS # BLD AUTO: 3.93 THOUSANDS/ÂΜL (ref 1.85–7.62)
NEUTS SEG NFR BLD AUTO: 47 % (ref 43–75)
NITRITE UR QL STRIP: NEGATIVE
NON-SQ EPI CELLS URNS QL MICRO: ABNORMAL /HPF
NRBC BLD AUTO-RTO: 0 /100 WBCS
PH UR STRIP.AUTO: 6 [PH]
PLATELET # BLD AUTO: 242 THOUSANDS/UL (ref 149–390)
PMV BLD AUTO: 12 FL (ref 8.9–12.7)
POTASSIUM SERPL-SCNC: 4 MMOL/L (ref 3.5–5.3)
PROT UR STRIP-MCNC: NEGATIVE MG/DL
RBC # BLD AUTO: 3.58 MILLION/UL (ref 3.88–5.62)
RBC #/AREA URNS AUTO: ABNORMAL /HPF
SODIUM SERPL-SCNC: 137 MMOL/L (ref 135–147)
SP GR UR STRIP.AUTO: 1.01 (ref 1–1.03)
UROBILINOGEN UR QL STRIP.AUTO: 0.2 E.U./DL
WBC # BLD AUTO: 8.24 THOUSAND/UL (ref 4.31–10.16)
WBC #/AREA URNS AUTO: ABNORMAL /HPF

## 2023-07-27 PROCEDURE — 99232 SBSQ HOSP IP/OBS MODERATE 35: CPT | Performed by: INTERNAL MEDICINE

## 2023-07-27 PROCEDURE — 81001 URINALYSIS AUTO W/SCOPE: CPT | Performed by: INTERNAL MEDICINE

## 2023-07-27 PROCEDURE — 92526 ORAL FUNCTION THERAPY: CPT

## 2023-07-27 PROCEDURE — 97110 THERAPEUTIC EXERCISES: CPT

## 2023-07-27 PROCEDURE — 85025 COMPLETE CBC W/AUTO DIFF WBC: CPT | Performed by: STUDENT IN AN ORGANIZED HEALTH CARE EDUCATION/TRAINING PROGRAM

## 2023-07-27 PROCEDURE — 97530 THERAPEUTIC ACTIVITIES: CPT

## 2023-07-27 PROCEDURE — 80048 BASIC METABOLIC PNL TOTAL CA: CPT | Performed by: STUDENT IN AN ORGANIZED HEALTH CARE EDUCATION/TRAINING PROGRAM

## 2023-07-27 PROCEDURE — 87086 URINE CULTURE/COLONY COUNT: CPT | Performed by: INTERNAL MEDICINE

## 2023-07-27 PROCEDURE — 74018 RADEX ABDOMEN 1 VIEW: CPT

## 2023-07-27 RX ORDER — HALOPERIDOL 1 MG/1
2 TABLET ORAL ONCE
Status: COMPLETED | OUTPATIENT
Start: 2023-07-27 | End: 2023-07-27

## 2023-07-27 RX ORDER — LANOLIN ALCOHOL/MO/W.PET/CERES
3 CREAM (GRAM) TOPICAL
Status: DISCONTINUED | OUTPATIENT
Start: 2023-07-27 | End: 2023-07-31 | Stop reason: HOSPADM

## 2023-07-27 RX ORDER — SODIUM CHLORIDE, SODIUM GLUCONATE, SODIUM ACETATE, POTASSIUM CHLORIDE, MAGNESIUM CHLORIDE, SODIUM PHOSPHATE, DIBASIC, AND POTASSIUM PHOSPHATE .53; .5; .37; .037; .03; .012; .00082 G/100ML; G/100ML; G/100ML; G/100ML; G/100ML; G/100ML; G/100ML
50 INJECTION, SOLUTION INTRAVENOUS CONTINUOUS
Status: DISCONTINUED | OUTPATIENT
Start: 2023-07-27 | End: 2023-07-28

## 2023-07-27 RX ADMIN — PRAZOSIN HYDROCHLORIDE 1 MG: 1 CAPSULE ORAL at 21:34

## 2023-07-27 RX ADMIN — OXYBUTYNIN CHLORIDE 5 MG: 5 TABLET ORAL at 09:05

## 2023-07-27 RX ADMIN — ACETAMINOPHEN 650 MG: 325 TABLET ORAL at 23:32

## 2023-07-27 RX ADMIN — ATORVASTATIN CALCIUM 5 MG: 10 TABLET, FILM COATED ORAL at 09:05

## 2023-07-27 RX ADMIN — SODIUM CHLORIDE, SODIUM GLUCONATE, SODIUM ACETATE, POTASSIUM CHLORIDE, MAGNESIUM CHLORIDE, SODIUM PHOSPHATE, DIBASIC, AND POTASSIUM PHOSPHATE 50 ML/HR: .53; .5; .37; .037; .03; .012; .00082 INJECTION, SOLUTION INTRAVENOUS at 15:30

## 2023-07-27 RX ADMIN — LOSARTAN POTASSIUM 75 MG: 50 TABLET, FILM COATED ORAL at 09:05

## 2023-07-27 RX ADMIN — ENOXAPARIN SODIUM 30 MG: 30 INJECTION SUBCUTANEOUS at 09:05

## 2023-07-27 RX ADMIN — OXYBUTYNIN CHLORIDE 5 MG: 5 TABLET ORAL at 20:31

## 2023-07-27 RX ADMIN — ACETAMINOPHEN 650 MG: 325 TABLET ORAL at 17:57

## 2023-07-27 RX ADMIN — Medication 3 MG: at 21:33

## 2023-07-27 RX ADMIN — HALOPERIDOL 2 MG: 1 TABLET ORAL at 01:12

## 2023-07-27 RX ADMIN — FAMOTIDINE 20 MG: 20 TABLET ORAL at 09:05

## 2023-07-27 RX ADMIN — ACETAMINOPHEN 650 MG: 325 TABLET ORAL at 05:49

## 2023-07-27 RX ADMIN — LIDOCAINE 1 PATCH: 700 PATCH TOPICAL at 09:05

## 2023-07-27 RX ADMIN — POLYETHYLENE GLYCOL 3350 17 G: 17 POWDER, FOR SOLUTION ORAL at 09:05

## 2023-07-27 RX ADMIN — OXYBUTYNIN CHLORIDE 5 MG: 5 TABLET ORAL at 17:57

## 2023-07-27 NOTE — CASE MANAGEMENT
[facility] has received approved authorization from insurance: Shazia Diamond in by Rep: Celia P#  [phone]  Authorization received for: SNF  Facility: [facility] #: [id number]  Start of Care: 07/27/2023  Next Review Date: 07/31/2023  Continued Stay Care Coordinator: Remington Lorenzana P#: [phone]  Submit next review to: Fax. [phone]  Care Manager notified: Mallika Stewart

## 2023-07-27 NOTE — PHYSICAL THERAPY NOTE
PT TREATMENT       07/27/23 1529   PT Last Visit   PT Visit Date 07/27/23   Note Type   Note Type Treatment   Pain Assessment   Pain Assessment Tool 0-10   Pain Score No Pain   Patient's Stated Pain Goal No pain   Hospital Pain Intervention(s) Repositioned; Ambulation/increased activity   Multiple Pain Sites No   Restrictions/Precautions   Weight Bearing Precautions Per Order No   Other Precautions Bed Alarm; Chair Alarm; Fall Risk;Pain;Cognitive   General   Chart Reviewed Yes   Family/Caregiver Present Yes   Cognition   Overall Cognitive Status Impaired   Arousal/Participation Cooperative   Attention Difficulty attending to directions   Orientation Level Oriented to person   Following Commands Follows one step commands inconsistently   Subjective   Subjective Pt with no complaints at beginning of session  (pt with intermittent confusion ; family assists with translation and pt able to understanding family intermittently)   Bed Mobility   Rolling L 2  Maximal assistance   Additional items Assist x 2   Supine to Sit 2  Maximal assistance   Additional items Assist x 2   Additional Comments transferred to bedside chair   Transfers   Sit to Stand 2  Maximal assistance  (Progressing to Mod A x 2 on second attempt)   Additional items Assist x 2   Stand to Sit 2  Maximal assistance   Additional items Assist x 2   Stand pivot 2  Maximal assistance   Additional items Assist x 2   Ambulation/Elevation   Gait pattern Short stride; Step to; Foward flexed  (R lateral lean, requires constant tactile cuing to take 2-3 steps to bedside chair)   Gait Assistance 2  Maximal assist  (Mod-Max A x 2)   Additional items Assist x 2;Verbal cues; Tactile cues   Assistive Device Rolling walker   Distance 2 feet to chair   Stair Management Assistance Not tested   Balance   Static Sitting Poor +   Dynamic Sitting Poor   Static Standing Poor -   Dynamic Standing Poor -   Ambulatory Poor -   Activity Tolerance   Activity Tolerance Patient limited by fatigue   Nurse Made Aware yes RAMON Benít[place]   Exercises   Neuro re-ed Able to perform seated exercise including ankle pumps, LAQ, hip abd/add, heel slides x 10 reps bilat   Assessment   Prognosis Good   Problem List Decreased range of motion;Decreased endurance; Impaired balance;Decreased coordination;Decreased mobility; Decreased cognition; Impaired judgement;Decreased safety awareness;Decreased strength   Assessment Pt seen for PT session this afternoon. Continues to require Mod-Max A x 2 for bed mobility + transfers but overall improving from last session and able to complete transfer to bedside chair. Able to perform exercise as mentioned above with intermittent verbal/tactile cues to improve form/ROM. Continues to exhibit significant R lean in standing but able to transfer to chair with Mod-Max A x 2 person assist using RW. Repositioned in bedside chair with all needs within reach. The patient's AM-PAC Basic Mobility Inpatient Short Form Raw Score is 7. A Raw score of less than or equal to 16 suggests the patient may benefit from discharge to post-acute rehabilitation services. Please also refer to the recommendation of the Physical Therapist for safe discharge planning. Goals   Patient Goals unable to state due to cognition   Plan   Treatment/Interventions Functional transfer training;ADL retraining;LE strengthening/ROM; Therapeutic exercise; Endurance training;Bed mobility;Gait training;OT;Spoke to nursing   Progress Slow progress, decreased activity tolerance   PT Frequency Other (Comment)  (5x/week)   Recommendation   PT Discharge Recommendation Post acute rehabilitation services   AM-PAC Basic Mobility Inpatient   Turning in Flat Bed Without Bedrails 2   Lying on Back to Sitting on Edge of Flat Bed Without Bedrails 1   Moving Bed to Chair 1   Standing Up From Chair Using Arms 1   Walk in Room 1   Climb 3-5 Stairs With Railing 1   Basic Mobility Inpatient Raw Score 7   Turning Head Towards [place] 3 Follow Simple Instructions 2   Low Function Basic Mobility Raw Score  12   Low Function Basic Mobility Standardized Score  18.33   Highest Level Of Mobility   -[facility] Goal 2: Bed activities/Dependent transfer   -[facility] Achieved 4: Move to chair/commode   End of Consult   Patient Position at End of Consult Bedside chair;Bed/Chair alarm activated; All needs within reach   [facility] Number  Meghan [place] [id number]

## 2023-07-27 NOTE — PROGRESS NOTES
[facility] Internal Medicine Progress Note  Patient: Danii Carlos 80 y.o. male   MRN: [id number]  PCP: No primary care provider on file. Unit/Bed#: [address] [place] [address] Encounter: [id number]  Date Of Visit: 07/27/23    Problem List:    Principal Problem:    Acute encephalopathy  Active Problems:    Urinary retention    Hypertension    CAD (coronary artery disease)    Elevated liver enzymes    Abnormal CT scan    Compression fracture of vertebral column (HCC)    Hyperlipidemia    Depression with anxiety      Assessment & Plan:    Urinary retention  Assessment & Plan  Bladder scan performed 7/26/2023 showed 465 mL urine. Possibly contributing to delirium  · KUB completed today-monitor for constipation  · Initiate urinary retention protocol  · Straight cath as needed  · UA and cultures pending    * Acute encephalopathy  Assessment & Plan  Patient presents with lethargy started on Sunday afternoon from NH per staff. Pt was found under his wheel chair on Saturday night,unwitnessed. Patient was started on Flexeril 5mg po TID prn on Saturday after the fall,taken 6 doses total so far. · Toxic Encephalopathy likely due to newly started Flexeril, evidenced by lethargy, treated with CT head, frequent neuro checks, IVF, UA and lab monitoring. · CTH, C spine no acute findings  · CT C/A/P showed -  No acute traumatic injury. Bibasilar dependent atelectasis and scattered lower lobe predominant hazy pulmonary infiltrates noted which could be related to aspiration. Trace right pleural effusion. · UA clean  · Ammonia normal  · ABG shows mild hypoxia  · WBC normal, no bands. Afebrile. · Was on oxygen 3L initially. Does not use O2 at baseline. · B12, folate, TSH, vitamin D unremarkable  · Unable to obtain levels of Flexeril  · MRI of brain showed no signs of ischemia  · Mental status improving overall, does seem to have some confusion at baseline  · Remains afebrile without signs and symptoms of infection.   Oxygen requirement has improved, now breathing comfortably on room air. ·  Procalcitonin negative  · Antibiotics, aspiration precaution discontinued  · Continue with stage III dysphagia diet  · PT OT as tolerated  · Neurology consulted, no additional work-up recommended at this time. Agree altered mentation likely due to Flexeril. Noted with delirium overnight 7/26- 7/27  · Avoid sedating medications. · Delirium precaution  · Monitor for constipation, urinary retention  · Frequent reorientation  · Melatonin nightly, attempt to maintain sleep-wake cycle  · Out of bed to chair, activity as tolerated  · Recommend outpatient neurology follow-up for formal memory testing  · Continue neurochecks every 4 hours    Compression fracture of vertebral column ([address])  Assessment & Plan  · CT showed - Age-indeterminate but more likely to be chronic compression fracture deformities of the T11 and L1 vertebrae. · Tylenol, lidocaine patch. · Monitor for back pain-denies pain  · PT/OT    Abnormal CT scan  Assessment & Plan  · CT:  Bibasilar dependent atelectasis and scattered lower lobe predominant hazy pulmonary infiltrates noted which could be related to aspiration  · Was prescribed Avelox 400mg po daily for 6 days for infection ,took 3 doses so far. Staff not sure for what infection. Will hold.   · Discontinue IV antibiotics  · Procal-WNL  · ST eval --continue with level 3 dysphagia diet    Elevated liver enzymes  Assessment & Plan  · Mildly elevated on admission  · AST 54  · Trend LFT    CAD (coronary artery disease)  Assessment & Plan  · Continue Lipitor    Hypertension  Assessment & Plan  On Losartan, Minipress at home  Held Losartan on admission due to elevated Cr initially  Blood pressure trend is improving  · Continue Minipress  · Resumed losartan  · Hydralazine prn        Elevated serum creatinine-resolved as of 7/26/2023  Assessment & Plan  · Cr 1.51 >1.35 > 1.11  · Unclear baseline  · Resolving with gentle IVF  · Continue to trend  · Monitor for retention    Depression with anxiety  Assessment & Plan  · Resume home zoloft at present    Hyperlipidemia  Assessment & Plan  · Continue statin          VTE Pharmacologic Prophylaxis:   Moderate Risk (Score 3-4) - Pharmacological DVT Prophylaxis Ordered: enoxaparin (Lovenox). Patient Centered Rounds: I performed bedside rounds with nursing staff today. Discussions with Specialists or Other Care Team Provider: yes    Education and Discussions with Family / Patient: Updated  (daughter) at bedside. Time Spent for Care: 45 minutes. More than 50% of total time spent on counseling and coordination of care as described above. Current Length of Stay: 3 day(s)  Current Patient Status: Inpatient   Certification Statement: The patient will continue to require additional inpatient hospital stay due to Monitoring of mental status  Discharge Plan: Anticipate discharge in 24-48 hrs to rehab facility. Code Status: Level 1 - Full Code    Subjective:   Patient seen at bedside this morning. No acute distress. Chris  utilized. Denies any pain, following commands. Was slightly more confused this morning, less cooperative with nursing staff. Patient noted to receive Haldol overnight after attempting to get out of bed several times. Moving all extremities spontaneously. Able to raise lower extremities against gravity without any discomfort or worsening of pain. Noted to have urinary retention requiring straight catheterization    Patient was subsequently seen with his daughter at bedside, able to tell his name and identify his sister. Disoriented to place and situation. Denies any pain. Following simple commands.   Denies any shortness of breath, abdominal pain or urinary difficulties      Objective:     Vitals:   Temp (24hrs), Av.9 °F (36.6 °C), Min:97.8 °F (36.6 °C), Max:98.1 °F (36.7 °C)    Temp:  [97.8 °F (36.6 °C)-98.1 °F (36.7 °C)] 97.9 °F (36.6 °C)  HR: [69-78] 74  Resp:  [16-18] 17  BP: (111-166)/(60-78) 166/72  SpO2:  [93 %-96 %] 93 %  Body mass index is 26.11 kg/m². Input and Output Summary (last 24 hours): Intake/Output Summary (Last 24 hours) at 7/27/2023 1443  Last data filed at 7/26/2023 2000  Gross per 24 hour   Intake 110 ml   Output 300 ml   Net -190 ml       Physical Exam:   Physical Exam  Vitals and nursing note reviewed. Constitutional:       General: He is awake. He is not in acute distress. HENT:      Head: Normocephalic and atraumatic. Cardiovascular:      Rate and Rhythm: Normal rate. Pulmonary:      Effort: Pulmonary effort is normal. No respiratory distress. Breath sounds: No wheezing, rhonchi or rales. Comments: Diminished bilaterally  Chest:      Chest wall: No tenderness. Abdominal:      General: Bowel sounds are normal. There is no distension. Palpations: Abdomen is soft. Tenderness: There is no abdominal tenderness. There is no guarding or rebound. Musculoskeletal:      Cervical back: Neck supple. Right lower leg: No edema. Left lower leg: No edema. Skin:     General: Skin is warm and dry. Findings: No rash. Neurological:      Mental Status: He is alert. Mental status is at baseline. Cranial Nerves: No cranial nerve deficit. Comments: Bilateral equal strength. Able to elevate lower extremities against gravity without any worsening of pain   Psychiatric:         Behavior: Behavior is uncooperative. Cognition and Memory: Cognition is impaired.          Additional Data:     Labs:  Results from last 7 days   Lab Units 07/27/23  0557   WBC Thousand/uL 8.24   HEMOGLOBIN g/dL 11.2*   HEMATOCRIT % 35.2*   PLATELETS Thousands/uL 242   NEUTROS PCT % 47   LYMPHS PCT % 33   MONOS PCT % 13*   EOS PCT % 5     Results from last 7 days   Lab Units 07/27/23  0557 07/26/23  0405   SODIUM mmol/L 137 135   POTASSIUM mmol/L 4.0 4.2   CHLORIDE mmol/L 105 104   CO2 mmol/L 25 23   BUN mg/dL 21 20   CREATININE mg/dL 1.07 1.03   ANION GAP mmol/L 7 8   CALCIUM mg/dL 8.6 8.5   ALBUMIN g/dL  --  3.2*   TOTAL BILIRUBIN mg/dL  --  0.68   ALK PHOS U/L  --  47   ALT U/L  --  24   AST U/L  --  35   GLUCOSE RANDOM mg/dL 101 102         Results from last 7 days   Lab Units 07/23/23 2008   POC GLUCOSE mg/dl 158*         Results from last 7 days   Lab Units 07/26/23  0405 07/24/23  0619   PROCALCITONIN ng/ml 0.05 0.08       Lines/Drains:  Invasive Devices     Peripheral Intravenous Line  Duration           Peripheral IV 07/23/23 Right Antecubital 3 days          Drain  Duration           External Urinary Catheter Medium 3 days                      Imaging: Reviewed radiology reports from this admission including: chest CT scan and MRI brain    Recent Cultures (last 7 days):         Last 24 Hours Medication List:   Current Facility-Administered Medications   Medication Dose Route Frequency Provider Last Rate   • acetaminophen  325 mg Oral Q6H PRN Lilia Early MD     • acetaminophen  650 mg Oral Q6H [facility] Lilia Early MD     • atorvastatin  5 mg Oral Daily LIZA Arnold     • enoxaparin  30 mg Subcutaneous Daily LIZA Arnold     • famotidine  20 mg Oral Daily Lilia Early MD     • hydrALAZINE  5 mg Intravenous Q4H PRN LIZA Arnold     • lidocaine  1 patch Topical Daily Lilia Early MD     • losartan  75 mg Oral Daily Lilia Early MD     • melatonin  3 mg Oral HS Lilia Early MD     • multi-electrolyte  50 mL/hr Intravenous Continuous Lilia Early MD     • oxybutynin  5 mg Oral TID LIZA Arnold     • polyethylene glycol  17 g Oral Daily LIZA Arnold     • prazosin  1 mg Oral HS LIZA Arnold          Today, Patient Was Seen By: Lilia Early MD    ** Please Note: "This note has been constructed using a voice recognition system. Therefore there may be syntax, spelling, and/or grammatical errors.  Please call if you have any questions. "**

## 2023-07-27 NOTE — SPEECH THERAPY NOTE
SLP Progress Note    Patient Name: Myra Lewis  ESWTG'A Date: 7/27/2023     Problem List  Principal Problem:    Acute encephalopathy  Active Problems:    Hypertension    Hyperlipidemia    Depression with anxiety    CAD (coronary artery disease)    Elevated liver enzymes    Abnormal CT scan    Compression fracture of vertebral column (HCC)    Subjective:  Israel Horner #[id number] utilized for session. .     Objective:  Patient was seen for follow-up to swallowing evaluation. He was alert, upright and denied pain. His upper dentures were cleaned and offered but he declined placement despite encouragement. He declined scrambled eggs, sausage, fruit, yogurt, applesauce. He excepted sips of coffee, juice, and medications crushed in pudding. Adequate retrieval from the spoon and straw. Relatively prompt transfer and swallow initiation noted. No coughing, throat clearing, change in O2 saturation noted with the limited materials excepted today. Assessment:  Patient tolerated small amounts of thin liquid and pills crushed in pudding with no overt symptoms of oral/pharyngeal dysphagia. He refused placement of his dentures and all foods offered this AM.    Plan/Recommendations:  Continue to offer current NDD 3 dysphagia advanced/soft diet with thin liquids and medications crushed. SLP to continue brief follow-up to ensure tolerance of the least restrictive diet.     Jamir Arroyo[address] [phone]

## 2023-07-27 NOTE — PLAN OF CARE
Problem: PHYSICAL THERAPY ADULT  Goal: Performs mobility at highest level of function for planned discharge setting. See evaluation for individualized goals. Description: Treatment/Interventions: Functional transfer training, ADL retraining, LE strengthening/ROM, Therapeutic exercise, Endurance training, Bed mobility, Gait training, OT, Spoke to nursing          See flowsheet documentation for full assessment, interventions and recommendations. Outcome: Progressing  Note: Prognosis: Good  Problem List: Decreased range of motion, Decreased endurance, Impaired balance, Decreased coordination, Decreased mobility, Decreased cognition, Impaired judgement, Decreased safety awareness, Decreased strength  Assessment: Pt seen for PT session this afternoon. Continues to require Mod-Max A x 2 for bed mobility + transfers but overall improving from last session and able to complete transfer to bedside chair. Able to perform exercise as mentioned above with intermittent verbal/tactile cues to improve form/ROM. Continues to exhibit significant R lean in standing but able to transfer to chair with Mod-Max A x 2 person assist using RW. Repositioned in bedside chair with all needs within reach. PT Discharge Recommendation: Post acute rehabilitation services    See flowsheet documentation for full assessment.

## 2023-07-27 NOTE — CASE MANAGEMENT
Case Management Discharge Planning Note    Patient name Eugenie Holter  Location [address] [facility]-* MRN [id number]  : 1929 Date 2023       Current Admission Date: 2023  Current Admission Diagnosis:Acute encephalopathy   Patient Active Problem List    Diagnosis Date Noted   • Hypertension 2023   • Hyperlipidemia 2023   • Acute encephalopathy 2023   • Depression with anxiety 2023   • CAD (coronary artery disease) 2023   • Elevated liver enzymes 2023   • Abnormal CT scan 2023   • Compression fracture of vertebral column ([address]) 2023      LOS (days): 3  Geometric Mean LOS (GMLOS) (days): 5.20  Days to GMLOS:1.8     OBJECTIVE:  Risk of Unplanned Readmission Score: 12.18         Current admission status: Inpatient   Preferred Pharmacy: No Pharmacies Listed  Primary Care Provider: No primary care provider on file.     Primary Insurance: [facility] REP  Secondary Insurance: [address] Number: [id number]

## 2023-07-27 NOTE — NURSING NOTE
Haldol po given as pt is refusing to stay in bed;constantly trying to climb out,trying to remove I/v line,venodynes etc..Will continue to monitor. Fall precautions reinforced.

## 2023-07-27 NOTE — ASSESSMENT & PLAN NOTE
Bladder scan performed 7/26/2023 showed 465 mL urine. Possibly contributed to delirium  Now improved with increase mobility  · KUB completed-no significant constipation  · Monitor with urinary retention protocol  · UA showed small amount of leukocytes- no evidence of infection.   Urine culture-negative  · [place] catheter now in place  · Activity as tolerated

## 2023-07-28 PROBLEM — R74.8 ELEVATED LIVER ENZYMES: Status: RESOLVED | Noted: 2023-07-24 | Resolved: 2023-07-28

## 2023-07-28 LAB
ANION GAP SERPL CALCULATED.3IONS-SCNC: 6 MMOL/L
BUN SERPL-MCNC: 16 MG/DL (ref 5–25)
CALCIUM SERPL-MCNC: 8.5 MG/DL (ref 8.4–10.2)
CHLORIDE SERPL-SCNC: 104 MMOL/L (ref 96–108)
CO2 SERPL-SCNC: 28 MMOL/L (ref 21–32)
CREAT SERPL-MCNC: 1.07 MG/DL (ref 0.6–1.3)
ERYTHROCYTE [DISTWIDTH] IN BLOOD BY AUTOMATED COUNT: 14.2 % (ref 11.6–15.1)
GFR SERPL CREATININE-BSD FRML MDRD: 59 ML/MIN/1.73SQ M
GLUCOSE SERPL-MCNC: 113 MG/DL (ref 65–140)
HCT VFR BLD AUTO: 36.2 % (ref 36.5–49.3)
HGB BLD-MCNC: 11.6 G/DL (ref 12–17)
MCH RBC QN AUTO: 32 PG (ref 26.8–34.3)
MCHC RBC AUTO-ENTMCNC: 32 G/DL (ref 31.4–37.4)
MCV RBC AUTO: 100 FL (ref 82–98)
PLATELET # BLD AUTO: 236 THOUSANDS/UL (ref 149–390)
PMV BLD AUTO: 12.3 FL (ref 8.9–12.7)
POTASSIUM SERPL-SCNC: 3.9 MMOL/L (ref 3.5–5.3)
RBC # BLD AUTO: 3.62 MILLION/UL (ref 3.88–5.62)
SODIUM SERPL-SCNC: 138 MMOL/L (ref 135–147)
WBC # BLD AUTO: 8.1 THOUSAND/UL (ref 4.31–10.16)

## 2023-07-28 PROCEDURE — 99232 SBSQ HOSP IP/OBS MODERATE 35: CPT | Performed by: INTERNAL MEDICINE

## 2023-07-28 PROCEDURE — 85027 COMPLETE CBC AUTOMATED: CPT | Performed by: INTERNAL MEDICINE

## 2023-07-28 PROCEDURE — 80048 BASIC METABOLIC PNL TOTAL CA: CPT | Performed by: INTERNAL MEDICINE

## 2023-07-28 RX ORDER — RISPERIDONE 0.25 MG/1
0.25 TABLET ORAL
Status: DISCONTINUED | OUTPATIENT
Start: 2023-07-28 | End: 2023-07-31 | Stop reason: HOSPADM

## 2023-07-28 RX ORDER — HALOPERIDOL 1 MG/1
2 TABLET ORAL ONCE
Status: COMPLETED | OUTPATIENT
Start: 2023-07-28 | End: 2023-07-28

## 2023-07-28 RX ADMIN — FAMOTIDINE 20 MG: 20 TABLET ORAL at 08:53

## 2023-07-28 RX ADMIN — OXYBUTYNIN CHLORIDE 5 MG: 5 TABLET ORAL at 17:08

## 2023-07-28 RX ADMIN — ACETAMINOPHEN 650 MG: 325 TABLET ORAL at 17:08

## 2023-07-28 RX ADMIN — OXYBUTYNIN CHLORIDE 5 MG: 5 TABLET ORAL at 22:35

## 2023-07-28 RX ADMIN — LIDOCAINE 1 PATCH: 700 PATCH TOPICAL at 08:51

## 2023-07-28 RX ADMIN — HALOPERIDOL 2 MG: 1 TABLET ORAL at 00:26

## 2023-07-28 RX ADMIN — OXYBUTYNIN CHLORIDE 5 MG: 5 TABLET ORAL at 08:50

## 2023-07-28 RX ADMIN — SODIUM CHLORIDE, SODIUM GLUCONATE, SODIUM ACETATE, POTASSIUM CHLORIDE, MAGNESIUM CHLORIDE, SODIUM PHOSPHATE, DIBASIC, AND POTASSIUM PHOSPHATE 50 ML/HR: .53; .5; .37; .037; .03; .012; .00082 INJECTION, SOLUTION INTRAVENOUS at 12:17

## 2023-07-28 RX ADMIN — POLYETHYLENE GLYCOL 3350 17 G: 17 POWDER, FOR SOLUTION ORAL at 08:51

## 2023-07-28 RX ADMIN — PRAZOSIN HYDROCHLORIDE 1 MG: 1 CAPSULE ORAL at 22:36

## 2023-07-28 RX ADMIN — RISPERIDONE 0.25 MG: 0.25 TABLET, FILM COATED ORAL at 22:35

## 2023-07-28 RX ADMIN — Medication 3 MG: at 22:35

## 2023-07-28 RX ADMIN — ATORVASTATIN CALCIUM 5 MG: 10 TABLET, FILM COATED ORAL at 08:50

## 2023-07-28 RX ADMIN — LOSARTAN POTASSIUM 75 MG: 50 TABLET, FILM COATED ORAL at 08:50

## 2023-07-28 RX ADMIN — ACETAMINOPHEN 650 MG: 325 TABLET ORAL at 12:25

## 2023-07-28 RX ADMIN — ENOXAPARIN SODIUM 30 MG: 30 INJECTION SUBCUTANEOUS at 08:51

## 2023-07-28 NOTE — PROGRESS NOTES
[facility] Internal Medicine Progress Note  Patient: Elba Greene 80 y.o. male   MRN: [id number]  PCP: No primary care provider on file. Unit/Bed#: [address] [place] [address] Encounter: [id number]  Date Of Visit: 07/28/23    Problem List:    Principal Problem:    Acute encephalopathy  Active Problems:    Urinary retention    Hypertension    CAD (coronary artery disease)    Abnormal CT scan    Compression fracture of vertebral column (HCC)    Hyperlipidemia    Depression with anxiety      Assessment & Plan:    Urinary retention  Assessment & Plan  Bladder scan performed 7/26/2023 showed 465 mL urine. Possibly contributing to delirium  · KUB completed-no significant constipation  · Initiate urinary retention protocol  · Straight cath as needed  · UA showed small amount of leukocytes- no evidence of infection. Urine culture pending  · [place] catheter now in place  · Activity as tolerated    * Acute encephalopathy  Assessment & Plan  Patient presents with lethargy started on Sunday afternoon from NH per staff. Pt was found under his wheel chair on Saturday night,unwitnessed. Patient was started on Flexeril 5mg po TID prn on Saturday after the fall,taken 6 doses total so far. Toxic Encephalopathy likely due to newly started Flexeril, evidenced by lethargy, treated with CT head, frequent neuro checks, IVF, UA and lab monitoring. · CTH, C spine no acute findings  · CT C/A/P showed -  No acute traumatic injury. Bibasilar dependent atelectasis and scattered lower lobe predominant hazy pulmonary infiltrates noted which could be related to aspiration. Trace right pleural effusion. · UA clean  · Ammonia normal  · WBC normal, no bands. Afebrile. · Was on oxygen 3L initially. Does not use O2 at baseline.    · B12, folate, TSH, vitamin D unremarkable  · MRI of brain showed no signs of ischemia  · Mental status improving overall, does seem to have some confusion at baseline suspect underlying early dementia  · Remains afebrile without signs and symptoms of infection. Oxygen requirement has improved, now breathing comfortably on room air. ·  Procalcitonin negative  · Antibiotics, aspiration precaution discontinued  · Continue with stage III dysphagia diet  · PT OT as tolerated  · Neurology consulted, no additional work-up recommended at this time. Agree altered mentation on presentation likely due to Flexeril. Noted with delirium overnight secondary to sundowning  · Avoid sedating medications. · Delirium precaution  · Monitor for constipation, urinary retention  · Frequent reorientation  · Melatonin nightly, attempt to maintain sleep-wake cycle  · Will start Risperdal 0.25 mg nightly for short duration to help with confusion/delirium  · Out of bed to chair, activity as tolerated -continues to require max assist  · Recommend outpatient neurology follow-up for formal memory testing  · Continue neurochecks     Compression fracture of vertebral column ([address])  Assessment & Plan  · CT showed - Age-indeterminate but more likely to be chronic compression fracture deformities of the T11 and L1 vertebrae. · Tylenol, lidocaine patch. · Monitor for back pain-denies pain today  · PT/OT as tolerated    Abnormal CT scan  Assessment & Plan  · CT:  Bibasilar dependent atelectasis and scattered lower lobe predominant hazy pulmonary infiltrates noted which could be related to aspiration  · Was prescribed Avelox 400mg po daily for 6 days for infection ,took 3 doses so far. Staff not sure for what infection. Will hold.   · Discontinue IV antibiotics  · Procal-WNL  · ST eval --continue with level 3 dysphagia diet    CAD (coronary artery disease)  Assessment & Plan  · Continue Lipitor    Hypertension  Assessment & Plan  On Losartan, Minipress at home  Held Losartan on admission due to elevated Cr initially  Blood pressure trend is improving  · Continue Minipress  · Resumed losartan  · Hydralazine prn        Elevated liver enzymes-resolved as of 2023  Assessment & Plan  · Mildly elevated on admission  · AST 54  · Trend LFT    Elevated serum creatinine-resolved as of 2023  Assessment & Plan  · Cr 1.51 >1.35 > 1.11 > 1.07  · Unclear baseline  · Resolving with gentle IVF  · Continue to trend  · Monitor for retention    Depression with anxiety  Assessment & Plan  · Resume home zoloft at present    Hyperlipidemia  Assessment & Plan  · Continue statin          VTE Pharmacologic Prophylaxis:   Moderate Risk (Score 3-4) - Pharmacological DVT Prophylaxis Ordered: enoxaparin (Lovenox). Patient Centered Rounds: I performed bedside rounds with nursing staff today. Discussions with Specialists or Other Care Team Provider: yes    Education and Discussions with Family / Patient: Updated  (daughter) at bedside. Time Spent for Care: 45 minutes. More than 50% of total time spent on counseling and coordination of care as described above. Current Length of Stay: 4 day(s)  Current Patient Status: Inpatient   Certification Statement: The patient will continue to require additional inpatient hospital stay due to Monitoring of mental status  Discharge Plan: Anticipate discharge in 24-48 hrs to rehab facility. Code Status: Level 1 - Full Code    Subjective:   Patient seen at bedside this morning. No acute distress. Chris  utilized. Denies any pain, following commands. Remains slightly more confused this morning but has been cooperative with nursing staff today. Patient received dose of Haldol last night due to some aggression towards nursing staff. Moving all extremities spontaneously. Able to raise lower extremities against gravity without any discomfort or worsening of pain. Continues to be disoriented to place and situation. Denies any pain. Following simple commands. Denies any shortness of breath, abdominal pain or urinary difficulties.        Objective:     Vitals:   Temp (24hrs), Av.7 °F (36.5 °C), Min:96.8 °F (36 °C), Max:98.3 °F (36.8 °C)    Temp:  [96.8 °F (36 °C)-98.3 °F (36.8 °C)] 98 °F (36.7 °C)  HR:  [76-95] 76  Resp:  [18-22] 18  BP: (133-170)/(81-98) 148/91  SpO2:  [94 %-97 %] 97 %  Body mass index is 26.11 kg/m². Input and Output Summary (last 24 hours): Intake/Output Summary (Last 24 hours) at 7/28/2023 1329  Last data filed at 7/28/2023 1100  Gross per 24 hour   Intake 50 ml   Output 850 ml   Net -800 ml       Physical Exam:   Physical Exam  Vitals reviewed. Constitutional:       General: He is awake. He is not in acute distress. Appearance: He is not ill-appearing. HENT:      Head: Normocephalic and atraumatic. Cardiovascular:      Rate and Rhythm: Normal rate. Pulmonary:      Effort: Pulmonary effort is normal. No respiratory distress. Breath sounds: No wheezing, rhonchi or rales. Chest:      Chest wall: No tenderness. Abdominal:      General: Bowel sounds are normal. There is no distension. Palpations: Abdomen is soft. Tenderness: There is no abdominal tenderness. There is no guarding or rebound. Genitourinary:     Comments: [place] catheter in place  Musculoskeletal:      Comments: Bilateral equal strength. Able to elevate lower extremities against gravity without any worsening of pain   Skin:     General: Skin is warm and dry. Findings: No rash. Neurological:      Mental Status: He is alert. He is confused. Cranial Nerves: No cranial nerve deficit. Psychiatric:         Behavior: Behavior is cooperative. Cognition and Memory: Cognition is impaired.          Additional Data:     Labs:  Results from last 7 days   Lab Units 07/28/23  0529 07/27/23  0557   WBC Thousand/uL 8.10 8.24   HEMOGLOBIN g/dL 11.6* 11.2*   HEMATOCRIT % 36.2* 35.2*   PLATELETS Thousands/uL 236 242   NEUTROS PCT %  --  47   LYMPHS PCT %  --  33   MONOS PCT %  --  13*   EOS PCT %  --  5     Results from last 7 days   Lab Units 07/28/23  0529 07/27/23  0557 07/26/23  0405 SODIUM mmol/L 138   < > 135   POTASSIUM mmol/L 3.9   < > 4.2   CHLORIDE mmol/L 104   < > 104   CO2 mmol/L 28   < > 23   BUN mg/dL 16   < > 20   CREATININE mg/dL 1.07   < > 1.03   ANION GAP mmol/L 6   < > 8   CALCIUM mg/dL 8.5   < > 8.5   ALBUMIN g/dL  --   --  3.2*   TOTAL BILIRUBIN mg/dL  --   --  0.68   ALK PHOS U/L  --   --  47   ALT U/L  --   --  24   AST U/L  --   --  35   GLUCOSE RANDOM mg/dL 113   < > 102    < > = values in this interval not displayed.          Results from last 7 days   Lab Units 07/23/23 2008   POC GLUCOSE mg/dl 158*         Results from last 7 days   Lab Units 07/26/23  0405 07/24/23  0619   PROCALCITONIN ng/ml 0.05 0.08       Lines/Drains:  Invasive Devices     Peripheral Intravenous Line  Duration           Peripheral IV 07/27/23 Left;Ventral (anterior) Forearm 1 day          Drain  Duration           External Urinary Catheter Medium 4 days                      Imaging: Reviewed radiology reports from this admission including: chest CT scan and MRI brain    Recent Cultures (last 7 days):         Last 24 Hours Medication List:   Current Facility-Administered Medications   Medication Dose Route Frequency Provider Last Rate   • acetaminophen  325 mg Oral Q6H PRN De Bishop MD     • acetaminophen  650 mg Oral Q6H [address] De Bishop MD     • atorvastatin  5 mg Oral Daily LIZA Arnold     • enoxaparin  30 mg Subcutaneous Daily LIZA Arnold     • famotidine  20 mg Oral Daily De Bishop MD     • hydrALAZINE  5 mg Intravenous Q4H PRN LIZA Arnold     • lidocaine  1 patch Topical Daily De Bishop MD     • losartan  75 mg Oral Daily De Bishop MD     • melatonin  3 mg Oral HS De Bishop MD     • multi-electrolyte  50 mL/hr Intravenous Continuous De Bishop MD 50 mL/hr (07/28/23 1217)   • oxybutynin  5 mg Oral TID LIZA Arnold     • polyethylene glycol  17 g Oral Daily LIZA Arnold     • prazosin  1 mg Oral HS LIZA Trujillo • risperiDONE  0.25 mg Oral HS Machelle Gandhi MD          Today, Patient Was Seen By: Machelle Gandhi MD    ** Please Note: "This note has been constructed using a voice recognition system. Therefore there may be syntax, spelling, and/or grammatical errors.  Please call if you have any questions. "**

## 2023-07-28 NOTE — PLAN OF CARE
Problem: Potential for Falls  Goal: Patient will remain free of falls  Description: INTERVENTIONS:  - Educate patient/family on patient safety including physical limitations  - Instruct patient to call for assistance with activity   - Consult OT/PT to assist with strengthening/mobility   - Keep Call bell within reach  - Keep bed low and locked with side rails adjusted as appropriate  - Keep care items and personal belongings within reach  - Initiate and maintain comfort rounds  - Make Fall Risk Sign visible to staff  - Offer Toileting every 2 Hours, in advance of need  - Initiate/Maintain bed alarm  - Apply yellow socks and bracelet for high fall risk patients  - Consider moving patient to room near nurses station  Outcome: Progressing     Problem: Prexisting or High Potential for Compromised Skin Integrity  Goal: Skin integrity is maintained or improved  Description: INTERVENTIONS:  - Identify patients at risk for skin breakdown  - Assess and monitor skin integrity  - Assess and monitor nutrition and hydration status  - Monitor labs   - Assess for incontinence   - Turn and reposition patient  - Assist with mobility/ambulation  - Relieve pressure over bony prominences  - Avoid friction and shearing  - Provide appropriate hygiene as needed including keeping skin clean and dry  - Evaluate need for skin moisturizer/barrier cream  - Collaborate with interdisciplinary team   - Patient/family teaching  - Consider wound care consult   Outcome: Progressing     Problem: MOBILITY - ADULT  Goal: Maintain or return to baseline ADL function  Description: INTERVENTIONS:  - Educate patient/family on patient safety including physical limitations  - Instruct patient to call for assistance with activity   - Consult OT/PT to assist with strengthening/mobility   - Keep Call bell within reach  - Keep bed low and locked with side rails adjusted as appropriate  - Keep care items and personal belongings within reach  - Initiate and maintain comfort rounds  - Make Fall Risk Sign visible to staff    - Apply yellow socks and bracelet for high fall risk patients  - Consider moving patient to room near nurses station  Outcome: Progressing  Goal: Maintains/Returns to pre admission functional level  Description: INTERVENTIONS:  - Perform BMAT or MOVE assessment daily.   - Set and communicate daily mobility goal to care team and patient/family/caregiver. - Collaborate with rehabilitation services on mobility goals if consulted  - Perform Range of Motion 4 times a day.   - Reposition patient every 2 hours  - Out of bed for toileting  - Record patient progress and toleration of activity level   Outcome: Progressing     Problem: PAIN - ADULT  Goal: Verbalizes/displays adequate comfort level or baseline comfort level  Description: Interventions:  - Encourage patient to monitor pain and request assistance  - Assess pain using appropriate pain scale  - Administer analgesics based on type and severity of pain and evaluate response  - Implement non-pharmacological measures as appropriate and evaluate response  - Consider cultural and social influences on pain and pain management  - Notify physician/advanced practitioner if interventions unsuccessful or patient reports new pain  Outcome: Progressing     Problem: INFECTION - ADULT  Goal: Absence or prevention of progression during hospitalization  Description: INTERVENTIONS:  - Assess and monitor for signs and symptoms of infection  - Monitor lab/diagnostic results  - Monitor all insertion sites, i.e. indwelling lines  - Administer medications as ordered  - Instruct and encourage patient and family to use good hand hygiene technique    Outcome: Progressing  Goal: Absence of fever/infection during neutropenic period  Description: INTERVENTIONS:  - Monitor WBC    Outcome: Progressing     Problem: SAFETY ADULT  Goal: Patient will remain free of falls  Description: INTERVENTIONS:  - Educate patient/family on patient safety including physical limitations  - Instruct patient to call for assistance with activity   - Consult OT/PT to assist with strengthening/mobility   - Keep Call bell within reach  - Keep bed low and locked with side rails adjusted as appropriate  - Keep care items and personal belongings within reach  - Initiate and maintain comfort rounds  - Make Fall Risk Sign visible to staff  - Offer Toileting every 2 Hours, in advance of need  - Initiate/Maintain bed alarm  - Apply yellow socks and bracelet for high fall risk patients  - Consider moving patient to room near nurses station  Outcome: Progressing  Goal: Maintain or return to baseline ADL function  Description: INTERVENTIONS:  - Educate patient/family on patient safety including physical limitations  - Instruct patient to call for assistance with activity   - Consult OT/PT to assist with strengthening/mobility   - Keep Call bell within reach  - Keep bed low and locked with side rails adjusted as appropriate  - Keep care items and personal belongings within reach  - Initiate and maintain comfort rounds  - Make Fall Risk Sign visible to staff    - Apply yellow socks and bracelet for high fall risk patients  - Consider moving patient to room near nurses station  Outcome: Progressing  Goal: Maintains/Returns to pre admission functional level  Description: INTERVENTIONS:  - Perform BMAT or MOVE assessment daily.   - Set and communicate daily mobility goal to care team and patient/family/caregiver. - Collaborate with rehabilitation services on mobility goals if consulted  - Perform Range of Motion 4 times a day. - Reposition patient every 2 hours.     - Stand patient 4 times a day  - Ambulate patient 4 times a day  - Out of bed to chair 3 times a day   - Out of bed for meals 3 times a day  - Out of bed for toileting  - Record patient progress and toleration of activity level   Outcome: Progressing     Problem: DISCHARGE PLANNING  Goal: Discharge to home or other facility with appropriate resources  Description: INTERVENTIONS:  - Identify barriers to discharge w/patient and caregiver  - Arrange for needed discharge resources and transportation as appropriate  - Identify discharge learning needs (meds, wound care, etc.)  - Arrange for interpretive services to assist at discharge as needed  - Refer to Case Management Department for coordinating discharge planning if the patient needs post-hospital services based on physician/advanced practitioner order or complex needs related to functional status, cognitive ability, or social support system  Outcome: Progressing     Problem: Knowledge Deficit  Goal: Patient/family/caregiver demonstrates understanding of disease process, treatment plan, medications, and discharge instructions  Description: Complete learning assessment and assess knowledge base.   Interventions:  - Provide teaching at level of understanding  - Provide teaching via preferred learning methods  Outcome: Progressing

## 2023-07-29 LAB
ANION GAP SERPL CALCULATED.3IONS-SCNC: 7 MMOL/L
BACTERIA UR CULT: NORMAL
BUN SERPL-MCNC: 16 MG/DL (ref 5–25)
CALCIUM SERPL-MCNC: 8.2 MG/DL (ref 8.4–10.2)
CHLORIDE SERPL-SCNC: 105 MMOL/L (ref 96–108)
CO2 SERPL-SCNC: 26 MMOL/L (ref 21–32)
CREAT SERPL-MCNC: 0.97 MG/DL (ref 0.6–1.3)
ERYTHROCYTE [DISTWIDTH] IN BLOOD BY AUTOMATED COUNT: 14.1 % (ref 11.6–15.1)
GFR SERPL CREATININE-BSD FRML MDRD: 66 ML/MIN/1.73SQ M
GLUCOSE SERPL-MCNC: 109 MG/DL (ref 65–140)
HCT VFR BLD AUTO: 35.3 % (ref 36.5–49.3)
HGB BLD-MCNC: 11.4 G/DL (ref 12–17)
MCH RBC QN AUTO: 32.1 PG (ref 26.8–34.3)
MCHC RBC AUTO-ENTMCNC: 32.3 G/DL (ref 31.4–37.4)
MCV RBC AUTO: 99 FL (ref 82–98)
PLATELET # BLD AUTO: 216 THOUSANDS/UL (ref 149–390)
PMV BLD AUTO: 12.2 FL (ref 8.9–12.7)
POTASSIUM SERPL-SCNC: 4 MMOL/L (ref 3.5–5.3)
RBC # BLD AUTO: 3.55 MILLION/UL (ref 3.88–5.62)
SODIUM SERPL-SCNC: 138 MMOL/L (ref 135–147)
WBC # BLD AUTO: 6.92 THOUSAND/UL (ref 4.31–10.16)

## 2023-07-29 PROCEDURE — 85027 COMPLETE CBC AUTOMATED: CPT | Performed by: INTERNAL MEDICINE

## 2023-07-29 PROCEDURE — 99232 SBSQ HOSP IP/OBS MODERATE 35: CPT | Performed by: INTERNAL MEDICINE

## 2023-07-29 PROCEDURE — 80048 BASIC METABOLIC PNL TOTAL CA: CPT | Performed by: INTERNAL MEDICINE

## 2023-07-29 RX ORDER — LOSARTAN POTASSIUM 50 MG/1
100 TABLET ORAL DAILY
Status: DISCONTINUED | OUTPATIENT
Start: 2023-07-30 | End: 2023-07-31 | Stop reason: HOSPADM

## 2023-07-29 RX ORDER — LOSARTAN POTASSIUM 25 MG/1
25 TABLET ORAL ONCE
Status: COMPLETED | OUTPATIENT
Start: 2023-07-29 | End: 2023-07-29

## 2023-07-29 RX ADMIN — ACETAMINOPHEN 650 MG: 325 TABLET ORAL at 12:42

## 2023-07-29 RX ADMIN — OXYBUTYNIN CHLORIDE 5 MG: 5 TABLET ORAL at 17:40

## 2023-07-29 RX ADMIN — SERTRALINE HYDROCHLORIDE 50 MG: 50 TABLET ORAL at 08:10

## 2023-07-29 RX ADMIN — FAMOTIDINE 20 MG: 20 TABLET ORAL at 08:09

## 2023-07-29 RX ADMIN — HYDRALAZINE HYDROCHLORIDE 5 MG: 20 INJECTION INTRAMUSCULAR; INTRAVENOUS at 16:12

## 2023-07-29 RX ADMIN — LOSARTAN POTASSIUM 75 MG: 50 TABLET, FILM COATED ORAL at 08:09

## 2023-07-29 RX ADMIN — RISPERIDONE 0.25 MG: 0.25 TABLET, FILM COATED ORAL at 21:48

## 2023-07-29 RX ADMIN — ENOXAPARIN SODIUM 30 MG: 30 INJECTION SUBCUTANEOUS at 08:10

## 2023-07-29 RX ADMIN — ATORVASTATIN CALCIUM 5 MG: 10 TABLET, FILM COATED ORAL at 08:10

## 2023-07-29 RX ADMIN — OXYBUTYNIN CHLORIDE 5 MG: 5 TABLET ORAL at 21:48

## 2023-07-29 RX ADMIN — OXYBUTYNIN CHLORIDE 5 MG: 5 TABLET ORAL at 08:10

## 2023-07-29 RX ADMIN — ACETAMINOPHEN 650 MG: 325 TABLET ORAL at 23:08

## 2023-07-29 RX ADMIN — ACETAMINOPHEN 650 MG: 325 TABLET ORAL at 17:39

## 2023-07-29 RX ADMIN — ACETAMINOPHEN 650 MG: 325 TABLET ORAL at 05:08

## 2023-07-29 RX ADMIN — Medication 3 MG: at 21:48

## 2023-07-29 RX ADMIN — POLYETHYLENE GLYCOL 3350 17 G: 17 POWDER, FOR SOLUTION ORAL at 08:10

## 2023-07-29 RX ADMIN — LIDOCAINE 1 PATCH: 700 PATCH TOPICAL at 08:10

## 2023-07-29 RX ADMIN — ACETAMINOPHEN 650 MG: 325 TABLET ORAL at 00:40

## 2023-07-29 RX ADMIN — PRAZOSIN HYDROCHLORIDE 1 MG: 1 CAPSULE ORAL at 21:50

## 2023-07-29 RX ADMIN — LOSARTAN POTASSIUM 25 MG: 25 TABLET, FILM COATED ORAL at 17:53

## 2023-07-29 NOTE — PLAN OF CARE
Problem: Potential for Falls  Goal: Patient will remain free of falls  Description: INTERVENTIONS:  - Educate patient/family on patient safety including physical limitations  - Instruct patient to call for assistance with activity   - Consult OT/PT to assist with strengthening/mobility   - Keep Call bell within reach  - Keep bed low and locked with side rails adjusted as appropriate  - Keep care items and personal belongings within reach  - Initiate and maintain comfort rounds  - Make Fall Risk Sign visible to staff  - Offer Toileting every 2 Hours, in advance of need  - Initiate/Maintain bed alarm  - Apply yellow socks and bracelet for high fall risk patients  - Consider moving patient to room near nurses station  Outcome: Progressing     Problem: Prexisting or High Potential for Compromised Skin Integrity  Goal: Skin integrity is maintained or improved  Description: INTERVENTIONS:  - Identify patients at risk for skin breakdown  - Assess and monitor skin integrity  - Assess and monitor nutrition and hydration status  - Monitor labs   - Assess for incontinence   - Turn and reposition patient  - Assist with mobility/ambulation  - Relieve pressure over bony prominences  - Avoid friction and shearing  - Provide appropriate hygiene as needed including keeping skin clean and dry  - Evaluate need for skin moisturizer/barrier cream  - Collaborate with interdisciplinary team   - Patient/family teaching  - Consider wound care consult   Outcome: Progressing     Problem: MOBILITY - ADULT  Goal: Maintain or return to baseline ADL function  Description: INTERVENTIONS:  - Educate patient/family on patient safety including physical limitations  - Instruct patient to call for assistance with activity   - Consult OT/PT to assist with strengthening/mobility   - Keep Call bell within reach  - Keep bed low and locked with side rails adjusted as appropriate  - Keep care items and personal belongings within reach  - Initiate and maintain comfort rounds  - Make Fall Risk Sign visible to staff    - Apply yellow socks and bracelet for high fall risk patients  - Consider moving patient to room near nurses station  Outcome: Progressing  Goal: Maintains/Returns to pre admission functional level  Description: INTERVENTIONS:  - Perform BMAT or MOVE assessment daily.   - Set and communicate daily mobility goal to care team and patient/family/caregiver.    - Collaborate with rehabilitation services on mobility goals if consulted  - Reposition patient every 2 hours  - Out of bed to chair 3 times a day   - Out of bed for meals 3 times a day  - Out of bed for toileting  - Record patient progress and toleration of activity level   Outcome: Progressing     Problem: PAIN - ADULT  Goal: Verbalizes/displays adequate comfort level or baseline comfort level  Description: Interventions:  - Encourage patient to monitor pain and request assistance  - Assess pain using appropriate pain scale  - Administer analgesics based on type and severity of pain and evaluate response  - Implement non-pharmacological measures as appropriate and evaluate response  - Consider cultural and social influences on pain and pain management  - Notify physician/advanced practitioner if interventions unsuccessful or patient reports new pain  Outcome: Progressing     Problem: INFECTION - ADULT  Goal: Absence or prevention of progression during hospitalization  Description: INTERVENTIONS:  - Assess and monitor for signs and symptoms of infection  - Monitor lab/diagnostic results  - Monitor all insertion sites, i.e. indwelling lines  - Administer medications as ordered  - Instruct and encourage patient and family to use good hand hygiene technique    Outcome: Progressing  Goal: Absence of fever/infection during neutropenic period  Description: INTERVENTIONS:  - Monitor WBC    Outcome: Progressing     Problem: SAFETY ADULT  Goal: Patient will remain free of falls  Description: INTERVENTIONS:  - Educate patient/family on patient safety including physical limitations  - Instruct patient to call for assistance with activity   - Consult OT/PT to assist with strengthening/mobility   - Keep Call bell within reach  - Keep bed low and locked with side rails adjusted as appropriate  - Keep care items and personal belongings within reach  - Initiate and maintain comfort rounds  - Make Fall Risk Sign visible to staff  - Offer Toileting every 2 Hours, in advance of need  - Initiate/Maintain bed alarm  - Apply yellow socks and bracelet for high fall risk patients  - Consider moving patient to room near nurses station  Outcome: Progressing  Goal: Maintain or return to baseline ADL function  Description: INTERVENTIONS:  - Educate patient/family on patient safety including physical limitations  - Instruct patient to call for assistance with activity   - Consult OT/PT to assist with strengthening/mobility   - Keep Call bell within reach  - Keep bed low and locked with side rails adjusted as appropriate  - Keep care items and personal belongings within reach  - Initiate and maintain comfort rounds  - Make Fall Risk Sign visible to staff    - Apply yellow socks and bracelet for high fall risk patients  - Consider moving patient to room near nurses station  Outcome: Progressing  Goal: Maintains/Returns to pre admission functional level  Description: INTERVENTIONS:  - Perform BMAT or MOVE assessment daily.   - Set and communicate daily mobility goal to care team and patient/family/caregiver. - Collaborate with rehabilitation services on mobility goals if consulted  - Reposition patient every 2 hours.   - Stand patient 3 times a day  - Out of bed to chair 3 times a day   - Out of bed for meals 3 times a day  - Out of bed for toileting  - Record patient progress and toleration of activity level   Outcome: Progressing     Problem: DISCHARGE PLANNING  Goal: Discharge to home or other facility with appropriate resources  Description: INTERVENTIONS:  - Identify barriers to discharge w/patient and caregiver  - Arrange for needed discharge resources and transportation as appropriate  - Identify discharge learning needs (meds, wound care, etc.)  - Arrange for interpretive services to assist at discharge as needed  - Refer to Case Management Department for coordinating discharge planning if the patient needs post-hospital services based on physician/advanced practitioner order or complex needs related to functional status, cognitive ability, or social support system  Outcome: Progressing     Problem: Knowledge Deficit  Goal: Patient/family/caregiver demonstrates understanding of disease process, treatment plan, medications, and discharge instructions  Description: Complete learning assessment and assess knowledge base. Interventions:  - Provide teaching at level of understanding  - Provide teaching via preferred learning methods  Outcome: Progressing     Problem: Nutrition/Hydration-ADULT  Goal: Nutrient/Hydration intake appropriate for improving, restoring or maintaining nutritional needs  Description: Monitor and assess patient's nutrition/hydration status for malnutrition. Collaborate with interdisciplinary team and initiate plan and interventions as ordered. Monitor patient's weight and dietary intake as ordered or per policy. Utilize nutrition screening tool and intervene as necessary. Determine patient's food preferences and provide high-protein, high-caloric foods as appropriate.      INTERVENTIONS:  - Monitor oral intake, urinary output, labs, and treatment plans  - Assess nutrition and hydration status and recommend course of action  - Evaluate amount of meals eaten  - Assist patient with eating if necessary   - Allow adequate time for meals  - Recommend/ encourage appropriate diets, oral nutritional supplements, and vitamin/mineral supplements  - Order, calculate, and assess calorie counts as needed  - Recommend, monitor, and adjust tube feedings and TPN/PPN based on assessed needs  - Assess need for intravenous fluids  - Provide specific nutrition/hydration education as appropriate  - Include patient/family/caregiver in decisions related to nutrition  Outcome: Progressing

## 2023-07-29 NOTE — PROGRESS NOTES
[facility] Internal Medicine Progress Note  Patient: Gloria Perdue 80 y.o. male   MRN: [id number]  PCP: No primary care provider on file. Unit/Bed#: [address] [place] [address] Encounter: [id number]  Date Of Visit: 07/29/23    Problem List:    Principal Problem:    Acute encephalopathy  Active Problems:    Urinary retention    Hypertension    CAD (coronary artery disease)    Abnormal CT scan    Compression fracture of vertebral column (HCC)    Hyperlipidemia    Depression with anxiety      Assessment & Plan:    Urinary retention  Assessment & Plan  Bladder scan performed 7/26/2023 showed 465 mL urine. Possibly contributed to delirium  Now improved  · KUB completed-no significant constipation  · Monitor with urinary retention protocol  · UA showed small amount of leukocytes- no evidence of infection. Urine culture-negative  · [place] catheter now in place  · Activity as tolerated    * Acute encephalopathy  Assessment & Plan  Patient presents with lethargy started on Sunday afternoon from NH per staff. Pt was found under his wheel chair on Saturday night,unwitnessed. Patient was started on Flexeril 5mg po TID prn on Saturday after the fall,taken 6 doses total so far. Toxic Encephalopathy likely due to newly started Flexeril, evidenced by lethargy, treated with CT head, frequent neuro checks, IVF, UA and lab monitoring. · CTH, C spine no acute findings  · CT C/A/P showed -  No acute traumatic injury. Bibasilar dependent atelectasis and scattered lower lobe predominant hazy pulmonary infiltrates noted which could be related to aspiration. Trace right pleural effusion. · UA clean  · Ammonia normal  · WBC normal, no bands. Afebrile. · Was on oxygen 3L initially. Does not use O2 at baseline.    · B12, folate, TSH, vitamin D unremarkable  · MRI of brain showed no signs of ischemia  Mental status improving overall, does seem to have some confusion at baseline suspect underlying early dementia  · Remains afebrile without signs and symptoms of infection. Oxygen requirement has improved, now breathing comfortably on room air. ·  Procalcitonin negative  · Antibiotics, aspiration precaution discontinued  · Continue with stage III dysphagia diet  · PT OT as tolerated  · Neurology consulted, no additional work-up recommended at this time. Agree altered mentation on presentation likely due to Flexeril. Noted with delirium overnight secondary to sundowning  · Avoid sedating medications. · Delirium precaution  · Monitor for constipation, urinary retention  · Frequent reorientation  · Melatonin nightly, attempt to maintain sleep-wake cycle  · Continue Risperdal 0.25 mg nightly for short duration to help with confusion/delirium  · Out of bed to chair, activity as tolerated -continues to require max assist  · Recommend outpatient neurology follow-up for formal memory testing  · Continue neurochecks     Compression fracture of vertebral column ([address])  Assessment & Plan  · CT showed - Age-indeterminate but more likely to be chronic compression fracture deformities of the T11 and L1 vertebrae. · Tylenol, lidocaine patch. · Monitor for back pain- continues to deny pain  · PT/OT as tolerated    Abnormal CT scan  Assessment & Plan  CT: Bibasilar dependent atelectasis and scattered lower lobe predominant hazy pulmonary infiltrates noted which could be related to aspiration  · Was prescribed Avelox 400mg po daily for 6 days for infection ,took 3 doses so far at the time of admission. Staff not sure for what infection.   Held on admission  · Initially started on ceftriaxone, subsequently discontinued  · Procal-WNL  · ST eval --continue with level 3 dysphagia diet    CAD (coronary artery disease)  Assessment & Plan  · Continue Lipitor    Hypertension  Assessment & Plan  On Losartan, Minipress at home  Held Losartan on admission due to elevated Cr initially  Patient trend noted to be improving but noted to have elevated blood pressure overnight  · Continue Minipress  · Continue losartan, will increase to 100 mg daily  · Hydralazine prn        Elevated liver enzymes-resolved as of 2023  Assessment & Plan  · Mildly elevated on admission  · AST 54  · Trend LFT    Elevated serum creatinine-resolved as of 2023  Assessment & Plan  · Cr 1.51 >1.35 > 1.11 > 1.07  · Unclear baseline  · Resolving with gentle IVF  · Continue to trend  · Monitor for retention    Depression with anxiety  Assessment & Plan  · Continue home zoloft at present    Hyperlipidemia  Assessment & Plan  · Continue statin          VTE Pharmacologic Prophylaxis:   Moderate Risk (Score 3-4) - Pharmacological DVT Prophylaxis Ordered: enoxaparin (Lovenox). Patient Centered Rounds: I performed bedside rounds with nursing staff today. Discussions with Specialists or Other Care Team Provider: yes    Education and Discussions with Family / Patient: Updated  (daughter) at bedside. Time Spent for Care: 45 minutes. More than 50% of total time spent on counseling and coordination of care as described above. Current Length of Stay: 5 day(s)  Current Patient Status: Inpatient   Certification Statement: The patient will continue to require additional inpatient hospital stay due to Monitoring of mental status blood pressure monitoring  Discharge Plan: Anticipate discharge in 24-48 hrs to rehab facility. Code Status: Level 1 - Full Code    Subjective: Discussed with patient with the help of     Sitting up in chair  Appears more alert communicative.   Able to make good conversation  Frustrated that he is not at home  Denies any back pain    Slept well overnight as per nursing    Appetite fair, denies any GI complaints or difficulty in urination      Objective:     Vitals:   Temp (24hrs), Av.8 °F (36 °C), Min:94.6 °F (34.8 °C), Max:98.2 °F (36.8 °C)    Temp:  [94.6 °F (34.8 °C)-98.2 °F (36.8 °C)] 98.2 °F (36.8 °C)  HR:  [69-84] 75  Resp:  [16-18] 16  BP: (166-177)/() 177/94  SpO2:  [95 %-97 %] 95 %  Body mass index is 26.11 kg/m². Input and Output Summary (last 24 hours): Intake/Output Summary (Last 24 hours) at 7/29/2023 0957  Last data filed at 7/28/2023 1100  Gross per 24 hour   Intake --   Output 800 ml   Net -800 ml       Physical Exam:   Physical Exam  Constitutional:       General: He is not in acute distress. HENT:      Head: Normocephalic and atraumatic. Cardiovascular:      Rate and Rhythm: Normal rate. Pulmonary:      Effort: Pulmonary effort is normal. No respiratory distress. Breath sounds: No wheezing, rhonchi or rales. Chest:      Chest wall: No tenderness. Abdominal:      General: Bowel sounds are normal. There is no distension. Palpations: Abdomen is soft. Tenderness: There is no abdominal tenderness. There is no guarding or rebound. Musculoskeletal:      Comments: Bilateral equal strength. Able to elevate lower extremities against gravity without any worsening of pain   Skin:     General: Skin is warm and dry. Findings: No rash. Neurological:      Mental Status: He is alert. Mental status is at baseline. Cranial Nerves: No cranial nerve deficit. Additional Data:     Labs:  Results from last 7 days   Lab Units 07/29/23  0513 07/28/23  0529 07/27/23  0557   WBC Thousand/uL 6.92   < > 8.24   HEMOGLOBIN g/dL 11.4*   < > 11.2*   HEMATOCRIT % 35.3*   < > 35.2*   PLATELETS Thousands/uL 216   < > 242   NEUTROS PCT %  --   --  47   LYMPHS PCT %  --   --  33   MONOS PCT %  --   --  13*   EOS PCT %  --   --  5    < > = values in this interval not displayed.      Results from last 7 days   Lab Units 07/29/23  0513 07/27/23  0557 07/26/23  0405   SODIUM mmol/L 138   < > 135   POTASSIUM mmol/L 4.0   < > 4.2   CHLORIDE mmol/L 105   < > 104   CO2 mmol/L 26   < > 23   BUN mg/dL 16   < > 20   CREATININE mg/dL 0.97   < > 1.03   ANION GAP mmol/L 7   < > 8   CALCIUM mg/dL 8.2*   < > 8.5   ALBUMIN g/dL --   --  3.2*   TOTAL BILIRUBIN mg/dL  --   --  0.68   ALK PHOS U/L  --   --  47   ALT U/L  --   --  24   AST U/L  --   --  35   GLUCOSE RANDOM mg/dL 109   < > 102    < > = values in this interval not displayed. Results from last 7 days   Lab Units 07/23/23 2008   POC GLUCOSE mg/dl 158*         Results from last 7 days   Lab Units 07/26/23  0405 07/24/23  0619   PROCALCITONIN ng/ml 0.05 0.08       Lines/Drains:  Invasive Devices     Peripheral Intravenous Line  Duration           Peripheral IV 07/27/23 Left;Ventral (anterior) Forearm 2 days          Drain  Duration           External Urinary Catheter Medium 5 days                      Imaging: Reviewed radiology reports from this admission including: chest CT scan and MRI brain    Recent Cultures (last 7 days):   Results from last 7 days   Lab Units 07/27/23  2329   URINE CULTURE  10,000-19,000 cfu/ml       Last 24 Hours Medication List:   Current Facility-Administered Medications   Medication Dose Route Frequency Provider Last Rate   • acetaminophen  325 mg Oral Q6H PRN Traci Bray MD     • acetaminophen  650 mg Oral Q6H [address] Traci Bray MD     • atorvastatin  5 mg Oral Daily LIZA Arnold     • enoxaparin  30 mg Subcutaneous Daily LIZA Arnold     • famotidine  20 mg Oral Daily Traci Bray MD     • hydrALAZINE  5 mg Intravenous Q4H PRN LIZA Arnold     • lidocaine  1 patch Topical Daily Traci Bray MD     • losartan  75 mg Oral Daily Traci Bray MD     • melatonin  3 mg Oral HS Traci Bray MD     • oxybutynin  5 mg Oral TID LIZA Arnold     • polyethylene glycol  17 g Oral Daily LIZA Arnold     • prazosin  1 mg Oral HS LIZA Arnold     • risperiDONE  0.25 mg Oral HS Traci Bray MD     • sertraline  50 mg Oral Daily Traci Bray MD          Today, Patient Was Seen By: Traci Bray MD    ** Please Note: "This note has been constructed using a voice recognition system. Therefore there may be syntax, spelling, and/or grammatical errors.  Please call if you have any questions. "**

## 2023-07-30 LAB
ANION GAP SERPL CALCULATED.3IONS-SCNC: 5 MMOL/L
BUN SERPL-MCNC: 14 MG/DL (ref 5–25)
CALCIUM SERPL-MCNC: 8.8 MG/DL (ref 8.4–10.2)
CHLORIDE SERPL-SCNC: 105 MMOL/L (ref 96–108)
CO2 SERPL-SCNC: 28 MMOL/L (ref 21–32)
CREAT SERPL-MCNC: 1.06 MG/DL (ref 0.6–1.3)
ERYTHROCYTE [DISTWIDTH] IN BLOOD BY AUTOMATED COUNT: 14.5 % (ref 11.6–15.1)
GFR SERPL CREATININE-BSD FRML MDRD: 59 ML/MIN/1.73SQ M
GLUCOSE SERPL-MCNC: 108 MG/DL (ref 65–140)
HCT VFR BLD AUTO: 38.4 % (ref 36.5–49.3)
HGB BLD-MCNC: 12.4 G/DL (ref 12–17)
MCH RBC QN AUTO: 32.1 PG (ref 26.8–34.3)
MCHC RBC AUTO-ENTMCNC: 32.3 G/DL (ref 31.4–37.4)
MCV RBC AUTO: 100 FL (ref 82–98)
PLATELET # BLD AUTO: 223 THOUSANDS/UL (ref 149–390)
PMV BLD AUTO: 12 FL (ref 8.9–12.7)
POTASSIUM SERPL-SCNC: 4.4 MMOL/L (ref 3.5–5.3)
RBC # BLD AUTO: 3.86 MILLION/UL (ref 3.88–5.62)
SODIUM SERPL-SCNC: 138 MMOL/L (ref 135–147)
WBC # BLD AUTO: 9.03 THOUSAND/UL (ref 4.31–10.16)

## 2023-07-30 PROCEDURE — 99232 SBSQ HOSP IP/OBS MODERATE 35: CPT | Performed by: INTERNAL MEDICINE

## 2023-07-30 PROCEDURE — 85027 COMPLETE CBC AUTOMATED: CPT | Performed by: INTERNAL MEDICINE

## 2023-07-30 PROCEDURE — 80048 BASIC METABOLIC PNL TOTAL CA: CPT | Performed by: INTERNAL MEDICINE

## 2023-07-30 RX ORDER — SENNOSIDES 8.6 MG
2 TABLET ORAL
Status: DISCONTINUED | OUTPATIENT
Start: 2023-07-30 | End: 2023-07-31 | Stop reason: HOSPADM

## 2023-07-30 RX ORDER — DOCUSATE SODIUM 100 MG/1
100 CAPSULE, LIQUID FILLED ORAL 2 TIMES DAILY
Status: DISCONTINUED | OUTPATIENT
Start: 2023-07-30 | End: 2023-07-31 | Stop reason: HOSPADM

## 2023-07-30 RX ADMIN — POLYETHYLENE GLYCOL 3350 17 G: 17 POWDER, FOR SOLUTION ORAL at 08:38

## 2023-07-30 RX ADMIN — RISPERIDONE 0.25 MG: 0.25 TABLET, FILM COATED ORAL at 21:03

## 2023-07-30 RX ADMIN — ACETAMINOPHEN 650 MG: 325 TABLET ORAL at 06:42

## 2023-07-30 RX ADMIN — OXYBUTYNIN CHLORIDE 5 MG: 5 TABLET ORAL at 16:24

## 2023-07-30 RX ADMIN — LIDOCAINE 1 PATCH: 700 PATCH TOPICAL at 08:38

## 2023-07-30 RX ADMIN — OXYBUTYNIN CHLORIDE 5 MG: 5 TABLET ORAL at 21:03

## 2023-07-30 RX ADMIN — Medication 3 MG: at 21:03

## 2023-07-30 RX ADMIN — SENNOSIDES 17.2 MG: 8.6 TABLET, FILM COATED ORAL at 21:03

## 2023-07-30 RX ADMIN — PRAZOSIN HYDROCHLORIDE 1 MG: 1 CAPSULE ORAL at 21:03

## 2023-07-30 RX ADMIN — ENOXAPARIN SODIUM 30 MG: 30 INJECTION SUBCUTANEOUS at 08:38

## 2023-07-30 RX ADMIN — DOCUSATE SODIUM 100 MG: 100 CAPSULE, LIQUID FILLED ORAL at 17:10

## 2023-07-30 RX ADMIN — ACETAMINOPHEN 650 MG: 325 TABLET ORAL at 17:10

## 2023-07-30 NOTE — PLAN OF CARE
Problem: Potential for Falls  Goal: Patient will remain free of falls  Description: INTERVENTIONS:  - Educate patient/family on patient safety including physical limitations  - Instruct patient to call for assistance with activity   - Consult OT/PT to assist with strengthening/mobility   - Keep Call bell within reach  - Keep bed low and locked with side rails adjusted as appropriate  - Keep care items and personal belongings within reach  - Initiate and maintain comfort rounds  - Make Fall Risk Sign visible to staff  - Offer Toileting every 2 Hours, in advance of need  - Initiate/Maintain bed alarm  - Apply yellow socks and bracelet for high fall risk patients  - Consider moving patient to room near nurses station  Outcome: Progressing     Problem: Prexisting or High Potential for Compromised Skin Integrity  Goal: Skin integrity is maintained or improved  Description: INTERVENTIONS:  - Identify patients at risk for skin breakdown  - Assess and monitor skin integrity  - Assess and monitor nutrition and hydration status  - Monitor labs   - Assess for incontinence   - Turn and reposition patient  - Assist with mobility/ambulation  - Relieve pressure over bony prominences  - Avoid friction and shearing  - Provide appropriate hygiene as needed including keeping skin clean and dry  - Evaluate need for skin moisturizer/barrier cream  - Collaborate with interdisciplinary team   - Patient/family teaching  - Consider wound care consult   Outcome: Progressing     Problem: MOBILITY - ADULT  Goal: Maintain or return to baseline ADL function  Description: INTERVENTIONS:  - Educate patient/family on patient safety including physical limitations  - Instruct patient to call for assistance with activity   - Consult OT/PT to assist with strengthening/mobility   - Keep Call bell within reach  - Keep bed low and locked with side rails adjusted as appropriate  - Keep care items and personal belongings within reach  - Initiate and maintain comfort rounds  - Make Fall Risk Sign visible to staff    - Apply yellow socks and bracelet for high fall risk patients  - Consider moving patient to room near nurses station  Outcome: Progressing  Goal: Maintains/Returns to pre admission functional level  Description: INTERVENTIONS:  - Perform BMAT or MOVE assessment daily.   - Set and communicate daily mobility goal to care team and patient/family/caregiver. - Collaborate with rehabilitation services on mobility goals if consulted  - Perform Range of Motion 2 times a day. - Reposition patient every 2 hours.   - Stand patient 3 times a day  - Out of bed to chair 3 times a day   - Out of bed for meals 3 times a day  - Out of bed for toileting  - Record patient progress and toleration of activity level   Outcome: Progressing     Problem: PAIN - ADULT  Goal: Verbalizes/displays adequate comfort level or baseline comfort level  Description: Interventions:  - Encourage patient to monitor pain and request assistance  - Assess pain using appropriate pain scale  - Administer analgesics based on type and severity of pain and evaluate response  - Implement non-pharmacological measures as appropriate and evaluate response  - Consider cultural and social influences on pain and pain management  - Notify physician/advanced practitioner if interventions unsuccessful or patient reports new pain  Outcome: Progressing     Problem: INFECTION - ADULT  Goal: Absence or prevention of progression during hospitalization  Description: INTERVENTIONS:  - Assess and monitor for signs and symptoms of infection  - Monitor lab/diagnostic results  - Monitor all insertion sites, i.e. indwelling lines  - Administer medications as ordered  - Instruct and encourage patient and family to use good hand hygiene technique    Outcome: Progressing  Goal: Absence of fever/infection during neutropenic period  Description: INTERVENTIONS:  - Monitor WBC    Outcome: Progressing     Problem: SAFETY ADULT  Goal: Patient will remain free of falls  Description: INTERVENTIONS:  - Educate patient/family on patient safety including physical limitations  - Instruct patient to call for assistance with activity   - Consult OT/PT to assist with strengthening/mobility   - Keep Call bell within reach  - Keep bed low and locked with side rails adjusted as appropriate  - Keep care items and personal belongings within reach  - Initiate and maintain comfort rounds  - Make Fall Risk Sign visible to staff  - Offer Toileting every 2 Hours, in advance of need  - Initiate/Maintain bed alarm  - Apply yellow socks and bracelet for high fall risk patients  - Consider moving patient to room near nurses station  Outcome: Progressing  Goal: Maintain or return to baseline ADL function  Description: INTERVENTIONS:  - Educate patient/family on patient safety including physical limitations  - Instruct patient to call for assistance with activity   - Consult OT/PT to assist with strengthening/mobility   - Keep Call bell within reach  - Keep bed low and locked with side rails adjusted as appropriate  - Keep care items and personal belongings within reach  - Initiate and maintain comfort rounds  - Make Fall Risk Sign visible to staff    - Apply yellow socks and bracelet for high fall risk patients  - Consider moving patient to room near nurses station  Outcome: Progressing  Goal: Maintains/Returns to pre admission functional level  Description: INTERVENTIONS:  - Perform BMAT or MOVE assessment daily.   - Set and communicate daily mobility goal to care team and patient/family/caregiver. - Collaborate with rehabilitation services on mobility goals if consulted  - Perform Range of Motion 2 times a day. - Reposition patient every 2 hours.   - Stand patient 3 times a day  - Out of bed to chair 3 times a day   - Out of bed for meals 3 times a day  - Out of bed for toileting  - Record patient progress and toleration of activity level   Outcome: Progressing     Problem: DISCHARGE PLANNING  Goal: Discharge to home or other facility with appropriate resources  Description: INTERVENTIONS:  - Identify barriers to discharge w/patient and caregiver  - Arrange for needed discharge resources and transportation as appropriate  - Identify discharge learning needs (meds, wound care, etc.)  - Arrange for interpretive services to assist at discharge as needed  - Refer to Case Management Department for coordinating discharge planning if the patient needs post-hospital services based on physician/advanced practitioner order or complex needs related to functional status, cognitive ability, or social support system  Outcome: Progressing     Problem: Knowledge Deficit  Goal: Patient/family/caregiver demonstrates understanding of disease process, treatment plan, medications, and discharge instructions  Description: Complete learning assessment and assess knowledge base. Interventions:  - Provide teaching at level of understanding  - Provide teaching via preferred learning methods  Outcome: Progressing     Problem: Nutrition/Hydration-ADULT  Goal: Nutrient/Hydration intake appropriate for improving, restoring or maintaining nutritional needs  Description: Monitor and assess patient's nutrition/hydration status for malnutrition. Collaborate with interdisciplinary team and initiate plan and interventions as ordered. Monitor patient's weight and dietary intake as ordered or per policy. Utilize nutrition screening tool and intervene as necessary. Determine patient's food preferences and provide high-protein, high-caloric foods as appropriate.      INTERVENTIONS:  - Monitor oral intake, urinary output, labs, and treatment plans  - Assess nutrition and hydration status and recommend course of action  - Evaluate amount of meals eaten  - Assist patient with eating if necessary   - Allow adequate time for meals  - Recommend/ encourage appropriate diets, oral nutritional supplements, and vitamin/mineral supplements  - Order, calculate, and assess calorie counts as needed  - Recommend, monitor, and adjust tube feedings and TPN/PPN based on assessed needs  - Assess need for intravenous fluids  - Provide specific nutrition/hydration education as appropriate  - Include patient/family/caregiver in decisions related to nutrition  Outcome: Progressing

## 2023-07-30 NOTE — PROGRESS NOTES
[facility] Internal Medicine Progress Note  Patient: Farideh Gomes 80 y.o. male   MRN: [id number]  PCP: No primary care provider on file. Unit/Bed#: [address] [place] [address] Encounter: [id number]  Date Of Visit: 07/30/23    Problem List:    Principal Problem:    Acute encephalopathy  Active Problems:    Hypertension    CAD (coronary artery disease)    Abnormal CT scan    Compression fracture of vertebral column (HCC)    Hyperlipidemia    Depression with anxiety    Urinary retention      Assessment & Plan:    * Acute encephalopathy  Assessment & Plan  Patient presented with lethargy started on Sunday afternoon from NH per staff. Pt was found under his wheel chair on Saturday night,unwitnessed. Patient was started on Flexeril 5mg po TID prn on Saturday after the fall,taken 6 doses total so far. Toxic Encephalopathy likely due to newly started Flexeril, evidenced by lethargy, treated with CT head, frequent neuro checks, IVF, UA and lab monitoring. · CTH, C spine no acute findings  · CT C/A/P showed -  No acute traumatic injury. Bibasilar dependent atelectasis and scattered lower lobe predominant hazy pulmonary infiltrates noted which could be related to aspiration. Trace right pleural effusion. · UA clean, repeat urine culture without any growth  · Ammonia normal   · WBC normal, no bands. Afebrile. · Was on oxygen 3L initially. Does not use O2 at baseline. Now improved to baseline/room air  · B12, folate, TSH, vitamin D unremarkable  · MRI of brain showed no signs of ischemia  Currently seems to be at baseline , does seem to have some confusion/sundowning, suspect underlying early dementia  · Remains afebrile without signs and symptoms of infection. Oxygen requirement has improved, now breathing comfortably on room air.   ·  Procalcitonin negative  · Antibiotics, aspiration precaution discontinued  · Continue with current stage III dysphagia diet  · PT OT as tolerated  · Neurology consulted, no additional work-up recommended at this time. Agree altered mentation on presentation likely due to Flexeril. Noted with delirium overnight secondary to sundowning  · Avoid sedating medications. · Delirium precaution  · Monitor for constipation, urinary retention  · Frequent reorientation  · Melatonin nightly, attempt to maintain sleep-wake cycle  · Continue Risperdal 0.25 mg nightly for short duration to help with confusion/delirium  · Out of bed to chair, activity as tolerated -continues to require significant assist  · Recommend outpatient neurology follow-up for formal memory testing  · Continue neurochecks     Compression fracture of vertebral column ([address])  Assessment & Plan  · CT showed - Age-indeterminate but more likely to be chronic compression fracture deformities of the T11 and L1 vertebrae. · Tylenol, lidocaine patch. · Monitor for back pain- continues to deny pain  · PT/OT as tolerated    Abnormal CT scan  Assessment & Plan  CT: Bibasilar dependent atelectasis and scattered lower lobe predominant hazy pulmonary infiltrates noted which could be related to aspiration  · Was prescribed Avelox 400mg po daily for 6 days for infection ,took 3 doses so far at the time of admission. Staff not sure for what infection.   Held on admission  · Initially started on ceftriaxone, subsequently discontinued  · Procal-WNL  · ST eval --continue with level 3 dysphagia diet    CAD (coronary artery disease)  Assessment & Plan  · Continue Lipitor    Hypertension  Assessment & Plan  On Losartan, Minipress at home  Held Losartan on admission due to elevated Cr initially  Blood pressure trend noted  · Continue Minipress  · Continue losartan, will increase to 100 mg daily  · Hydralazine prn        Elevated liver enzymes-resolved as of 7/28/2023  Assessment & Plan  · Mildly elevated on admission  · AST 54  · Trend LFT    Elevated serum creatinine-resolved as of 7/26/2023  Assessment & Plan  · Cr 1.51 >1.35 > 1.11 > 1.07  · Unclear baseline  · Resolving with gentle IVF  · Continue to trend  · Monitor for retention    Urinary retention  Assessment & Plan  Bladder scan performed 2023 showed 465 mL urine. Possibly contributed to delirium  Now improved  · KUB completed-no significant constipation  · Monitor with urinary retention protocol  · UA showed small amount of leukocytes- no evidence of infection. Urine culture-negative  · [place] catheter now in place  · Activity as tolerated    Depression with anxiety  Assessment & Plan  · Continue home zoloft at present    Hyperlipidemia  Assessment & Plan  · Continue statin          VTE Pharmacologic Prophylaxis:   Moderate Risk (Score 3-4) - Pharmacological DVT Prophylaxis Ordered: enoxaparin (Lovenox). Patient Centered Rounds: I performed bedside rounds with nursing staff today. Discussions with Specialists or Other Care Team Provider: yes    Education and Discussions with Family / Patient: Updated  (daughter) at bedside. Time Spent for Care: 45 minutes. More than 50% of total time spent on counseling and coordination of care as described above. Current Length of Stay: 6 day(s)  Current Patient Status: Inpatient   Certification Statement: The patient will continue to require additional inpatient hospital stay due to Monitoring of mental status blood pressure monitoring  Discharge Plan: Anticipate discharge in 24-48 hrs to rehab facility.     Code Status: Level 1 - Full Code    Subjective: Discussed with patient with the help of     Sitting up in chair  Had good breakfast, does not want to conduct lunch as he is not hungry but requests to have '[place]'    Denies any pain especially back pain  Denies any abdominal pain  Reports that he is feeling okay otherwise  Slept well last night    Objective:     Vitals:   Temp (24hrs), Av.9 °F (36.6 °C), Min:97.6 °F (36.4 °C), Max:98.1 °F (36.7 °C)    Temp:  [97.6 °F (36.4 °C)-98.1 °F (36.7 °C)] 97.9 °F (36.6 °C)  HR:  [69-92] 89  Resp:  [16-18] 16  BP: (134-190)/() 155/71  SpO2:  [94 %-97 %] 96 % on room air  Body mass index is 26.11 kg/m². Input and Output Summary (last 24 hours): Intake/Output Summary (Last 24 hours) at 7/30/2023 0841  Last data filed at 7/29/2023 1701  Gross per 24 hour   Intake 474 ml   Output --   Net 474 ml       Physical Exam:   Physical Exam  Constitutional:       General: He is not in acute distress. HENT:      Head: Normocephalic and atraumatic. Cardiovascular:      Rate and Rhythm: Normal rate. Pulmonary:      Effort: Pulmonary effort is normal. No respiratory distress. Breath sounds: No wheezing, rhonchi or rales. Chest:      Chest wall: No tenderness. Abdominal:      General: Bowel sounds are normal. There is no distension. Palpations: Abdomen is soft. Tenderness: There is no abdominal tenderness. There is no guarding or rebound. Musculoskeletal:      Cervical back: Neck supple. Comments: Generalized weakness   Skin:     General: Skin is warm and dry. Findings: No rash. Comments: Bilateral equal strength. Able to elevate lower extremities against gravity without any worsening of pain   Neurological:      Mental Status: He is alert. Mental status is at baseline. Cranial Nerves: No cranial nerve deficit. Additional Data:     Labs:  Results from last 7 days   Lab Units 07/30/23  0649 07/28/23  0529 07/27/23  0557   WBC Thousand/uL 9.03   < > 8.24   HEMOGLOBIN g/dL 12.4   < > 11.2*   HEMATOCRIT % 38.4   < > 35.2*   PLATELETS Thousands/uL 223   < > 242   NEUTROS PCT %  --   --  47   LYMPHS PCT %  --   --  33   MONOS PCT %  --   --  13*   EOS PCT %  --   --  5    < > = values in this interval not displayed.      Results from last 7 days   Lab Units 07/30/23  0649 07/27/23  0557 07/26/23  0405   SODIUM mmol/L 138   < > 135   POTASSIUM mmol/L 4.4   < > 4.2   CHLORIDE mmol/L 105   < > 104   CO2 mmol/L 28   < > 23   BUN mg/dL 14   < > 20   CREATININE mg/dL 1.06   < > 1.03   ANION GAP mmol/L 5   < > 8   CALCIUM mg/dL 8.8   < > 8.5   ALBUMIN g/dL  --   --  3.2*   TOTAL BILIRUBIN mg/dL  --   --  0.68   ALK PHOS U/L  --   --  47   ALT U/L  --   --  24   AST U/L  --   --  35   GLUCOSE RANDOM mg/dL 108   < > 102    < > = values in this interval not displayed.          Results from last 7 days   Lab Units 07/23/23 2008   POC GLUCOSE mg/dl 158*         Results from last 7 days   Lab Units 07/26/23  0405 07/24/23  0619   PROCALCITONIN ng/ml 0.05 0.08       Lines/Drains:  Invasive Devices     Peripheral Intravenous Line  Duration           Peripheral IV 07/27/23 Left;Ventral (anterior) Forearm 3 days          Drain  Duration           External Urinary Catheter Medium 6 days                      Imaging: Reviewed radiology reports from this admission including: chest CT scan and MRI brain    Recent Cultures (last 7 days):   Results from last 7 days   Lab Units 07/27/23  2329   URINE CULTURE  10,000-19,000 cfu/ml       Last 24 Hours Medication List:   Current Facility-Administered Medications   Medication Dose Route Frequency Provider Last Rate   • acetaminophen  325 mg Oral Q6H PRN Ishan Dangelo MD     • acetaminophen  650 mg Oral Q6H [address] Ishan Dangelo MD     • atorvastatin  5 mg Oral Daily LIZA Arnold     • enoxaparin  30 mg Subcutaneous Daily LIZA Arnold     • famotidine  20 mg Oral Daily Ishan Dangelo MD     • hydrALAZINE  5 mg Intravenous Q4H PRN LIZA Arnold     • lidocaine  1 patch Topical Daily Ishan Dangelo MD     • losartan  100 mg Oral Daily Ishan Dangelo MD     • melatonin  3 mg Oral HS Ishan Dangelo MD     • oxybutynin  5 mg Oral TID LIZA Arnold     • polyethylene glycol  17 g Oral Daily LIZA Arnold     • prazosin  1 mg Oral HS LIZA Arnold     • risperiDONE  0.25 mg Oral HS Ishan Dangelo MD     • sertraline  50 mg Oral Daily Ishan Dangelo MD          Today, Patient Was Seen By: Bert Fairchild MD    ** Please Note: "This note has been constructed using a voice recognition system. Therefore there may be syntax, spelling, and/or grammatical errors.  Please call if you have any questions. "**

## 2023-07-30 NOTE — PLAN OF CARE
Problem: Potential for Falls  Goal: Patient will remain free of falls  Description: INTERVENTIONS:  - Educate patient/family on patient safety including physical limitations  - Instruct patient to call for assistance with activity   - Consult OT/PT to assist with strengthening/mobility   - Keep Call bell within reach  - Keep bed low and locked with side rails adjusted as appropriate  - Keep care items and personal belongings within reach  - Initiate and maintain comfort rounds  - Make Fall Risk Sign visible to staff  - Offer Toileting every 2 Hours, in advance of need  - Initiate/Maintain bed alarm  - Apply yellow socks and bracelet for high fall risk patients  - Consider moving patient to room near nurses station  Outcome: Progressing     Problem: Prexisting or High Potential for Compromised Skin Integrity  Goal: Skin integrity is maintained or improved  Description: INTERVENTIONS:  - Identify patients at risk for skin breakdown  - Assess and monitor skin integrity  - Assess and monitor nutrition and hydration status  - Monitor labs   - Assess for incontinence   - Turn and reposition patient  - Assist with mobility/ambulation  - Relieve pressure over bony prominences  - Avoid friction and shearing  - Provide appropriate hygiene as needed including keeping skin clean and dry  - Evaluate need for skin moisturizer/barrier cream  - Collaborate with interdisciplinary team   - Patient/family teaching  - Consider wound care consult   Outcome: Progressing     Problem: MOBILITY - ADULT  Goal: Maintain or return to baseline ADL function  Description: INTERVENTIONS:  - Educate patient/family on patient safety including physical limitations  - Instruct patient to call for assistance with activity   - Consult OT/PT to assist with strengthening/mobility   - Keep Call bell within reach  - Keep bed low and locked with side rails adjusted as appropriate  - Keep care items and personal belongings within reach  - Initiate and maintain comfort rounds  - Make Fall Risk Sign visible to staff    - Apply yellow socks and bracelet for high fall risk patients  - Consider moving patient to room near nurses station  Outcome: Progressing  Goal: Maintains/Returns to pre admission functional level  Description: INTERVENTIONS:  - Perform BMAT or MOVE assessment daily.   - Set and communicate daily mobility goal to care team and patient/family/caregiver. - Collaborate with rehabilitation services on mobility goals if consulted  - Perform Range of Motion 4 times a day. - Reposition patient every 2 hours.   - Dangle patient 3 times a day  - Stand patient 3 times a day  - Ambulate patient 3 times a day  - Out of bed to chair 3 times a day   - Out of bed for meals 3 times a day  - Out of bed for toileting  - Record patient progress and toleration of activity level   Outcome: Progressing     Problem: PAIN - ADULT  Goal: Verbalizes/displays adequate comfort level or baseline comfort level  Description: Interventions:  - Encourage patient to monitor pain and request assistance  - Assess pain using appropriate pain scale  - Administer analgesics based on type and severity of pain and evaluate response  - Implement non-pharmacological measures as appropriate and evaluate response  - Consider cultural and social influences on pain and pain management  - Notify physician/advanced practitioner if interventions unsuccessful or patient reports new pain  Outcome: Progressing     Problem: INFECTION - ADULT  Goal: Absence or prevention of progression during hospitalization  Description: INTERVENTIONS:  - Assess and monitor for signs and symptoms of infection  - Monitor lab/diagnostic results  - Monitor all insertion sites, i.e. indwelling lines  - Administer medications as ordered  - Instruct and encourage patient and family to use good hand hygiene technique    Outcome: Progressing  Goal: Absence of fever/infection during neutropenic period  Description: INTERVENTIONS:  - Monitor WBC    Outcome: Progressing     Problem: SAFETY ADULT  Goal: Patient will remain free of falls  Description: INTERVENTIONS:  - Educate patient/family on patient safety including physical limitations  - Instruct patient to call for assistance with activity   - Consult OT/PT to assist with strengthening/mobility   - Keep Call bell within reach  - Keep bed low and locked with side rails adjusted as appropriate  - Keep care items and personal belongings within reach  - Initiate and maintain comfort rounds  - Make Fall Risk Sign visible to staff  - Offer Toileting every 2 Hours, in advance of need  - Initiate/Maintain bed alarm  - Apply yellow socks and bracelet for high fall risk patients  - Consider moving patient to room near nurses station  Outcome: Progressing  Goal: Maintain or return to baseline ADL function  Description: INTERVENTIONS:  - Educate patient/family on patient safety including physical limitations  - Instruct patient to call for assistance with activity   - Consult OT/PT to assist with strengthening/mobility   - Keep Call bell within reach  - Keep bed low and locked with side rails adjusted as appropriate  - Keep care items and personal belongings within reach  - Initiate and maintain comfort rounds  - Make Fall Risk Sign visible to staff    - Apply yellow socks and bracelet for high fall risk patients  - Consider moving patient to room near nurses station  Outcome: Progressing  Goal: Maintains/Returns to pre admission functional level  Description: INTERVENTIONS:  - Perform BMAT or MOVE assessment daily.   - Set and communicate daily mobility goal to care team and patient/family/caregiver. - Collaborate with rehabilitation services on mobility goals if consulted  - Perform Range of Motion 4 times a day. - Reposition patient every 2 hours.   - Dangle patient 3 times a day  - Stand patient 3 times a day  - Ambulate patient 3 times a day  - Out of bed to chair 3 times a day - Out of bed for meals 3 times a day  - Out of bed for toileting  - Record patient progress and toleration of activity level   Outcome: Progressing     Problem: DISCHARGE PLANNING  Goal: Discharge to home or other facility with appropriate resources  Description: INTERVENTIONS:  - Identify barriers to discharge w/patient and caregiver  - Arrange for needed discharge resources and transportation as appropriate  - Identify discharge learning needs (meds, wound care, etc.)  - Arrange for interpretive services to assist at discharge as needed  - Refer to Case Management Department for coordinating discharge planning if the patient needs post-hospital services based on physician/advanced practitioner order or complex needs related to functional status, cognitive ability, or social support system  Outcome: Progressing     Problem: Knowledge Deficit  Goal: Patient/family/caregiver demonstrates understanding of disease process, treatment plan, medications, and discharge instructions  Description: Complete learning assessment and assess knowledge base. Interventions:  - Provide teaching at level of understanding  - Provide teaching via preferred learning methods  Outcome: Progressing     Problem: Nutrition/Hydration-ADULT  Goal: Nutrient/Hydration intake appropriate for improving, restoring or maintaining nutritional needs  Description: Monitor and assess patient's nutrition/hydration status for malnutrition. Collaborate with interdisciplinary team and initiate plan and interventions as ordered. Monitor patient's weight and dietary intake as ordered or per policy. Utilize nutrition screening tool and intervene as necessary. Determine patient's food preferences and provide high-protein, high-caloric foods as appropriate.      INTERVENTIONS:  - Monitor oral intake, urinary output, labs, and treatment plans  - Assess nutrition and hydration status and recommend course of action  - Evaluate amount of meals eaten  - Assist patient with eating if necessary   - Allow adequate time for meals  - Recommend/ encourage appropriate diets, oral nutritional supplements, and vitamin/mineral supplements  - Order, calculate, and assess calorie counts as needed  - Recommend, monitor, and adjust tube feedings and TPN/PPN based on assessed needs  - Assess need for intravenous fluids  - Provide specific nutrition/hydration education as appropriate  - Include patient/family/caregiver in decisions related to nutrition  Outcome: Progressing

## 2023-07-31 VITALS
HEART RATE: 76 BPM | BODY MASS INDEX: 26.05 KG/M2 | OXYGEN SATURATION: 97 % | SYSTOLIC BLOOD PRESSURE: 169 MMHG | HEIGHT: 70 IN | WEIGHT: 182 LBS | TEMPERATURE: 97.9 F | RESPIRATION RATE: 20 BRPM | DIASTOLIC BLOOD PRESSURE: 89 MMHG

## 2023-07-31 PROBLEM — R33.9 URINARY RETENTION: Status: RESOLVED | Noted: 2023-07-27 | Resolved: 2023-07-31

## 2023-07-31 PROBLEM — G93.40 ACUTE ENCEPHALOPATHY: Status: RESOLVED | Noted: 2023-07-24 | Resolved: 2023-07-31

## 2023-07-31 PROCEDURE — 99239 HOSP IP/OBS DSCHRG MGMT >30: CPT | Performed by: INTERNAL MEDICINE

## 2023-07-31 RX ORDER — LOSARTAN POTASSIUM 100 MG/1
100 TABLET ORAL DAILY
Refills: 0
Start: 2023-08-01

## 2023-07-31 RX ORDER — LANOLIN ALCOHOL/MO/W.PET/CERES
3 CREAM (GRAM) TOPICAL
Refills: 0
Start: 2023-07-31

## 2023-07-31 RX ORDER — AMLODIPINE BESYLATE 2.5 MG/1
2.5 TABLET ORAL DAILY
Refills: 0
Start: 2023-08-01

## 2023-07-31 RX ORDER — AMLODIPINE BESYLATE 2.5 MG/1
2.5 TABLET ORAL DAILY
Status: DISCONTINUED | OUTPATIENT
Start: 2023-07-31 | End: 2023-07-31 | Stop reason: HOSPADM

## 2023-07-31 RX ORDER — ACETAMINOPHEN 325 MG/1
650 TABLET ORAL EVERY 6 HOURS PRN
Refills: 0
Start: 2023-07-31

## 2023-07-31 RX ORDER — DOCUSATE SODIUM 100 MG/1
100 CAPSULE, LIQUID FILLED ORAL 2 TIMES DAILY
Refills: 0
Start: 2023-07-31

## 2023-07-31 RX ADMIN — POLYETHYLENE GLYCOL 3350 17 G: 17 POWDER, FOR SOLUTION ORAL at 08:17

## 2023-07-31 RX ADMIN — DOCUSATE SODIUM 100 MG: 100 CAPSULE, LIQUID FILLED ORAL at 08:16

## 2023-07-31 RX ADMIN — LIDOCAINE 1 PATCH: 700 PATCH TOPICAL at 08:17

## 2023-07-31 RX ADMIN — FAMOTIDINE 20 MG: 20 TABLET ORAL at 08:16

## 2023-07-31 RX ADMIN — OXYBUTYNIN CHLORIDE 5 MG: 5 TABLET ORAL at 08:16

## 2023-07-31 RX ADMIN — ATORVASTATIN CALCIUM 5 MG: 10 TABLET, FILM COATED ORAL at 08:16

## 2023-07-31 RX ADMIN — SERTRALINE HYDROCHLORIDE 50 MG: 50 TABLET ORAL at 08:16

## 2023-07-31 RX ADMIN — AMLODIPINE BESYLATE 2.5 MG: 2.5 TABLET ORAL at 12:58

## 2023-07-31 RX ADMIN — LOSARTAN POTASSIUM 100 MG: 50 TABLET, FILM COATED ORAL at 08:16

## 2023-07-31 RX ADMIN — ENOXAPARIN SODIUM 30 MG: 30 INJECTION SUBCUTANEOUS at 08:17

## 2023-07-31 RX ADMIN — ACETAMINOPHEN 650 MG: 325 TABLET ORAL at 05:31

## 2023-07-31 RX ADMIN — ACETAMINOPHEN 650 MG: 325 TABLET ORAL at 12:57

## 2023-07-31 NOTE — [place] UNIVERSAL TRANSFER FORM
[place] UNIVERSAL TRANSFER FORM  (ALL ITEMS MUST BE COMPLETED)    1. [address] [facility] [address]    2. DATE OF TRANSFER: 7/31/2023                        TIME OF TRANSFER:  3pm    3. PATIENT NAME: David Johnson,        YOB: 1929                             GENDER: male    4. LANGUAGE:   English    5. PHYSICIAN NAME:  Bert Fairchild MD                   PHONE: [phone]    6. CODE STATUS: Level 1 - Full Code        Out of Hospital DNR Attached:     7. :                                      :  Extended Emergency Contact Information  Primary Emergency Contact: enrique sotomayor  Mobile Phone: [phone]  Relation: Son  Secondary Emergency Contact: [address]  Mobile Phone: [phone]  Relation: Daughter           Health Care Representative/Proxy:             Legal Guardian:               NAME OF:           HEALTH CARE REPRESENTATIVE/PROXY:                                         OR           LEGAL GUARDIAN, IF NOT :                                               PHONE:  (Day)           (Night)                        (Cell)    8. REASON FOR TRANSFER: (Must include brief medical history and recent changes in physical function or cognition.) STR            V/S: /89   Pulse 76   Temp 97.9 °F (36.6 °C)   Resp 20   Ht 5' 10" (1.778 m)   Wt 82.6 kg (182 lb)   SpO2 97%   BMI 26.11 kg/m²           PAIN: None     9. PRIMARY DIAGNOSIS: Acute encephalopathy      Secondary Diagnosis:         Pacemaker:       Internal Defib:           Mental Health Diagnosis (if Applicable):    10. RESTRAINTS:  None    11. RESPIRATORY NEEDS:  Room Air    12. ISOLATION/PRECAUTION:  Standard     13. ALLERGY: Patient has no known allergies. 14. SENSORY: WDL            15. SKIN CONDITION:  WDL     16. DIET:  Dys III , Thin liquids    17. IV ACCESS: None     18.  PERSONAL ITEMS SENT WITH PATIENT:     19. ATTACHED DOCUMENTS: MUST ATTACH CURRENT MEDICATION INFORMATION     20. AT RISK ALERTS:        HARM TO:     21. WEIGHT BEARING STATUS:         Left Leg: Max assist x 2        Right Leg:  Max assist x 2    22. MENTAL STATUS:  Alert     23. FUNCTION:        Walk:         Transfer: Toilet:         Feed:     24. IMMUNIZATIONS/SCREENING:     There is no immunization history on file for this patient. 25. BOWEL:  Incontinent     26. BLADDER:  Incontinent     27.  SENDING FACILITY CONTACT:                   Title:  RN        Unit: 4 [address]        Phone:  [phone] (if known):        Title:        Unit:         Phone:         FORM PREFILLED BY (if applicable)       Title:       Unit:        Phone:         FORM COMPLETED BY Bisi Sewell      Title:       Phone:

## 2023-07-31 NOTE — DISCHARGE SUMMARY
Discharge Summary - [facility] Internal Medicine    Patient Information: Rosie Martini 80 y.o. male MRN: [id number]  Unit/Bed#: [address] [place] [address] Encounter: [id number]    Discharging Physician / Practitioner: Humberto Gallardo MD  PCP: No primary care provider on file. Admission Date: 7/23/2023  Discharge Date: 07/31/23    Reason for Admission: Altered Mental Status (Patients family requested patient to come in from [facility], has been on flexeril 5mg last given at 13:00, also patient fell last night out of wheel chair and was under wheel chair, was assessed at [facility] and not sent in )      Discharge Diagnoses:     Principal Problem (Resolved):    Acute encephalopathy  Active Problems:    Hypertension    CAD (coronary artery disease)    Abnormal CT scan    Compression fracture of vertebral column ([address])    Hyperlipidemia    Depression with anxiety  Resolved Problems:    Elevated serum creatinine    Elevated liver enzymes    Urinary retention        * Acute encephalopathy-resolved as of 7/31/2023  Assessment & Plan  Patient presented with lethargy started on Sunday afternoon from NH per staff. Pt was found under his wheel chair on Saturday night,unwitnessed. Patient was started on Flexeril 5mg po TID prn on Saturday after the fall,taken 6 doses total so far. Toxic Encephalopathy likely due to newly started Flexeril, evidenced by lethargy, treated with CT head, frequent neuro checks, IVF, UA and lab monitoring. · CTH, C spine no acute findings  · CT C/A/P showed -  No acute traumatic injury. Bibasilar dependent atelectasis and scattered lower lobe predominant hazy pulmonary infiltrates noted which could be related to aspiration. Trace right pleural effusion. · UA clean, repeat urine culture without any growth  · Ammonia normal   · WBC normal, no bands. Afebrile. · Was on oxygen 3L initially. Does not use O2 at baseline.   Now improved to baseline/room air  · B12, folate, TSH, vitamin D unremarkable  · MRI of brain showed no signs of ischemia  Currently seems to be at baseline , does seem to have some confusion/sundowning, suspect underlying early dementia  · Remains afebrile without signs and symptoms of infection. Oxygen requirement has improved, now breathing comfortably on room air. ·  Procalcitonin negative  · Aspiration precaution  · Continue with current stage III dysphagia diet  · PT OT as tolerated  · Neurology consulted, no additional work-up recommended at this time. Agree altered mentation on presentation likely due to Flexeril. Noted with delirium overnight secondary to sundowning  · Avoid sedating medications. · Delirium precaution  · Monitor for constipation, urinary retention  · Frequent reorientation  · Melatonin nightly, attempt to maintain sleep-wake cycle  · Received Risperdal 0.25 mg nightly for short duration to help with confusion/delirium good response, will discontinue at present  · Out of bed to chair, activity as tolerated -continues to require significant assist  · Recommend outpatient neurology follow-up for formal memory testing    Compression fracture of vertebral column (HCC)  Assessment & Plan  CT showed - Age-indeterminate but more likely to be chronic compression fracture deformities of the T11 and L1 vertebrae. Denies pain. No tenderness on exam.  · Tylenol, lidocaine patch. · Monitor for back pain- continues to deny pain  · PT/OT as tolerated    Abnormal CT scan  Assessment & Plan  CT: Bibasilar dependent atelectasis and scattered lower lobe predominant hazy pulmonary infiltrates noted which could be related to aspiration  · Was prescribed Avelox 400mg po daily for 6 days for infection ,took 3 doses so far at the time of admission. Staff not sure for what infection.   Held on admission  · Initially started on ceftriaxone, subsequently discontinued  · Procal-WNL  · ST eval --continue with level 3 dysphagia diet  Aspiration precaution  Monitor clinically  Follow-up imaging as outpatient    CAD (coronary artery disease)  Assessment & Plan  · Continue Lipitor    Hypertension  Assessment & Plan  On Losartan, Minipress at home  Held Losartan on admission due to elevated Cr initially  Blood pressure trend noted  · Continue Minipress  · Continue losartan, increased to 100 mg daily  · Amlodipine 2.5 noted  · Monitor blood pressure, verified with manual measurement        Elevated liver enzymes-resolved as of 7/28/2023  Assessment & Plan  · Mildly elevated on admission  · AST 54  · CK level normal  · Resolved    Elevated serum creatinine-resolved as of 7/26/2023  Assessment & Plan  Cr 1.51 >1.35 > 1.11 > 1.07  · Unclear baseline  · Improved with gentle IVF  · Continue to trend  · Monitor for retention    Depression with anxiety  Assessment & Plan  · Continue home zoloft at present    Hyperlipidemia  Assessment & Plan  · Continue statin    Urinary retention-resolved as of 7/31/2023  Assessment & Plan  Bladder scan performed 7/26/2023 showed 465 mL urine. Possibly contributed to delirium  Now improved with increase mobility  · KUB completed-no significant constipation  · Monitor with urinary retention protocol  · UA showed small amount of leukocytes- no evidence of infection. Urine culture-negative  · [place] catheter now in place  · Activity as tolerated      Consultations During Hospital Stay:  IP CONSULT TO NEUROLOGY    Procedures Performed:     · none    Significant Findings:     · Refer to hospital course and above listed diagnosis related plan for details    Imaging while in hospital:    XR abdomen 1 view kub    Result Date: 7/28/2023  Narrative: ABDOMEN INDICATION:   constipation. COMPARISON:  None VIEWS:  AP supine FINDINGS: There is a nonobstructive bowel gas pattern. No discernible free air on this supine study. Upright or left lateral decubitus imaging is more sensitive to detect subtle free air in the appropriate setting.  No pathologic calcifications or soft tissue masses. Visualized lung bases are clear. Visualized osseous structures are unremarkable for the patient's age. Impression: No significant stool burden. Workstation performed: [id number]     MRI brain wo contrast    Result Date: 7/24/2023  Narrative: MRI BRAIN WITHOUT CONTRAST INDICATION: encephalopathy, AMS. COMPARISON:   None. TECHNIQUE:  Multiplanar, multisequence imaging of the brain was performed. IMAGE QUALITY:  Diagnostic. FINDINGS: BRAIN PARENCHYMA:  There is no discrete mass, mass effect or midline shift. There is no intracranial hemorrhage. There is no evidence of acute infarction and diffusion imaging is unremarkable. Small scattered hyperintensities on T2/FLAIR imaging are noted in the periventricular and subcortical white matter demonstrating an appearance that is statistically most likely to represent moderate microangiopathic change. 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 VENTRICLES:  Enlargement of ventricles and extra-axial CSF spaces, out of proportion to the patient's age most consistent with cerebral and cerebellar atrophy. SELLA AND PITUITARY GLAND:  Normal. ORBITS:  Normal. PARANASAL SINUSES:  Normal. VASCULATURE:  Evaluation of the major intracranial vasculature demonstrates appropriate flow voids. CALVARIUM AND SKULL BASE:  Normal. EXTRACRANIAL SOFT TISSUES:  Normal.     Impression: 1. No MR evidence of acute ischemia. 2. Atrophy and chronic microvascular changes. Workstation performed: [id number]     CT chest abdomen pelvis w contrast    Result Date: 7/24/2023  Narrative: CT CHEST, ABDOMEN AND PELVIS WITH IV CONTRAST INDICATION:   trauma. COMPARISON:  None. TECHNIQUE: CT examination of the chest, abdomen and pelvis was performed. Multiplanar 2D reformatted images were created from the source data.  This examination, like all CT scans performed in the [facility], was performed utilizing techniques to minimize radiation dose exposure, including the use of iterative reconstruction and automated exposure control. Radiation dose length product (DLP) for this visit:  984 mGy-cm IV Contrast:  100 mL of iohexol (OMNIPAQUE) Enteric Contrast: Enteric contrast was administered. FINDINGS: CHEST LUNGS: Central airways are patent. There is no tracheal or endobronchial lesion. Bibasilar dependent atelectasis and scattered lower lobe predominant hazy pulmonary infiltrates noted which could be related to aspiration. Attention on follow-up examination recommended. No suspicious pulmonary parenchymal mass identified in the aerated portions of the lung parenchyma. PLEURA: No pneumothorax. Trace right pleural effusion. HEART/GREAT VESSELS: Heart is unremarkable for patient's age. No pericardial effusion. No thoracic aortic aneurysm no dissection. No evidence of acute vascular injury. Visualized proximal thoracic great vessels are patent with normal course and caliber. Moderate scattered calcific atherosclerosis noted. MEDIASTINUM AND WAGNER: No mediastinal mass/hematoma or mediastinal/hilar lymphadenopathy by size criteria. Visualized thyroid glands appear grossly unremarkable. Esophagus is normal in course and caliber. No significant prevertebral soft tissue swelling or hematoma/mass. CHEST WALL AND LOWER NECK:  Unremarkable. ABDOMEN LIVER/BILIARY TREE:  Unremarkable. GALLBLADDER: Large calcified gallstone within the gallbladder, without abnormal wall thickening or pericholecystic inflammatory changes. SPLEEN:  Unremarkable. PANCREAS:  Unremarkable. ADRENAL GLANDS:  Unremarkable. KIDNEYS/URETERS: Kidneys are normal in size and position. No evidence of acute injury. Small cysts and too small to characterize hypodensities noted in the midpole of the right kidney and upper pole of the left kidney. No suspicious solid renal mass, nephrolithiasis, hydronephrosis, or perinephric fluid collection/inflammatory stranding bilaterally.  STOMACH AND BOWEL: Stomach is contracted limiting evaluation. The small and large bowel loops appear normal in course and caliber without obstruction or inflammation noted. Terminal ileum appear grossly unremarkable. Appendix is not definitively identified but no significant pericecal inflammatory changes. Sigmoid diverticulosis without evidence of acute diverticulitis. APPENDIX:  No findings to suggest appendicitis. ABDOMINOPELVIC CAVITY:  No ascites or loculated fluid collection. Nonspecific dense calcification along the right lower abdominal mesentery noted of uncertain etiology. Mariea Show No pneumoperitoneum. No lymphadenopathy. VESSELS:  Atherosclerotic changes are present. No evidence of aneurysm. PELVIS REPRODUCTIVE ORGANS:  Unremarkable for patient's age. URINARY BLADDER:  Unremarkable. ABDOMINAL WALL/INGUINAL REGIONS: Fat-containing left lateral lower abdominal wall hernia. No inguinal hernias noted. No subcutaneous mass/hematoma or fluid collections. . OSSEOUS STRUCTURES: Age-indeterminate but favored to represent chronic compression deformity of the L1 vertebral body with greater than 50% loss of vertebral body height is seen. No significant posterior bony retropulsion or involvement of the posterior spinal elements noted. Age-indeterminate but likely chronic mild superior endplate L45 compression deformity with minimal loss of vertebral body height is also seen without posterior bony retropulsion or posterior spinal element involvement. There is otherwise no acute osseous fracture or traumatic malalignment. Multilevel degenerative changes of the thoracolumbar spine and bilateral hip joints. No destructive bone lesions identified. Mild S-shaped thoracolumbar scoliosis. Impression: 1. No evidence of acute visceral/vascular injury in the thorax, abdomen, or pelvis. 2.  Bibasilar dependent atelectasis and scattered lower lobe predominant hazy pulmonary infiltrates noted which could be related to aspiration. Attention on follow-up examination recommended. 3. Trace right pleural effusion. 4.  Calcific atherosclerosis. 5.  Cholelithiasis without evidence for acute cholecystitis or significant biliary ductal dilatation. 6.  Fat-containing left lateral lower abdominal wall hernia. 7.  Age-indeterminate but more likely to be chronic compression fracture deformities of the T11 and L1 vertebrae as described above. Recommend clinical correlation with physical exam findings for point tenderness in these areas to exclude superimposed acute injury. 8.  Other ancillary findings detailed above. Workstation performed: [id number]     CT cervical spine without contrast    Result Date: 7/23/2023  Narrative: CT CERVICAL SPINE - WITHOUT CONTRAST INDICATION:   Neck trauma (Age >= 65y) trauma. COMPARISON:  None. TECHNIQUE:  CT examination of the cervical spine was performed without intravenous contrast.  Contiguous axial images were obtained. Multiplanar 2D reformatted images were created from the source data. Radiation dose length product (DLP) for this visit:  593 mGy-cm . This examination, like all CT scans performed in the [facility], was performed utilizing techniques to minimize radiation dose exposure, including the use of iterative reconstruction and automated exposure control. IMAGE QUALITY:  Diagnostic. FINDINGS: ALIGNMENT:  Normal alignment of the cervical spine. No subluxation. VERTEBRAE:  No acute cervical spine fracture. Vertebral body heights are preserved. DEGENERATIVE CHANGES:  Moderate multilevel cervical degenerative changes are noted. No critical central canal stenosis. PREVERTEBRAL AND PARASPINAL SOFT TISSUES: Unremarkable THORACIC INLET:  Normal.     Impression: Multilevel degenerative changes without acute cervical spine fracture or traumatic malalignment. Workstation performed: [id number]     CT head without contrast    Result Date: 7/23/2023  Narrative: CT BRAIN - WITHOUT CONTRAST INDICATION:   trauma/ms changes. COMPARISON:  None. TECHNIQUE:  CT examination of the brain was performed. Multiplanar 2D reformatted images were created from the source data. Radiation dose length product (DLP) for this visit:  993 mGy-cm . This examination, like all CT scans performed in the [facility], was performed utilizing techniques to minimize radiation dose exposure, including the use of iterative reconstruction and automated exposure control. IMAGE QUALITY:  Diagnostic. FINDINGS: PARENCHYMA: Decreased attenuation is noted in periventricular and subcortical white matter demonstrating an appearance that is statistically most likely to represent moderate microangiopathic change. Hypodense areas in the bilateral basal ganglia and right caudate likely represent sequelae of lacunar infarcts. No CT signs of acute large vascular territory transcortical infarction. If clinical concern for acute ischemia, recommend more sensitive MRI brain for better evaluation. No intracranial mass, mass effect or midline shift. No acute parenchymal hemorrhage. VENTRICLES AND EXTRA-AXIAL SPACES: Severe central greater than cortical volume loss/atrophy. Basilar cisterns appear patent and unremarkable. VISUALIZED ORBITS: Normal visualized orbits. PARANASAL SINUSES: Small mucous retention cyst in the left maxillary sinus. The remaining paranasal sinuses and mastoid air cells appear well aerated and clear without air-fluid levels. Andrzej Piety CALVARIUM AND EXTRACRANIAL SOFT TISSUES: Bony calvarium and temporomandibular joints are intact. .     Impression: No acute intracranial hemorrhage or depressed calvarial fracture. Senescent changes as described above.  Workstation performed: [id number]       Incidental Findings:   · CT scan finding as above    Test Results Pending at Discharge (will require follow up):   · As per After Visit Summary     Outpatient Tests Requested:  · None    Complications:  Refer to hospital course and above listed diagnosis related plan, if any    [facility] Course: As per HPI  Rosie Martini is a 80 y.o. male with history of hypertension, dyslipidemia, depression/anxiety, CAD patient who originally presented to the hospital on 7/23/2023 due to lethargy noted at skilled nursing facility. Patient was recently hospitalized with [facility] and noted to have change in mental status. Patient was noted to have increasing somnolence. Patient also had fallen at night prior out of the wheelchair. Due to change in mental status patient was referred to ED for further evaluation. Patient was noted to be lethargic but afebrile, blood pressure was stable, patient was noted to be requiring 3 L of supplemental oxygen. Work-up revealed CT scan finding as above with bibasilar atelectasis versus infiltrate concerning for aspiration pneumonia. Compression fracture noted likely chronic. Patient was also started on Flexeril recently. Patient was subsequently admitted. Patient was started on IV antibiotics pending clinical course. Flexeril was held. Mental status was monitored. Patient remained afebrile and mental status gradually improved. Patient denied any symptoms including chest pain, shortness of breath, cough, any back discomfort or  or GI complaint during hospitalization. MRI of the brain was done to rule out other etiology which was unremarkable. Patient was seen by neurology, impression was possible adverse reaction to Flexeril with possible underlying dementia for which outpatient work-up was recommended. Patient remained afebrile, calcitonin was negative. Antibiotics were discontinued. Patient was also seen by speech therapy with concerns of aspiration and recommendation were followed. Oxygenation gradually improved. Patient's mental status improved during the hospitalization though noted to have evidence of delirium during nighttime/sundowning .   Patient was continued on delirium precautions, also started on Risperdal 0.25 nightly with good response. Blood pressure trend was noted during hospitalization, noted to be elevated. Losartan was increased to 100 mg daily and amlodipine 2.5 mg was added. Patient currently remains at baseline mental status, evaluated by physical therapy and was recommended rehab. Please see above list of diagnoses and related plan for additional information. Condition at Discharge: stable     Discharge Day Visit / Exam:     Subjective: Comfortable lying in bed  Does not offer any complaint  Medicating appropriately with use of  service  Able to recognize us from prior interactions   Reports that he had good night  Denies any symptoms    Discussed regarding need for physical therapy and rehabilitation    Vitals: Blood Pressure: 169/89 (07/31/23 0900)  Pulse: 76 (07/31/23 0900)  Temperature: 97.9 °F (36.6 °C) (07/31/23 0900)  Temp Source: Axillary (07/30/23 2211)  Respirations: 20 (07/31/23 0900)  Height: 5' 10" (177.8 cm) (07/25/23 0955)  Weight - Scale: 82.6 kg (182 lb) (07/25/23 0954)  SpO2: 97 % (07/31/23 0900)  Exam:   Physical Exam  Constitutional:       General: He is not in acute distress. Comments: Elderly, frail, pleasant   HENT:      Head: Normocephalic and atraumatic. Cardiovascular:      Rate and Rhythm: Normal rate. Pulmonary:      Effort: Pulmonary effort is normal. No respiratory distress. Breath sounds: No wheezing, rhonchi or rales. Comments: Diminished bilaterally, clear  Chest:      Chest wall: No tenderness. Abdominal:      General: Bowel sounds are normal. There is no distension. Palpations: Abdomen is soft. Tenderness: There is no abdominal tenderness. There is no guarding or rebound. Musculoskeletal:      Right lower leg: No edema. Left lower leg: No edema. Skin:     General: Skin is warm and dry. Findings: No rash. Neurological:      General: No focal deficit present. Mental Status: He is alert. Mental status is at baseline. Cranial Nerves: No cranial nerve deficit. Comments: Generalized weakness  Moving all extremities spontaneously and against gravity         Discharge instructions/Information to patient and family:(Discharge Medications and Follow up):   See after visit summary for information provided to patient and family. Provisions for Follow-Up Care:  See after visit summary for information related to follow-up care and any pertinent home health orders. Disposition: Short-term rehab at [address] [place] [address]. [address] Readmission:  No     Discharge Statement:  I spent 45 minutes discharging the patient. This time was spent on the day of discharge. I had direct contact with the patient on the day of discharge. Greater than 50% of the total time was spent examining patient, answering all patient questions, arranging and discussing plan of care with patient as well as directly providing post-discharge instructions. Additional time then spent on discharge activities. Discussed incidental finding and follow-up plan with the daughter over the phone    Discharge Medications:  See after visit summary for reconciled discharge medications provided to patient and family. ** Please Note: "This note has been constructed using a voice recognition system. Therefore there may be syntax, spelling, and/or grammatical errors.  Please call if you have any questions. "**

## 2023-07-31 NOTE — INCIDENTAL FINDINGS
The following findings require follow up:  Radiographic finding   Finding:   CT scan on the admission revealed    1.  Bibasilar dependent atelectasis and scattered lower lobe predominant hazy pulmonary infiltrates noted which could be related to aspiration. Attention on follow-up examination recommended. 2.  Cholelithiasis without evidence for acute cholecystitis or significant biliary ductal dilatation. 3.  Fat-containing left lateral lower abdominal wall hernia. 4.  Age-indeterminate but more likely to be chronic compression fracture deformities of the T11 and L1 vertebrae .     Follow up required: Yes, as needed  Repeat CT scan of the chest in 1 to 2 months  As needed follow-up for cholelithiasis and abdominal wall hernia   Follow up should be done within 2 week(s)    Please notify the following clinician to assist with the follow up:   PCP

## 2023-07-31 NOTE — NURSING NOTE
The patient discharged to [facility] via stretcher transport. AVS sent with the transport.

## 2023-07-31 NOTE — CASE MANAGEMENT
Case Management Discharge Planning Note    Patient name Rogelio Bloom  Location [address] [facility]-* MRN [id number]  : 1929 Date 2023       Current Admission Date: 2023  Current Admission Diagnosis:Acute encephalopathy   Patient Active Problem List    Diagnosis Date Noted   • Urinary retention 2023   • Hypertension 2023   • Hyperlipidemia 2023   • Acute encephalopathy 2023   • Depression with anxiety 2023   • CAD (coronary artery disease) 2023   • Abnormal CT scan 2023   • Compression fracture of vertebral column ([address]) 2023      LOS (days): 7  Geometric Mean LOS (GMLOS) (days): 5.20  Days to GMLOS:-2.3     OBJECTIVE:  Risk of Unplanned Readmission Score: 12.47         Current admission status: Inpatient   Preferred Pharmacy: No Pharmacies Listed  Primary Care Provider: No primary care provider on file. Primary Insurance: [address] [facility] RE[facility]  Secondary Insurance: [address]    DISCHARGE DETAILS:    Other Referral/Resources/Interventions Provided:  Interventions: Transportation  Referral Comments: CM requested [facility] transportation via [employer] for 1500. Dr. Griselda Herrlich and Susana at [facility] made aware. CM called and spoke with patient's dtr Amna to inform about discharge and that CM to set up transportation for 1500, Amna stated she will meet him at [facility] when he gets there.      Treatment Team Recommendation: Short Term Rehab  Discharge Destination Plan[de-identified] Short Term Rehab [facility])  Transport at Discharge : [facility]  Dispatcher Contacted: Yes  Number/Name of Dispatcher: [employer]     ETA of Transport (Date): 23  ETA of Transport (Time): [address] Name, [address] [facility] : [place] [facility]  Receiving Facility/Agency Phone Number: [phone]  Facility/Agency Fax Number: [phone]

## 2023-08-01 NOTE — UTILIZATION REVIEW
NOTIFICATION OF ADMISSION DISCHARGE   This is a Notification of Discharge from [address] [facility]. Please be advised that this patient has been discharge from our facility. Below you will find the admission and discharge date and time including the patient’s disposition. UTILIZATION REVIEW CONTACT:  Reggie Andrews  Utilization   Network Utilization Review Department  Phone: [phone] x carefully listen to the prompts. All voicemails are confidential.  Email: [email or web address]     ADMISSION INFORMATION  PRESENTATION DATE: 7/23/2023  8:07 PM  OBERVATION ADMISSION DATE:   INPATIENT ADMISSION DATE: 7/24/23 12:27 AM   DISCHARGE DATE: 7/31/2023  3:14 PM   DISPOSITION:Released to SNF/TCU/SNU Facility    IMPORTANT INFORMATION:  Send all requests for admission clinical reviews, approved or denied determinations and any other requests to dedicated fax number below belonging to the campus where the patient is receiving treatment.  List of dedicated fax numbers:  [place] DENIALS (Administrative/Medical Necessity) [phone] [address] [facility], [place]) (Maternity/NICU/Pediatrics) [phone]   [facility] [phone]   [facility] [phone] [address] [facility] [phone]   [address] [place] [address] [phone]   [facility] [phone]   [address] [facility] [address] [facility] [phone]   [address] [facility] [phone] [address] [facility] [phone] [address] [facility] [address] [phone]

## 2023-09-08 ENCOUNTER — TELEPHONE (OUTPATIENT)
Dept: NEUROLOGY | Facility: CLINIC | Age: 88
End: 2023-09-08

## 2023-09-08 NOTE — TELEPHONE ENCOUNTER
Scheduled with Solange Pennington, 12/22/2023, 3:15pm, placed on the waiting list. [phone], EXT # 0, TRANSFERRED TO [facility]URSE [facility]/ DOMINIC STERLING/ Acute encephalopathy    [place]- FACILITY- 7/31/23    Discharging Facility Name and Phone Number  [address]  Name, [address] [facility]  Receiving Facility/Agency Phone  Number  [phone], EXT # 0, TRANSFERRED TO NURSE STATION SLAVAness Kam will need follow up in 8-10 weeks with general attending or advance practitioner.  He will not require outpatient neurological testing.

## 2023-09-10 ENCOUNTER — APPOINTMENT (EMERGENCY)
Dept: RADIOLOGY | Facility: HOSPITAL | Age: 88
DRG: 871 | End: 2023-09-10
Payer: COMMERCIAL

## 2023-09-10 ENCOUNTER — HOSPITAL ENCOUNTER (INPATIENT)
Facility: HOSPITAL | Age: 88
LOS: 3 days | Discharge: NURSING, REHAB OR LONG-TERM CARE | DRG: 871 | End: 2023-09-14
Attending: EMERGENCY MEDICINE | Admitting: INTERNAL MEDICINE
Payer: COMMERCIAL

## 2023-09-10 DIAGNOSIS — I10 PRIMARY HYPERTENSION: ICD-10-CM

## 2023-09-10 DIAGNOSIS — R26.2 AMBULATORY DYSFUNCTION: ICD-10-CM

## 2023-09-10 DIAGNOSIS — R41.82 ALTERED MENTAL STATUS: Primary | ICD-10-CM

## 2023-09-10 DIAGNOSIS — I47.29 NSVT (NONSUSTAINED VENTRICULAR TACHYCARDIA) (HCC): ICD-10-CM

## 2023-09-10 DIAGNOSIS — R94.31 ST SEGMENT CHANGES ON ELECTROCARDIOGRAM: ICD-10-CM

## 2023-09-10 DIAGNOSIS — K52.9 COLITIS: ICD-10-CM

## 2023-09-10 LAB
2HR DELTA HS TROPONIN: -2 NG/L
ALBUMIN SERPL BCP-MCNC: 4 G/DL (ref 3.5–5)
ALP SERPL-CCNC: 65 U/L (ref 34–104)
ALT SERPL W P-5'-P-CCNC: 16 U/L (ref 7–52)
ANION GAP SERPL CALCULATED.3IONS-SCNC: 12 MMOL/L
AST SERPL W P-5'-P-CCNC: 22 U/L (ref 13–39)
BASOPHILS # BLD AUTO: 0.13 THOUSANDS/ÂΜL (ref 0–0.1)
BASOPHILS NFR BLD AUTO: 1 % (ref 0–1)
BILIRUB SERPL-MCNC: 0.69 MG/DL (ref 0.2–1)
BILIRUB UR QL STRIP: NEGATIVE
BUN SERPL-MCNC: 23 MG/DL (ref 5–25)
CALCIUM SERPL-MCNC: 8.9 MG/DL (ref 8.4–10.2)
CARDIAC TROPONIN I PNL SERPL HS: 15 NG/L
CARDIAC TROPONIN I PNL SERPL HS: 17 NG/L
CHLORIDE SERPL-SCNC: 101 MMOL/L (ref 96–108)
CLARITY UR: CLEAR
CO2 SERPL-SCNC: 22 MMOL/L (ref 21–32)
COLOR UR: YELLOW
CREAT SERPL-MCNC: 1.31 MG/DL (ref 0.6–1.3)
EOSINOPHIL # BLD AUTO: 0.14 THOUSAND/ÂΜL (ref 0–0.61)
EOSINOPHIL NFR BLD AUTO: 1 % (ref 0–6)
ERYTHROCYTE [DISTWIDTH] IN BLOOD BY AUTOMATED COUNT: 14.5 % (ref 11.6–15.1)
GFR SERPL CREATININE-BSD FRML MDRD: 46 ML/MIN/1.73SQ M
GLUCOSE SERPL-MCNC: 163 MG/DL (ref 65–140)
GLUCOSE SERPL-MCNC: 189 MG/DL (ref 65–140)
GLUCOSE UR STRIP-MCNC: NEGATIVE MG/DL
HCT VFR BLD AUTO: 40.9 % (ref 36.5–49.3)
HGB BLD-MCNC: 12.9 G/DL (ref 12–17)
HGB UR QL STRIP.AUTO: NEGATIVE
IMM GRANULOCYTES # BLD AUTO: 0.16 THOUSAND/UL (ref 0–0.2)
IMM GRANULOCYTES NFR BLD AUTO: 1 % (ref 0–2)
KETONES UR STRIP-MCNC: NEGATIVE MG/DL
LEUKOCYTE ESTERASE UR QL STRIP: NEGATIVE
LYMPHOCYTES # BLD AUTO: 2.46 THOUSANDS/ÂΜL (ref 0.6–4.47)
LYMPHOCYTES NFR BLD AUTO: 15 % (ref 14–44)
MAGNESIUM SERPL-MCNC: 1.9 MG/DL (ref 1.9–2.7)
MCH RBC QN AUTO: 32.5 PG (ref 26.8–34.3)
MCHC RBC AUTO-ENTMCNC: 31.5 G/DL (ref 31.4–37.4)
MCV RBC AUTO: 103 FL (ref 82–98)
MONOCYTES # BLD AUTO: 0.68 THOUSAND/ÂΜL (ref 0.17–1.22)
MONOCYTES NFR BLD AUTO: 4 % (ref 4–12)
NEUTROPHILS # BLD AUTO: 13.03 THOUSANDS/ÂΜL (ref 1.85–7.62)
NEUTS SEG NFR BLD AUTO: 78 % (ref 43–75)
NITRITE UR QL STRIP: NEGATIVE
NRBC BLD AUTO-RTO: 0 /100 WBCS
PH UR STRIP.AUTO: 5.5 [PH]
PLATELET # BLD AUTO: 230 THOUSANDS/UL (ref 149–390)
PMV BLD AUTO: 12.2 FL (ref 8.9–12.7)
POTASSIUM SERPL-SCNC: 4.4 MMOL/L (ref 3.5–5.3)
PROT SERPL-MCNC: 6.9 G/DL (ref 6.4–8.4)
PROT UR STRIP-MCNC: ABNORMAL MG/DL
RBC # BLD AUTO: 3.97 MILLION/UL (ref 3.88–5.62)
SODIUM SERPL-SCNC: 135 MMOL/L (ref 135–147)
SP GR UR STRIP.AUTO: 1.01 (ref 1–1.03)
UROBILINOGEN UR QL STRIP.AUTO: 2 E.U./DL
WBC # BLD AUTO: 16.6 THOUSAND/UL (ref 4.31–10.16)

## 2023-09-10 PROCEDURE — 70450 CT HEAD/BRAIN W/O DYE: CPT

## 2023-09-10 PROCEDURE — 84484 ASSAY OF TROPONIN QUANT: CPT | Performed by: EMERGENCY MEDICINE

## 2023-09-10 PROCEDURE — 85025 COMPLETE CBC W/AUTO DIFF WBC: CPT | Performed by: EMERGENCY MEDICINE

## 2023-09-10 PROCEDURE — 99285 EMERGENCY DEPT VISIT HI MDM: CPT | Performed by: EMERGENCY MEDICINE

## 2023-09-10 PROCEDURE — G1004 CDSM NDSC: HCPCS

## 2023-09-10 PROCEDURE — 36415 COLL VENOUS BLD VENIPUNCTURE: CPT | Performed by: EMERGENCY MEDICINE

## 2023-09-10 PROCEDURE — 93005 ELECTROCARDIOGRAM TRACING: CPT

## 2023-09-10 PROCEDURE — 99285 EMERGENCY DEPT VISIT HI MDM: CPT

## 2023-09-10 PROCEDURE — 83735 ASSAY OF MAGNESIUM: CPT | Performed by: EMERGENCY MEDICINE

## 2023-09-10 PROCEDURE — 82948 REAGENT STRIP/BLOOD GLUCOSE: CPT

## 2023-09-10 PROCEDURE — 80307 DRUG TEST PRSMV CHEM ANLYZR: CPT

## 2023-09-10 PROCEDURE — 80053 COMPREHEN METABOLIC PANEL: CPT | Performed by: EMERGENCY MEDICINE

## 2023-09-10 PROCEDURE — 87081 CULTURE SCREEN ONLY: CPT | Performed by: FAMILY MEDICINE

## 2023-09-10 PROCEDURE — 87505 NFCT AGENT DETECTION GI: CPT | Performed by: EMERGENCY MEDICINE

## 2023-09-10 PROCEDURE — 81001 URINALYSIS AUTO W/SCOPE: CPT | Performed by: EMERGENCY MEDICINE

## 2023-09-10 PROCEDURE — 74177 CT ABD & PELVIS W/CONTRAST: CPT

## 2023-09-10 PROCEDURE — 71045 X-RAY EXAM CHEST 1 VIEW: CPT

## 2023-09-10 PROCEDURE — 87493 C DIFF AMPLIFIED PROBE: CPT | Performed by: EMERGENCY MEDICINE

## 2023-09-10 PROCEDURE — 96360 HYDRATION IV INFUSION INIT: CPT

## 2023-09-10 PROCEDURE — 0241U HB NFCT DS VIR RESP RNA 4 TRGT: CPT

## 2023-09-10 RX ORDER — METRONIDAZOLE 500 MG/100ML
500 INJECTION, SOLUTION INTRAVENOUS EVERY 8 HOURS
Status: DISCONTINUED | OUTPATIENT
Start: 2023-09-10 | End: 2023-09-10

## 2023-09-10 RX ORDER — ENOXAPARIN SODIUM 100 MG/ML
40 INJECTION SUBCUTANEOUS DAILY
Status: DISCONTINUED | OUTPATIENT
Start: 2023-09-11 | End: 2023-09-14 | Stop reason: HOSPADM

## 2023-09-10 RX ORDER — LANOLIN ALCOHOL/MO/W.PET/CERES
3 CREAM (GRAM) TOPICAL
Status: DISCONTINUED | OUTPATIENT
Start: 2023-09-10 | End: 2023-09-14 | Stop reason: HOSPADM

## 2023-09-10 RX ORDER — METRONIDAZOLE 500 MG/100ML
500 INJECTION, SOLUTION INTRAVENOUS EVERY 8 HOURS
Status: DISCONTINUED | OUTPATIENT
Start: 2023-09-11 | End: 2023-09-14

## 2023-09-10 RX ORDER — LEVOFLOXACIN 5 MG/ML
750 INJECTION, SOLUTION INTRAVENOUS ONCE
Status: DISCONTINUED | OUTPATIENT
Start: 2023-09-10 | End: 2023-09-10

## 2023-09-10 RX ORDER — LIDOCAINE 50 MG/G
1 PATCH TOPICAL DAILY
Status: DISCONTINUED | OUTPATIENT
Start: 2023-09-11 | End: 2023-09-14 | Stop reason: HOSPADM

## 2023-09-10 RX ORDER — ACETAMINOPHEN 325 MG/1
650 TABLET ORAL EVERY 6 HOURS PRN
Status: DISCONTINUED | OUTPATIENT
Start: 2023-09-10 | End: 2023-09-14 | Stop reason: HOSPADM

## 2023-09-10 RX ORDER — ATORVASTATIN CALCIUM 10 MG/1
5 TABLET, FILM COATED ORAL
Status: DISCONTINUED | OUTPATIENT
Start: 2023-09-10 | End: 2023-09-12

## 2023-09-10 RX ORDER — CEFTRIAXONE 1 G/50ML
1000 INJECTION, SOLUTION INTRAVENOUS EVERY 24 HOURS
Status: DISCONTINUED | OUTPATIENT
Start: 2023-09-10 | End: 2023-09-14

## 2023-09-10 RX ORDER — SODIUM CHLORIDE 9 MG/ML
100 INJECTION, SOLUTION INTRAVENOUS CONTINUOUS
Status: DISCONTINUED | OUTPATIENT
Start: 2023-09-10 | End: 2023-09-11

## 2023-09-10 RX ADMIN — SODIUM CHLORIDE 1000 ML: 0.9 INJECTION, SOLUTION INTRAVENOUS at 17:40

## 2023-09-10 RX ADMIN — CEFTRIAXONE 1000 MG: 1 INJECTION, SOLUTION INTRAVENOUS at 23:46

## 2023-09-10 RX ADMIN — ATORVASTATIN CALCIUM 5 MG: 10 TABLET, FILM COATED ORAL at 23:48

## 2023-09-10 RX ADMIN — SODIUM CHLORIDE 75 ML/HR: 0.9 INJECTION, SOLUTION INTRAVENOUS at 23:44

## 2023-09-10 RX ADMIN — METRONIDAZOLE 500 MG: 500 INJECTION, SOLUTION INTRAVENOUS at 22:21

## 2023-09-10 RX ADMIN — Medication 3 MG: at 23:48

## 2023-09-10 RX ADMIN — IOHEXOL 100 ML: 350 INJECTION, SOLUTION INTRAVENOUS at 21:05

## 2023-09-10 NOTE — ED NOTES
Pt had large BM. Pt cleaned up and new linen applied.  Pt now resting comfortably     Faisal Bryant RN  09/10/23 1930

## 2023-09-10 NOTE — ED PROVIDER NOTES
History  Chief Complaint   Patient presents with   • Altered Mental Status     Pt arrived EMS from [place]. Per EMS care center reports pt was sitting in wheelchair and was unresponsive. Per EMS pt was responsive upon arrival. Pt denies any chest pain, SOB, nausea, or vomiting. Pt arrived with diarrhea. Pt states to family "I feel fine with no complaints". 40-year-old male presents with an episode of unresponsiveness as witnessed by his family member. She says that he had food in front of him when she went to visit and some of the food was on his arm and he was unresponsive where he was not waking up as if and deep slumber. She went to get the nurse who connected the patient to oxygen and then called 911 no CPR was done at the time when patient arrived here with BLS he is alert awake only speaks Chris but appears to be at baseline mental status perhaps a little tired. Moving all his extremities. Does have tremors diffusely. Smiles and appears and not in any distress. She was recently admitted in July for altered mentation due to medications including Flexeril which were all stopped and he was started on risperidone which he was responsive to. Some underlying dementia noted. Currently at the nursing home for rehab purposes      History provided by:  Patient   used: No        Prior to Admission Medications   Prescriptions Last Dose Informant Patient Reported? Taking?   acetaminophen (TYLENOL) 325 mg tablet   No No   Sig: Take 2 tablets (650 mg total) by mouth every 6 (six) hours as needed for mild pain   amLODIPine (NORVASC) 2.5 mg tablet   No No   Sig: Take 1 tablet (2.5 mg total) by mouth daily Do not start before August 1, 2023.    atorvastatin (LIPITOR) 10 mg tablet   Yes No   Sig: Take 5 mg by mouth daily   docusate sodium (COLACE) 100 mg capsule   No No   Sig: Take 1 capsule (100 mg total) by mouth 2 (two) times a day   lidocaine (LIDODERM) 5 %   Yes No   Sig: Apply 1 patch topically daily Remove & Discard patch within 12 hours or as directed by MD   losartan (COZAAR) 100 MG tablet   No No   Sig: Take 1 tablet (100 mg total) by mouth daily Do not start before August 1, 2023. magnesium hydroxide (MILK OF MAGNESIA) 400 mg/5 mL oral suspension   Yes No   Sig: Take by mouth daily as needed for constipation   melatonin 3 mg   No No   Sig: Take 1 tablet (3 mg total) by mouth daily at bedtime   oxybutynin (DITROPAN) 5 mg tablet   Yes No   Sig: Take 5 mg by mouth 3 (three) times a day   polyethylene glycol (MIRALAX) 17 g packet   Yes No   Sig: Take 17 g by mouth daily   prazosin (MINIPRESS) 1 mg capsule   Yes No   Sig: Take 1 mg by mouth daily at bedtime   sertraline (ZOLOFT) 50 mg tablet   Yes No   Sig: Take 50 mg by mouth daily      Facility-Administered Medications: None       Past Medical History:   Diagnosis Date   • Anxiety    • Atherosclerotic coronary vascular disease    • Depressive disorder    • Hyperlipidemia    • Hypertension        History reviewed. No pertinent surgical history. History reviewed. No pertinent family history. I have reviewed and agree with the history as documented. E-Cigarette/Vaping   • E-Cigarette Use Never User      E-Cigarette/Vaping Substances     Social History     Tobacco Use   • Smoking status: Never   • Smokeless tobacco: Never   Vaping Use   • Vaping Use: Never used   Substance Use Topics   • Alcohol use: Not Currently   • Drug use: Never       Review of Systems   Constitutional: Negative. HENT: Negative. Eyes: Negative. Respiratory: Negative. Cardiovascular: Negative. Gastrointestinal: Negative. Endocrine: Negative. Genitourinary: Negative. Musculoskeletal: Negative. Skin: Negative. Allergic/Immunologic: Negative. Neurological: Negative. Hematological: Negative. Psychiatric/Behavioral: Positive for confusion and dysphoric mood. All other systems reviewed and are negative.       Physical Exam  Physical Exam  Vitals and nursing note reviewed. Constitutional:       Appearance: Normal appearance. HENT:      Head: Normocephalic and atraumatic. Nose: Nose normal.      Mouth/Throat:      Mouth: Mucous membranes are moist.   Eyes:      Extraocular Movements: Extraocular movements intact. Pupils: Pupils are equal, round, and reactive to light. Cardiovascular:      Rate and Rhythm: Normal rate and regular rhythm. Pulmonary:      Effort: Pulmonary effort is normal.      Breath sounds: Normal breath sounds. Abdominal:      General: Abdomen is flat. Bowel sounds are normal.      Palpations: Abdomen is soft. Musculoskeletal:         General: Normal range of motion. Cervical back: Normal range of motion and neck supple. Skin:     General: Skin is warm. Capillary Refill: Capillary refill takes less than 2 seconds. Neurological:      General: No focal deficit present. Mental Status: He is alert. Mental status is at baseline. GCS: GCS eye subscore is 4. GCS verbal subscore is 5. GCS motor subscore is 6. Psychiatric:         Mood and Affect: Mood normal.         Thought Content:  Thought content normal.         Vital Signs  ED Triage Vitals [09/10/23 1643]   Temperature Pulse Respirations Blood Pressure SpO2   97.6 °F (36.4 °C) (!) 106 18 135/60 96 %      Temp Source Heart Rate Source Patient Position - Orthostatic VS BP Location FiO2 (%)   Oral Monitor Sitting Left arm --      Pain Score       No Pain           Vitals:    09/10/23 1800 09/10/23 1930 09/10/23 2000 09/10/23 2030   BP: 115/55 162/67 154/68 128/62   Pulse: 70 82 83 81   Patient Position - Orthostatic VS:    Sitting         Visual Acuity      ED Medications  Medications   levofloxacin (LEVAQUIN) IVPB (premix in dextrose) 750 mg 150 mL (has no administration in time range)   metroNIDAZOLE (FLAGYL) IVPB (premix) 500 mg 100 mL (has no administration in time range)   sodium chloride 0.9 % bolus 1,000 mL (0 mL Intravenous Stopped 9/10/23 1840)   iohexol (OMNIPAQUE) 350 MG/ML injection (SINGLE-DOSE) 100 mL (100 mL Intravenous Given 9/10/23 2105)       Diagnostic Studies  Results Reviewed     Procedure Component Value Units Date/Time    HS Troponin I 2hr [[id number]]  (Normal) Collected: 09/10/23 1914    Lab Status: Final result Specimen: Blood from Arm, Right Updated: 09/10/23 1941     hs TnI 2hr 15 ng/L      Delta 2hr hsTnI -2 ng/L     HS Troponin 0hr (reflex protocol) [[id number]]  (Normal) Collected: 09/10/23 1722    Lab Status: Final result Specimen: Blood from Arm, Right Updated: 09/10/23 1755     hs TnI 0hr 17 ng/L     Comprehensive metabolic panel [[id number]]  (Abnormal) Collected: 09/10/23 1722    Lab Status: Final result Specimen: Blood from Arm, Right Updated: 09/10/23 1752     Sodium 135 mmol/L      Potassium 4.4 mmol/L      Chloride 101 mmol/L      CO2 22 mmol/L      ANION GAP 12 mmol/L      BUN 23 mg/dL      Creatinine 1.31 mg/dL      Glucose 189 mg/dL      Calcium 8.9 mg/dL      AST 22 U/L      ALT 16 U/L      Alkaline Phosphatase 65 U/L      Total Protein 6.9 g/dL      Albumin 4.0 g/dL      Total Bilirubin 0.69 mg/dL      eGFR 46 ml/min/1.73sq m     Narrative:      [facility] guidelines for Chronic Kidney Disease (CKD):   •  Stage 1 with normal or high GFR (GFR > 90 mL/min/1.73 square meters)  •  Stage 2 Mild CKD (GFR = 60-89 mL/min/1.73 square meters)  •  Stage 3A Moderate CKD (GFR = 45-59 mL/min/1.73 square meters)  •  Stage 3B Moderate CKD (GFR = 30-44 mL/min/1.73 square meters)  •  Stage 4 Severe CKD (GFR = 15-29 mL/min/1.73 square meters)  •  Stage 5 End Stage CKD (GFR <15 mL/min/1.73 square meters)  Note: GFR calculation is accurate only with a steady state creatinine    Magnesium [[id number]]  (Normal) Collected: 09/10/23 1722    Lab Status: Final result Specimen: Blood from Arm, Right Updated: 09/10/23 1752     Magnesium 1.9 mg/dL     Fingerstick Glucose (POCT) [[id number]] (Abnormal) Collected: 09/10/23 1731    Lab Status: Final result Updated: 09/10/23 1744     POC Glucose 163 mg/dl     Clostridium difficile toxin by PCR with EIA [[id number]] Collected: 09/10/23 1724    Lab Status: In process Specimen: Stool from Per Rectum Updated: 09/10/23 1733    Stool Enteric Bacterial Panel by PCR [[id number]] Collected: 09/10/23 1723    Lab Status: In process Specimen: Stool from Rectum Updated: 09/10/23 1732    CBC and differential [[id number]]  (Abnormal) Collected: 09/10/23 1722    Lab Status: Final result Specimen: Blood from Arm, Right Updated: 09/10/23 1732     WBC 16.60 Thousand/uL      RBC 3.97 Million/uL      Hemoglobin 12.9 g/dL      Hematocrit 40.9 %       fL      MCH 32.5 pg      MCHC 31.5 g/dL      RDW 14.5 %      MPV 12.2 fL      Platelets 494 Thousands/uL      nRBC 0 /100 WBCs      Neutrophils Relative 78 %      Immat GRANS % 1 %      Lymphocytes Relative 15 %      Monocytes Relative 4 %      Eosinophils Relative 1 %      Basophils Relative 1 %      Neutrophils Absolute 13.03 Thousands/µL      Immature Grans Absolute 0.16 Thousand/uL      Lymphocytes Absolute 2.46 Thousands/µL      Monocytes Absolute 0.68 Thousand/µL      Eosinophils Absolute 0.14 Thousand/µL      Basophils Absolute 0.13 Thousands/µL     UA (URINE) with reflex to Scope [[id number]]     Lab Status: No result Specimen: Urine                  CT abdomen pelvis with contrast   Final Result by Alberto Dockery MD (09/10 2137)      1. Persistent small opacity in the left lower lobe similar to prior examination may reflect atelectasis and/or scarring. 2.  Cholelithiasis without evidence of cholecystitis. 3.  Mild diffuse thickening of the rectosigmoid colon which may be due to nonspecific colitis. 4.  Other findings as above. Workstation performed: [id number]         CT head without contrast   Final Result by Renetta Dick MD (09/10 1843)      No acute intracranial abnormality.   Chronic microangiopathic changes. Workstation performed: [id number]         XR chest 1 view portable    (Results Pending)              Procedures  ECG 12 Lead Documentation Only    Date/Time: 9/10/2023 6:45 PM    Performed by: Evelyn Cordoba DO  Authorized by: Evelyn Cordoba DO    ECG reviewed by me, the ED Provider: yes    Patient location:  ED  Previous ECG:     Previous ECG:  Unavailable    Comparison to cardiac monitor: Yes    Interpretation:     Interpretation: non-specific    Rate:     ECG rate assessment: normal    Rhythm:     Rhythm: sinus rhythm    Ectopy:     Ectopy: none    QRS:     QRS axis:  Normal  Conduction:     Conduction: normal    ST segments:     ST segments:  Non-specific  T waves:     T waves: non-specific               ED Course                               SBIRT 20yo+    Flowsheet Row Most Recent Value   Initial Alcohol Screen: US AUDIT-C     1. How often do you have a drink containing alcohol? 0 Filed at: 09/10/2023 1700   2. How many drinks containing alcohol do you have on a typical day you are drinking? 0 Filed at: 09/10/2023 1700   3a. Male UNDER 65: How often do you have five or more drinks on one occasion? 0 Filed at: 09/10/2023 1700   3b. FEMALE Any Age, or MALE 65+: How often do you have 4 or more drinks on one occassion? 0 Filed at: 09/10/2023 1700   Audit-C Score 0 Filed at: 09/10/2023 1700   STEPHAN: How many times in the past year have you. .. Used an illegal drug or used a prescription medication for non-medical reasons? Never Filed at: 09/10/2023 1700                    Medical Decision Making  Patient evaluated with EKG labs imaging. Admitted to the family practice service for observation with colitis leukocytosis and to monitor him on the cardiac monitor for the episode of unresponsiveness. Patient remained hemodynamically stable asymptomatic during his ED course here.     Altered mental status: acute illness or injury  Colitis: acute illness or injury  Amount and/or Complexity of Data Reviewed  Independent Historian: EMS  External Data Reviewed: labs, radiology, ECG and notes. Labs: ordered. Decision-making details documented in ED Course. Radiology: ordered. Decision-making details documented in ED Course. ECG/medicine tests: ordered and independent interpretation performed. Decision-making details documented in ED Course. Risk  Prescription drug management. Decision regarding hospitalization. Disposition  Final diagnoses: Altered mental status   Colitis     Time reflects when diagnosis was documented in both MDM as applicable and the Disposition within this note     Time User Action Codes Description Comment    9/10/2023  9:33 PM RosalinauAmanda Add [R41.82] Altered mental status     9/10/2023  9:53 PM Anepu, Amanda Howard Add [K52.9] Colitis       ED Disposition     ED Disposition   Admit    Condition   Stable    Date/Time   Sun Sep 10, 2023  9:33 PM    Comment               Follow-up Information    None         Patient's Medications   Discharge Prescriptions    No medications on file       No discharge procedures on file.     PDMP Review     None          ED Provider  Electronically Signed by           July Juarez DO  09/10/23 7809

## 2023-09-10 NOTE — ED NOTES
Pt arrived with adult brief full of loose stool. This RN with the help of Hortencia NAVARRO gave pt bed bath and linen change. Pt repositioned in bed. Pt given warm blankets. Family at bedside.       Rosa Stephens RN  09/10/23 3706

## 2023-09-10 NOTE — ED NOTES
Pt daughter Cathy Mayo requested for staff to call her with any updates. [phone].       [address]  09/10/23 1956

## 2023-09-11 ENCOUNTER — APPOINTMENT (OUTPATIENT)
Dept: NON INVASIVE DIAGNOSTICS | Facility: HOSPITAL | Age: 88
DRG: 871 | End: 2023-09-11
Payer: COMMERCIAL

## 2023-09-11 PROBLEM — A41.9 SEPSIS (HCC): Status: ACTIVE | Noted: 2023-09-11

## 2023-09-11 PROBLEM — K52.9 COLITIS: Status: ACTIVE | Noted: 2023-09-11

## 2023-09-11 LAB
AMPHETAMINES SERPL QL SCN: NEGATIVE
ANION GAP SERPL CALCULATED.3IONS-SCNC: 5 MMOL/L
AORTIC ROOT: 3.7 CM
APICAL FOUR CHAMBER EJECTION FRACTION: 46 %
ATRIAL RATE: 86 BPM
AV LVOT PEAK GRADIENT: 3 MMHG
AV PEAK GRADIENT: 9 MMHG
BACTERIA UR QL AUTO: ABNORMAL /HPF
BARBITURATES UR QL: NEGATIVE
BASOPHILS # BLD AUTO: 0.08 THOUSANDS/ÂΜL (ref 0–0.1)
BASOPHILS NFR BLD AUTO: 1 % (ref 0–1)
BENZODIAZ UR QL: NEGATIVE
BUN SERPL-MCNC: 23 MG/DL (ref 5–25)
C DIFF TOX GENS STL QL NAA+PROBE: NEGATIVE
CALCIUM SERPL-MCNC: 7.9 MG/DL (ref 8.4–10.2)
CAMPYLOBACTER DNA SPEC NAA+PROBE: NORMAL
CHLORIDE SERPL-SCNC: 107 MMOL/L (ref 96–108)
CO2 SERPL-SCNC: 24 MMOL/L (ref 21–32)
COARSE GRAN CASTS URNS QL MICRO: ABNORMAL /LPF
COCAINE UR QL: NEGATIVE
CREAT SERPL-MCNC: 1.22 MG/DL (ref 0.6–1.3)
DOP CALC LVOT AREA: 3.14 CM2
DOP CALC LVOT DIAMETER: 2 CM
E WAVE DECELERATION TIME: 248 MS
EOSINOPHIL # BLD AUTO: 0.07 THOUSAND/ÂΜL (ref 0–0.61)
EOSINOPHIL NFR BLD AUTO: 1 % (ref 0–6)
ERYTHROCYTE [DISTWIDTH] IN BLOOD BY AUTOMATED COUNT: 14.6 % (ref 11.6–15.1)
FINE GRAN CASTS URNS QL MICRO: ABNORMAL /LPF
FLUAV RNA RESP QL NAA+PROBE: NEGATIVE
FLUBV RNA RESP QL NAA+PROBE: NEGATIVE
FRACTIONAL SHORTENING: 26 % (ref 28–44)
GFR SERPL CREATININE-BSD FRML MDRD: 50 ML/MIN/1.73SQ M
GLUCOSE P FAST SERPL-MCNC: 110 MG/DL (ref 65–99)
GLUCOSE SERPL-MCNC: 110 MG/DL (ref 65–140)
HCT VFR BLD AUTO: 31.9 % (ref 36.5–49.3)
HGB BLD-MCNC: 10.1 G/DL (ref 12–17)
HYALINE CASTS #/AREA URNS LPF: ABNORMAL /LPF
IMM GRANULOCYTES # BLD AUTO: 0.04 THOUSAND/UL (ref 0–0.2)
IMM GRANULOCYTES NFR BLD AUTO: 0 % (ref 0–2)
INTERVENTRICULAR SEPTUM IN DIASTOLE (PARASTERNAL SHORT AXIS VIEW): 1 CM
INTERVENTRICULAR SEPTUM: 1 CM (ref 0.6–1.1)
LAAS-AP4: 17.9 CM2
LACTATE SERPL-SCNC: 0.8 MMOL/L (ref 0.5–2)
LEFT ATRIUM AREA SYSTOLE SINGLE PLANE A4C: 16.6 CM2
LEFT ATRIUM SIZE: 3.2 CM
LEFT INTERNAL DIMENSION IN SYSTOLE: 2.9 CM (ref 2.1–4)
LEFT VENTRICULAR INTERNAL DIMENSION IN DIASTOLE: 3.9 CM (ref 3.5–6)
LEFT VENTRICULAR POSTERIOR WALL IN END DIASTOLE: 1.1 CM
LEFT VENTRICULAR STROKE VOLUME: 34 ML
LVSV (TEICH): 34 ML
LYMPHOCYTES # BLD AUTO: 2.9 THOUSANDS/ÂΜL (ref 0.6–4.47)
LYMPHOCYTES NFR BLD AUTO: 24 % (ref 14–44)
MCH RBC QN AUTO: 32.2 PG (ref 26.8–34.3)
MCHC RBC AUTO-ENTMCNC: 31.7 G/DL (ref 31.4–37.4)
MCV RBC AUTO: 102 FL (ref 82–98)
METHADONE UR QL: NEGATIVE
MONOCYTES # BLD AUTO: 1.08 THOUSAND/ÂΜL (ref 0.17–1.22)
MONOCYTES NFR BLD AUTO: 9 % (ref 4–12)
MV E'TISSUE VEL-LAT: 7 CM/S
MV E'TISSUE VEL-SEP: 5 CM/S
MV PEAK A VEL: 1.27 M/S
MV PEAK E VEL: 71 CM/S
MV STENOSIS PRESSURE HALF TIME: 72 MS
MV VALVE AREA P 1/2 METHOD: 3.06 CM2
NEUTROPHILS # BLD AUTO: 7.71 THOUSANDS/ÂΜL (ref 1.85–7.62)
NEUTS SEG NFR BLD AUTO: 65 % (ref 43–75)
NON-SQ EPI CELLS URNS QL MICRO: ABNORMAL /HPF
NRBC BLD AUTO-RTO: 0 /100 WBCS
OPIATES UR QL SCN: NEGATIVE
OXYCODONE+OXYMORPHONE UR QL SCN: NEGATIVE
P AXIS: 50 DEGREES
PCP UR QL: NEGATIVE
PLATELET # BLD AUTO: 197 THOUSANDS/UL (ref 149–390)
PMV BLD AUTO: 12.5 FL (ref 8.9–12.7)
POTASSIUM SERPL-SCNC: 4.2 MMOL/L (ref 3.5–5.3)
PR INTERVAL: 286 MS
PV PEAK GRADIENT: 4 MMHG
QRS AXIS: -36 DEGREES
QRSD INTERVAL: 94 MS
QT INTERVAL: 382 MS
QTC INTERVAL: 457 MS
RBC # BLD AUTO: 3.14 MILLION/UL (ref 3.88–5.62)
RBC #/AREA URNS AUTO: ABNORMAL /HPF
RIGHT ATRIUM AREA SYSTOLE A4C: 15.4 CM2
RIGHT VENTRICLE ID DIMENSION: 3.2 CM
RSV RNA RESP QL NAA+PROBE: NEGATIVE
SALMONELLA DNA SPEC QL NAA+PROBE: NORMAL
SARS-COV-2 RNA RESP QL NAA+PROBE: NEGATIVE
SHIGA TOXIN STX GENE SPEC NAA+PROBE: NORMAL
SHIGELLA DNA SPEC QL NAA+PROBE: NORMAL
SL CV LV EF: 55
SL CV PED ECHO LEFT VENTRICLE DIASTOLIC VOLUME (MOD BIPLANE) 2D: 67 ML
SL CV PED ECHO LEFT VENTRICLE SYSTOLIC VOLUME (MOD BIPLANE) 2D: 34 ML
SODIUM SERPL-SCNC: 136 MMOL/L (ref 135–147)
T WAVE AXIS: -1 DEGREES
THC UR QL: NEGATIVE
TR MAX PG: 30 MMHG
TR PEAK VELOCITY: 2.8 M/S
TRICUSPID ANNULAR PLANE SYSTOLIC EXCURSION: 1.4 CM
TRICUSPID VALVE PEAK REGURGITATION VELOCITY: 2.76 M/S
VENTRICULAR RATE: 86 BPM
WBC # BLD AUTO: 11.88 THOUSAND/UL (ref 4.31–10.16)
WBC #/AREA URNS AUTO: ABNORMAL /HPF

## 2023-09-11 PROCEDURE — 85025 COMPLETE CBC W/AUTO DIFF WBC: CPT

## 2023-09-11 PROCEDURE — 99223 1ST HOSP IP/OBS HIGH 75: CPT | Performed by: INTERNAL MEDICINE

## 2023-09-11 PROCEDURE — 97535 SELF CARE MNGMENT TRAINING: CPT

## 2023-09-11 PROCEDURE — 93306 TTE W/DOPPLER COMPLETE: CPT

## 2023-09-11 PROCEDURE — 80048 BASIC METABOLIC PNL TOTAL CA: CPT

## 2023-09-11 PROCEDURE — 97167 OT EVAL HIGH COMPLEX 60 MIN: CPT

## 2023-09-11 PROCEDURE — 97163 PT EVAL HIGH COMPLEX 45 MIN: CPT

## 2023-09-11 PROCEDURE — 93306 TTE W/DOPPLER COMPLETE: CPT | Performed by: INTERNAL MEDICINE

## 2023-09-11 PROCEDURE — 97110 THERAPEUTIC EXERCISES: CPT

## 2023-09-11 PROCEDURE — 87040 BLOOD CULTURE FOR BACTERIA: CPT

## 2023-09-11 PROCEDURE — 93010 ELECTROCARDIOGRAM REPORT: CPT | Performed by: INTERNAL MEDICINE

## 2023-09-11 PROCEDURE — 83605 ASSAY OF LACTIC ACID: CPT

## 2023-09-11 RX ORDER — AMLODIPINE BESYLATE 2.5 MG/1
2.5 TABLET ORAL DAILY
Status: DISCONTINUED | OUTPATIENT
Start: 2023-09-11 | End: 2023-09-12

## 2023-09-11 RX ADMIN — CEFTRIAXONE 1000 MG: 1 INJECTION, SOLUTION INTRAVENOUS at 23:21

## 2023-09-11 RX ADMIN — METRONIDAZOLE 500 MG: 500 INJECTION, SOLUTION INTRAVENOUS at 21:30

## 2023-09-11 RX ADMIN — METRONIDAZOLE 500 MG: 500 INJECTION, SOLUTION INTRAVENOUS at 14:56

## 2023-09-11 RX ADMIN — LIDOCAINE 5% 1 PATCH: 700 PATCH TOPICAL at 08:52

## 2023-09-11 RX ADMIN — SERTRALINE HYDROCHLORIDE 50 MG: 50 TABLET ORAL at 08:52

## 2023-09-11 RX ADMIN — ENOXAPARIN SODIUM 40 MG: 40 INJECTION SUBCUTANEOUS at 08:52

## 2023-09-11 RX ADMIN — Medication 3 MG: at 21:26

## 2023-09-11 RX ADMIN — METRONIDAZOLE 500 MG: 500 INJECTION, SOLUTION INTRAVENOUS at 06:15

## 2023-09-11 RX ADMIN — ATORVASTATIN CALCIUM 5 MG: 10 TABLET, FILM COATED ORAL at 16:55

## 2023-09-11 RX ADMIN — AMLODIPINE BESYLATE 2.5 MG: 2.5 TABLET ORAL at 08:52

## 2023-09-11 NOTE — UTILIZATION REVIEW
Initial Clinical Review    Observation 9/10/23 @ 2133, converted to inpatient admission 9/11/23 @ [id number] for continued care & tx for sepsis. Admission: Date/Time/Statement:   Admission Orders (From admission, onward)     Ordered        09/11/23 1439  Inpatient Admission  Once            09/10/23 2133  Place in Observation  Once                      Orders Placed This Encounter   Procedures   • Inpatient Admission     Standing Status:   Standing     Number of Occurrences:   1     Order Specific Question:   Level of Care     Answer:   Med Surg [16]     Order Specific Question:   Estimated length of stay     Answer:   More than 2 Midnights     Order Specific Question:   Certification     Answer:   I certify that inpatient services are medically necessary for this patient for a duration of greater than two midnights. See H&P and MD Progress Notes for additional information about the patient's course of treatment. ED Arrival Information     Expected   -    Arrival   9/10/2023 16:40    Acuity   Urgent            Means of arrival   Ambulance    Escorted by   [facility]    Service   Hospitalist    Admission type   Emergency            Arrival complaint   ams           Chief Complaint   Patient presents with   • Altered Mental Status     Pt arrived EMS from [facility]. Per EMS care center reports pt was sitting in wheelchair and was unresponsive. Per EMS pt was responsive upon arrival. Pt denies any chest pain, SOB, nausea, or vomiting. Pt arrived with diarrhea. Pt states to family "I feel fine with no complaints". Initial Presentation:   80 yom to ER from nursing facility via EMS for  episode of unresponsiveness as witnessed by his family member. She says that he had food in front of him when she went to visit and some of the food was on his arm and he was unresponsive where he was not waking up as if and deep slumber.   She went to get the nurse who connected the patient to oxygen and then called 911 no CPR was done at the time when patient arrived here with BLS he is alert awake only speaks Chris but appears to be at baseline mental status perhaps a little tired. Moving all his extremities. Does have tremors diffusely. Smiles and appears and not in any distress. She was recently admitted in July for altered mentation due to medications including Flexeril which were all stopped and he was started on risperidone which he was responsive to. Presents confused, tachycardic, diarrhea in ER. Admission CT AP: Mild diffuse thickening of the rectosigmoid colon, likely colitis, leukocytosis, elevated Cr. Placed in observation status for sepsis. Observation to IP admission 9/11/23:  IVABT continued for sepsis 2nd colitis, cultures pending. Lungs, diminished, clear. Abdomen soft nontender. Date: 9/12/23    Day 3: Has surpassed a 2nd midnight with active treatments and services, which include IVABT, serial abd exams, monitor labs, VS, telemetry monitoring, contact isolation.      ED Triage Vitals [09/10/23 1643]   Temperature Pulse Respirations Blood Pressure SpO2   97.6 °F (36.4 °C) (!) 106 18 135/60 96 %      Temp Source Heart Rate Source Patient Position - Orthostatic VS BP Location FiO2 (%)   Oral Monitor Sitting Left arm --      Pain Score       No Pain          Wt Readings from Last 1 Encounters:   09/12/23 75.3 kg (165 lb 14.4 oz)     Additional Vital Signs:   Date/Time Temp Pulse Resp BP MAP (mmHg) SpO2 O2 Device Patient Position - Orthostatic VS   09/11/23 0238 -- 77 17 130/51 77 97 % -- --   09/10/23 2335 99.7 °F (37.6 °C) 77 18 136/73 94 99 % -- --   09/10/23 2030 -- 81 18 128/62 89 99 % None (Room air) Sitting   09/10/23 2000 -- 83 16 154/68 98 96 % -- --   09/10/23 1930 -- 82 16 162/67 96 98 % -- --   09/10/23 1800 -- 70 18 115/55 79 96 % None (Room air) --   09/10/23 1745 -- 74 -- 125/61 88 95 % -- --   09/10/23 1720 -- -- -- -- -- -- None (Room air) --   09/10/23 1700 -- 82 18 129/60 86 97 % None (Room air) Sitting   09/10/23 1643 97.6 °F (36.4 °C) 106 Abnormal  18 135/60 -- 96 % None (Room air) Sitting       Pertinent Labs/Diagnostic Test Results:   CT abdomen pelvis with contrast   Final Result  (09/10 2137)      1. Persistent small opacity in the left lower lobe similar to prior examination may reflect atelectasis and/or scarring. 2.  Cholelithiasis without evidence of cholecystitis. 3.  Mild diffuse thickening of the rectosigmoid colon which may be due to nonspecific colitis. CT head without contrast   Final Result (09/10 1843)      No acute intracranial abnormality. Chronic microangiopathic changes. XR chest 1 view portable   Final Result  (09/11 0545)      Minimal bibasilar atelectasis.      Results from last 7 days   Lab Units 09/10/23  2340   SARS-COV-2  Negative     Results from last 7 days   Lab Units 09/12/23  0512 09/11/23  0515 09/10/23  1722   WBC Thousand/uL 9.86 11.88* 16.60*   HEMOGLOBIN g/dL 9.8* 10.1* 12.9   HEMATOCRIT % 31.8* 31.9* 40.9   PLATELETS Thousands/uL 196 197 230   NEUTROS ABS Thousands/µL 5.81 7.71* 13.03*         Results from last 7 days   Lab Units 09/12/23  0512 09/11/23  0515 09/10/23  1722   SODIUM mmol/L 137 136 135   POTASSIUM mmol/L 3.8 4.2 4.4   CHLORIDE mmol/L 107 107 101   CO2 mmol/L 24 24 22   ANION GAP mmol/L 6 5 12   BUN mg/dL 19 23 23   CREATININE mg/dL 1.09 1.22 1.31*   EGFR ml/min/1.73sq m 57 50 46   CALCIUM mg/dL 8.2* 7.9* 8.9   MAGNESIUM mg/dL  --   --  1.9     Results from last 7 days   Lab Units 09/10/23  1722   AST U/L 22   ALT U/L 16   ALK PHOS U/L 65   TOTAL PROTEIN g/dL 6.9   ALBUMIN g/dL 4.0   TOTAL BILIRUBIN mg/dL 0.69     Results from last 7 days   Lab Units 09/10/23  1731   POC GLUCOSE mg/dl 163*     Results from last 7 days   Lab Units 09/12/23  0512 09/11/23  0515 09/10/23  1722   GLUCOSE RANDOM mg/dL 100 110 189*     Results from last 7 days   Lab Units 09/10/23  1914 09/10/23  1722   HS TNI 0HR ng/L  --  17   HS TNI 2HR ng/L 15  --    HSTNI D2 ng/L -2  --      Results from last 7 days   Lab Units 09/11/23  0155   LACTIC ACID mmol/L 0.8     Results from last 7 days   Lab Units 09/10/23  2340   CLARITY UA  Clear   COLOR UA  Yellow   SPEC GRAV UA  1.015   PH UA  5.5   GLUCOSE UA mg/dl Negative   KETONES UA mg/dl Negative   BLOOD UA  Negative   PROTEIN UA mg/dl Trace*   NITRITE UA  Negative   BILIRUBIN UA  Negative   UROBILINOGEN UA E.U./dl 2.0*   LEUKOCYTES UA  Negative   WBC UA /hpf 0-1   RBC UA /hpf 0-1   BACTERIA UA /hpf Occasional   EPITHELIAL CELLS WET PREP /hpf Occasional     Results from last 7 days   Lab Units 09/10/23  2340   INFLUENZA A PCR  Negative   INFLUENZA B PCR  Negative   RSV PCR  Negative     Results from last 7 days   Lab Units 09/10/23  2340   AMPH/METH  Negative   BARBITURATE UR  Negative   BENZODIAZEPINE UR  Negative   COCAINE UR  Negative   METHADONE URINE  Negative   OPIATE UR  Negative   PCP UR  Negative   THC UR  Negative     Results from last 7 days   Lab Units 09/10/23  1724   C DIFF TOXIN B BY PCR  Negative     Results from last 7 days   Lab Units 09/10/23  1723   SALMONELLA SP PCR  None Detected   SHIGELLA SP/ENTEROINVASIVE E. COLI (EIEC)  None Detected   CAMPYLOBACTER SP (JEJUNI AND COLI)  None Detected   SHIGA TOXIN 1/SHIGA TOXIN 2  None Detected     Results from last 7 days   Lab Units 09/11/23  0156   BLOOD CULTURE  Received in Microbiology Lab. Culture in Progress. Received in Microbiology Lab. Culture in Progress.      ED Treatment:   Medication Administration from 09/10/2023 1640 to 09/10/2023 2318       Date/Time Order Dose Route Action     09/10/2023 1740 EDT sodium chloride 0.9 % bolus 1,000 mL 1,000 mL Intravenous New Bag     09/10/2023 2105 EDT iohexol (OMNIPAQUE) 350 MG/ML injection (SINGLE-DOSE) 100 mL 100 mL Intravenous Given     09/10/2023 2221 EDT metroNIDAZOLE (FLAGYL) IVPB (premix) 500 mg 100 mL 500 mg Intravenous New Bag        Past Medical History:   Diagnosis Date   • Anxiety    • Atherosclerotic coronary vascular disease    • Depressive disorder    • Hyperlipidemia    • Hypertension      Present on Admission:  • Compression fracture of vertebral column (HCC)  • CAD (coronary artery disease)  • Hyperlipidemia  • Depression with anxiety  • Hypertension  • Abnormal CT scan  • Acute encephalopathy  • Elevated serum creatinine      Admitting Diagnosis: Colitis [K52.9]  Altered mental status [R41.82]  AMS (altered mental status) [R41.82]  Age/Sex: 80 y.o. male  Admission Orders:  Pt/ot eval & tx  Telemetry  Speech bedside swallow eval & tx  Nursing dysphagia screen  Scd/foot pumps    Scheduled Medications:  amLODIPine, 2.5 mg, Oral, Daily  atorvastatin, 5 mg, Oral, Daily With Dinner  cefTRIAXone, 1,000 mg, Intravenous, Q24H  enoxaparin, 40 mg, Subcutaneous, Daily  lidocaine, 1 patch, Topical, Daily  melatonin, 3 mg, Oral, HS  metroNIDAZOLE, 500 mg, Intravenous, Q8H  sertraline, 50 mg, Oral, Daily    Continuous IV Infusions:  sodium chloride, 100 mL/hr, Intravenous, Continuous    PRN Meds:  acetaminophen, 650 mg, Oral, Q6H PRN    Network Utilization Review Department  ATTENTION: Please call with any questions or concerns to [phone] and carefully listen to the prompts so that you are directed to the right person. All voicemails are confidential.  Selvin Goldberg all requests for admission clinical reviews, approved or denied determinations and any other requests to dedicated fax number below belonging to the campus where the patient is receiving treatment.  List of dedicated fax numbers for the Facilities:  [place] DENIALS (Administrative/Medical Necessity) [phone] [facility] [facility] [phone]   [address] [place] [address] [phone]   [facility] [phone]   [address] [place] [address] [address] [address] [facility] [address] [facility] [address] [phone]

## 2023-09-11 NOTE — H&P
History and Physical - [facility]  Family Medicine Residency     Patient Information: Prema Goins 80 y.o. male MRN: [id number]  Unit/Bed#: [address] [place] [address] Encounter: [id number]  Admitting Physician: Kasie Styles MD   PCP: No primary care provider on file. Date of Admission:  09/11/23     Assessment and Plan     * Sepsis ([address])  Assessment & Plan  · POA tachycardia and leukocytosis. Noted loose BM in ED  · CT AP: Mild diffuse thickening of the rectosigmoid colon, likely colitis  · Received 1 L of NS in the ED  · Negative UA, urine microscopy positive for granular casts    Plan  · Continue  cc/h for 8 hours  · Bcx x2 - drawn after given abx  · Stool cultures and C diff PCR pending  · Continue Rocephin and Flagyl  · Monitor fever curve and CBC  · Pending chest x-ray    Colitis  Assessment & Plan  No abdominal tenderness on palpation. But noted large stools and CTAP suggestive of colitis. · Continue on Rocephin and Flagyl    Elevated serum creatinine  Assessment & Plan  POA CR 1.31  · Baseline 0.97-1.07  · Urine microscopy showed granular casts  · S/p 1 L of NS in ED  · Continue with  cc/h    Acute encephalopathy  Assessment & Plan  Presents for an episode of unresponsiveness at the nursing home. Patient was found slouching in his wheelchair and vomited small digested food. He had loose BM in the ED, and CTAP showed mild diffuse thickening of rectosigmoid colon. CT head demonstrated no acute intracranial abnormalities. · Likely delirium 2/2 colitis  · Pending stool studies and C diff PCR  · Holding home oxybutynin 5 mg hs and prazosin 1 mg hs due to anticholinergic properties    CAD (coronary artery disease)  Assessment & Plan  Continue Lipitor    Depression with anxiety  Assessment & Plan  · Continue on home Zoloft 50 mg    Hyperlipidemia  Assessment & Plan  Continue Lipitor 5 mg daily    Hypertension  Assessment & Plan  Stable.   Per NH, home meds include Losartan 100 mg daily and Norvasc 2.5 mg daily. Per daughter, patient losartan 75 mg daily. · Continue Norvasc 2.5 mg daily  · Holding losartan in setting of sepsis and elevated creatinine  · Monitor vitals       Fluids:  cc/hr  Electrolyte repletion: Replete as needed. Nutrition:        Diet Orders   (From admission, onward)             Start     Ordered    09/10/23 2304  Diet Regular; Regular House  Diet effective now        References:    Adult Nutrition Support Algorithm    RD Therapeutic Diet Order Protocol   Question Answer Comment   Diet Type Regular    Regular Regular House    RD to adjust diet per protocol? Yes        09/10/23 2312               VTE Prophylaxis: VTE Score: 5 High Risk (Score >/= 5) - Pharmacological DVT Prophylaxis Ordered: enoxaparin (Lovenox). Sequential Compression Devices Ordered. Code Status: Level 1 - Full Code  Anticipated Length of Stay:  Patient will be admitted on an Observation basis with an anticipated length of stay of  less than 2 midnights. Justification for Hospital Stay: Altered mental status  Total Time for Visit, including Counseling / Coordination of Care: 45 mins. Greater than 50% of this total time spent on direct patient counseling and coordination of care. Patient Information Sharing: With the consent of Kiera Alvarez, their loved ones (daughter) were notified today by inpatient team of the patient’s condition and current plan. All questions answered. Chief Complaint:     Chief Complaint   Patient presents with   • Altered Mental Status     Pt arrived EMS from [facility]. Per EMS care center reports pt was sitting in wheelchair and was unresponsive. Per EMS pt was responsive upon arrival. Pt denies any chest pain, SOB, nausea, or vomiting. Pt arrived with diarrhea. Pt states to family "I feel fine with no complaints".         Subjective      History of Present Illness:     Kiera Alvarez is a 80 y.o. male with PMH of HTN, CAD, HLD, and depression who presented after an episode of unresponsiveness at the [facility]. Patient's daughter found him unresponsive while sitting in his wheelchair and was unable to wake him up. Per nursing home staff, patient appeared to have vomited a small amount of digested food and had coarse breathing sound. His vitals at that time showed , systolic BP 89, respiratory rate 10, and afebrile. When EMS arrived, patient appears more alert and verbal.  Per nursing home, patient has not had any diarrhea, abdominal pain, cough, or congestion. There has been no new medications. On assessment in the ED, patient was alert and oriented to his name after questioning use a . He was pleasant and does not appear to be in any distress. However patient was not able to tell me if he had any fever, chills, abdominal pain, cough, suprapubic tenderness, or dysuria. ED course: NS 1 L, Flagyl 500 mg       Review of Systems:  Review of Systems   Constitutional: Negative for chills and fever. HENT: Negative for ear pain and sore throat. Eyes: Negative for pain and visual disturbance. Respiratory: Negative for cough and shortness of breath. Cardiovascular: Negative for chest pain and palpitations. Gastrointestinal: Negative for abdominal pain and vomiting. Genitourinary: Negative for dysuria and hematuria. Musculoskeletal: Negative for arthralgias and back pain. Skin: Negative for color change and rash. Neurological: Negative for seizures and syncope. Psychiatric/Behavioral: Positive for behavioral problems. All other systems reviewed and are negative. Past Medical History:   Diagnosis Date   • Anxiety    • Atherosclerotic coronary vascular disease    • Depressive disorder    • Hyperlipidemia    • Hypertension      History reviewed. No pertinent surgical history.   No Known Allergies  Prior to Admission Medications   Prescriptions Last Dose Informant Patient Reported? Taking?   acetaminophen (TYLENOL) 325 mg tablet   No No   Sig: Take 2 tablets (650 mg total) by mouth every 6 (six) hours as needed for mild pain   amLODIPine (NORVASC) 2.5 mg tablet   No No   Sig: Take 1 tablet (2.5 mg total) by mouth daily Do not start before August 1, 2023. atorvastatin (LIPITOR) 10 mg tablet   Yes No   Sig: Take 5 mg by mouth daily   docusate sodium (COLACE) 100 mg capsule   No No   Sig: Take 1 capsule (100 mg total) by mouth 2 (two) times a day   lidocaine (LIDODERM) 5 %   Yes No   Sig: Apply 1 patch topically daily Remove & Discard patch within 12 hours or as directed by MD   losartan (COZAAR) 100 MG tablet   No No   Sig: Take 1 tablet (100 mg total) by mouth daily Do not start before August 1, 2023.    magnesium hydroxide (MILK OF MAGNESIA) 400 mg/5 mL oral suspension   Yes No   Sig: Take by mouth daily as needed for constipation   melatonin 3 mg   No No   Sig: Take 1 tablet (3 mg total) by mouth daily at bedtime   oxybutynin (DITROPAN) 5 mg tablet   Yes No   Sig: Take 5 mg by mouth 3 (three) times a day   polyethylene glycol (MIRALAX) 17 g packet   Yes No   Sig: Take 17 g by mouth daily   prazosin (MINIPRESS) 1 mg capsule   Yes No   Sig: Take 1 mg by mouth daily at bedtime   sertraline (ZOLOFT) 50 mg tablet   Yes No   Sig: Take 50 mg by mouth daily      Facility-Administered Medications: None     Social History     Socioeconomic History   • Marital status: /Civil Union     Spouse name: Not on file   • Number of children: Not on file   • Years of education: Not on file   • Highest education level: Not on file   Occupational History   • Not on file   Tobacco Use   • Smoking status: Never   • Smokeless tobacco: Never   Vaping Use   • Vaping Use: Never used   Substance and Sexual Activity   • Alcohol use: Not Currently   • Drug use: Never   • Sexual activity: Not on file   Other Topics Concern   • Not on file   Social History Narrative   • Not on file     Social Determinants of Health     Financial Resource Strain: Not on file   Food Insecurity: No Food Insecurity (7/25/2023)    Hunger Vital Sign    • Worried About Running Out of Food in the Last Year: Never true    • Ran Out of Food in the Last Year: Never true   Transportation Needs: No Transportation Needs (7/25/2023)    PRAPARE - Transportation    • Lack of Transportation (Medical): No    • Lack of Transportation (Non-Medical): No   Physical Activity: Not on file   Stress: Not on file   Social Connections: Not on file   Intimate Partner Violence: Not on file   Housing Stability: Low Risk  (7/25/2023)    Housing Stability Vital Sign    • Unable to Pay for Housing in the Last Year: No    • Number of Places Lived in the Last Year: 1    • Unstable Housing in the Last Year: No     History reviewed. No pertinent family history. Patient Pre-hospital Living Situation: [facility]        Objective     Physical Exam:   Vitals:   Blood Pressure: 136/73 (09/10/23 2335)  Pulse: 77 (09/10/23 2335)  Temperature: 99.7 °F (37.6 °C) (09/10/23 2335)  Temp Source: Oral (09/10/23 1643)  Respirations: 18 (09/10/23 2335)  Height: 5' 10" (177.8 cm) (09/10/23 1643)  Weight - Scale: 78.8 kg (173 lb 11.6 oz) (09/10/23 1643)  SpO2: 99 % (09/10/23 2335)     Physical Exam  Vitals reviewed. Constitutional:       General: He is not in acute distress. Appearance: Normal appearance. HENT:      Head: Normocephalic and atraumatic. Cardiovascular:      Rate and Rhythm: Normal rate and regular rhythm. Pulses: Normal pulses. Heart sounds: Normal heart sounds. No murmur heard. Pulmonary:      Effort: Pulmonary effort is normal. No respiratory distress. Breath sounds: Normal breath sounds. No wheezing. Abdominal:      General: Bowel sounds are normal.      Palpations: Abdomen is soft. Tenderness: There is no abdominal tenderness. Skin:     General: Skin is warm and dry.    Neurological:      General: No focal deficit present. Mental Status: He is alert and oriented to person, place, and time. Psychiatric:         Mood and Affect: Mood normal.         Behavior: Behavior normal.          Lab Results: I have personally reviewed pertinent reports. Results from last 7 days   Lab Units 09/10/23  1722   WBC Thousand/uL 16.60*   HEMOGLOBIN g/dL 12.9   HEMATOCRIT % 40.9   PLATELETS Thousands/uL 230   NEUTROS PCT % 78*   LYMPHS PCT % 15   MONOS PCT % 4   EOS PCT % 1     Results from last 7 days   Lab Units 09/10/23  1722   POTASSIUM mmol/L 4.4   CHLORIDE mmol/L 101   CO2 mmol/L 22   BUN mg/dL 23   CREATININE mg/dL 1.31*   CALCIUM mg/dL 8.9   ALK PHOS U/L 65   ALT U/L 16   AST U/L 22   EGFR ml/min/1.73sq m 46   MAGNESIUM mg/dL 1.9                      Results from last 7 days   Lab Units 09/10/23  2340   COLOR UA  Yellow   CLARITY UA  Clear   SPEC GRAV UA  1.015   PH UA  5.5   LEUKOCYTES UA  Negative   NITRITE UA  Negative   GLUCOSE UA mg/dl Negative   KETONES UA mg/dl Negative   BILIRUBIN UA  Negative   BLOOD UA  Negative      Results from last 7 days   Lab Units 09/10/23  2340   RBC UA /hpf 0-1   WBC UA /hpf 0-1   EPITHELIAL CELLS WET PREP /hpf Occasional   BACTERIA UA /hpf Occasional      Results from last 7 days   Lab Units 09/10/23  1914 09/10/23  1722   HS TNI 0HR ng/L  --  17   HS TNI 2HR ng/L 15  --              Imaging: I have personally reviewed pertinent reports. CT abdomen pelvis with contrast    Result Date: 9/10/2023  1. Persistent small opacity in the left lower lobe similar to prior examination may reflect atelectasis and/or scarring. 2.  Cholelithiasis without evidence of cholecystitis. 3.  Mild diffuse thickening of the rectosigmoid colon which may be due to nonspecific colitis. 4.  Other findings as above. Workstation performed: [id number]     CT head without contrast    Result Date: 9/10/2023  No acute intracranial abnormality. Chronic microangiopathic changes.  Workstation performed: [id number]         EKG, Pathology, and Other Studies Reviewed on Admission:   EKG  Result Date: 09/11/23  Impression:  Sinus rhythm with first-degree AV block and premature atrial complexes            ** Please Note: This note has been constructed using a voice recognition system **     [address] Hidalgo MD  09/11/23  1:40 AM

## 2023-09-11 NOTE — CASE MANAGEMENT
Case Management Assessment & Discharge Planning Note    Patient name Uriel Skaggs  Location [address] [facility]-* MRN [id number]  : 1929 Date 2023       Current Admission Date: 9/10/2023  Current Admission Diagnosis:Sepsis [facility])   Patient Active Problem List    Diagnosis Date Noted   • Sepsis ([address]) 2023   • Colitis 2023   • Hypertension 2023   • Hyperlipidemia 2023   • Acute encephalopathy 2023   • Depression with anxiety 2023   • CAD (coronary artery disease) 2023   • Elevated serum creatinine 2023   • Abnormal CT scan 2023   • Compression fracture of vertebral column ([address]) 2023      LOS (days): 0  Geometric Mean LOS (GMLOS) (days):   Days to GMLOS:     OBJECTIVE:      Current admission status: Observation     Preferred Pharmacy:   UNKNOWN - FOLLOW UP PRIOR TO DISCHARGE TO E-PRESCRIBE  No address on file      Primary Care Provider: No primary care provider on file. Primary Insurance: [facility]  Secondary Insurance:  [facility]    ASSESSMENT:  Aury Proxies    There are no active Health Care Proxies on file. Obs Notice Signed: 23 (MOON reviewed via phone with dtr. Original provided.  Copy placed in scan bin.)    Readmission Root Cause  30 Day Readmission: No    Patient Information  Admitted from[de-identified] Facility [facility])  Mental Status: Alert  Assessment information provided by[de-identified] Daughter  Primary Caregiver: Family  Caregiver's Name[de-identified] René Sims (dtr)  Caregiver's Relationship to Patient[de-identified] Family Member  Caregiver's Telephone Number[de-identified] [phone]  Support Systems: Self, Son, Daughter  [place] of Residence: [address] [place] [address] do you live in?: [place], [place]  Type of Current Residence: 2 Mobile home  Upon entering residence, is there a bedroom on the main floor (no further steps)?: No  A bedroom is located on the following floor levels of residence (select all that apply):: 2nd Floor  Upon entering residence, is there a bathroom on the main floor (no further steps)?: No  Indicate which floors of current residence have a bathroom (select all the apply):: 2nd Floor  Number of steps to 2nd floor from main floor: One Flight  In the last 12 months, was there a time when you were not able to pay the mortgage or rent on time?: No  In the last 12 months, how many places have you lived?: 1  In the last 12 months, was there a time when you did not have a steady place to sleep or slept in a shelter (including now)?: No  Homeless/housing insecurity resource given?: N/A  Living Arrangements: Lives w/ Daughter    Activities of Daily Living Prior to Admission  Functional Status: Assistance  Completes ADLs independently?: No  Level of ADL dependence: Assistance  Ambulates independently?: No  Level of ambulatory dependence: Assistance  Does the patient have a history of Short-Term Rehab?: Yes [facility] 7/31-9/10)     Patient Information Continued  Income Source: Pension/intermediate  Does patient have prescription coverage?: Yes  Within the past 12 months, you worried that your food would run out before you got the money to buy more.: Never true  Within the past 12 months, the food you bought just didn't last and you didn't have money to get more.: Never true  Food insecurity resource given?: N/A     Means of Transportation  Means of Transport to Appts[de-identified] Family transport  In the past 12 months, has lack of transportation kept you from medical appointments or from getting medications?: No  In the past 12 months, has lack of transportation kept you from meetings, work, or from getting things needed for daily living?: No  Was application for public transport provided?: N/A    DISCHARGE DETAILS:    Discharge planning discussed with[de-identified] Vanessa Woo  Freedom of Choice: Yes     CM contacted family/caregiver?: Yes  Were Treatment Team discharge recommendations reviewed with patient/caregiver?: Yes  Did patient/caregiver verbalize understanding of patient care needs?: N/A- going to facility  Were patient/caregiver advised of the risks associated with not following Treatment Team discharge recommendations?: Yes    Contacts  Patient Contacts: Nilam Hawthorne (dtr)  Relationship to Patient[de-identified] Family  Contact Method: Phone  Phone Number: [phone]  Reason/Outcome: Continuity of Care, Emergency Contact, Discharge Planning    Other Referral/Resources/Interventions Provided:  Interventions: Short Term Rehab  Referral Comments: [facility] referral sent via A[facility] to [facility]. Admissions confirmed they are holding onto patient's bed; however, new auth is required for his return.  STR referral also sent via Aidin to [facility] to inquire re bed availability per dtr's request.     Treatment Team Recommendation: Short Term Rehab  Discharge Destination Plan[de-identified] Short Term Rehab

## 2023-09-11 NOTE — OCCUPATIONAL THERAPY NOTE
Occupational Therapy Evaluation/Treatment       09/11/23 1340   OT Last Visit   OT Visit Date 09/11/23   Note Type   Note type Evaluation   Pain Assessment   Pain Assessment Tool Hooper-Baker FACES   Hooper-Baker FACES Pain Rating 4   Pain Location/Orientation Orientation: Bilateral;Location: Leg  (hamstring area)   Restrictions/Precautions   Other Precautions Cognitive; Chair Alarm; Bed Alarm; Fall Risk;Pain   Home Living   Type of Home SNF  (Admitted from [facility])   Additional Comments per chart pt found slouched in wheelchair   Prior Function   Level of [place] Needs assistance with IADLS;Needs assistance with functional mobility; Needs assistance with ADLs  (per transfer form can toilet, transfer and feed with help)   Lives With Facility staff   Receives Help From Personal care attendant   IADLs Family/Friend/Other provides transportation; Family/Friend/Other provides meals; Family/Friend/Other provides medication management   ADL   Eating Assistance 4  Minimal Assistance   Grooming Assistance 4  Minimal Assistance   UB Bathing Assistance 3  Moderate Assistance   LB Bathing Assistance 2  Maximal Assistance   UB Dressing Assistance 3  Moderate Assistance   LB Dressing Assistance 2  Maximal Assistance   Toileting Assistance  2  Maximal Assistance   Bed Mobility   Supine to Sit 2  Maximal assistance   Additional items Assist x 1;Verbal cues   Transfers   Sit to Stand   (mod/max assist)   Additional items Assist x 1;Verbal cues   Stand to Sit 3  Moderate assistance   Additional items Assist x 1;Verbal cues   Functional Mobility   Functional Mobility   (mod/max assist)   Additional Comments bed to commode with RW   Balance   Static Sitting Fair -   Dynamic Sitting Poor +   Static Standing Poor   Dynamic Standing Poor   Activity Tolerance   Activity Tolerance   (cognition)   RUE Assessment   RUE Assessment WFL   LUE Assessment   LUE Assessment WFL   Cognition   Overall Cognitive Status Impaired Arousal/Participation Cooperative   Attention Attends with cues to redirect   Orientation Level Disoriented to place; Disoriented to time;Disoriented to situation;Oriented to person   Following Commands Follows one step commands with increased time or repetition   Assessment   Limitation Decreased ADL status; Decreased UE strength;Decreased Safe judgement during ADL;Decreased cognition;Decreased endurance;Decreased high-level ADLs; Decreased self-care trans  (decreased balance and mobility)   Prognosis Good   Assessment Patient evaluated by Occupational Therapy. Patient admitted with Sepsis ([address] The patients occupational profile, medical and therapy history includes a extensive additional review of physical, cognitive, or psychosocial history related to current functional performance. Comorbidities affecting functional mobility and ADLS include: anxiety, depression and hypertension. Prior to admission, patient was requiring assist for functional mobility with walker, requiring assist for ADLS and requiring assist for IADLS. The evaluation identifies the following performance deficits: weakness, impaired balance, decreased endurance, increased fall risk, new onset of impairment of functional mobility, decreased ADLS, decreased IADLS, pain, decreased activity tolerance, decreased safety awareness, impaired judgement, decreased cognition and decreased strength, that result in activity limitations and/or participation restrictions. This evaluation requires clinical decision making of high complexity, because the patient presents with comorbidites that affect occupational performance and required significant modification of tasks or assistance with consideration of multiple treatment options. The Barthel Index was used as a functional outcome tool presenting with a score of Barthel Index Score: 10, indicating marked limitations of functional mobility and ADLS.   The patient's raw score on the AM-PAC Daily Activity Inpatient Short Form is 11. A raw score of less than 19 suggests the patient may benefit from discharge to post-acute rehabilitation services. Please refer to the recommendation of the Occupational Therapist for safe discharge planning. Patient will benefit from skilled Occupational Therapy services to address above deficits and facilitate a safe return to prior level of function. Goals   Patient Goals unable to state due to cognitive impairment   STG Time Frame   (1-7 days)   Short Term Goal  Goals established to promote Patient Goals: unable to state due to cognitive impairment:  Eating: supervision; Grooming: supervision seated; Bathing: mod assist; Upper Body Dressing min assist; Lower Body Dressing: mod assist; Toileting: mod assist; Patient will increase ambulatory commode transfer to min assist with rolling walker to increase performance and safety with ADLS and functional mobility; Patient will increase standing tolerance to 3 minutes during ADL task to decrease assistance level and decrease fall risk; Patient will increase bed mobility to mod assist in preparation for ADLS and transfers; Patient will increase functional mobility to and from bathroom with rolling walker with mod assist to increase performance with ADLS and to use a toilet; Patient will tolerate 5 minutes of UE ROM/strengthening to increase general activity tolerance and performance in ADLS/IADLS; Patient will improve functional activity tolerance to 10 minutes of sustained functional tasks to increase participation in basic self-care and decrease assistance level;  Patient will increase dynamic sitting balance to fair- to improve the ability to sit at edge of bed or on a chair for ADLS;  Patient will increase dynamic standing balance to poor+ to improve postural stability and decrease fall risk during standing ADLS and transfers. LTG Time Frame   (8-14 days)   Long Term Goal Eating: independent; Grooming: independent seated;  Bathing: min assist; Upper Body Dressing supervision; Lower Body Dressing: min assist; Toileting: min assist; Patient will increase ambulatory commode transfer to supervision with rolling walker to increase performance and safety with ADLS and functional mobility; Patient will increase standing tolerance to 6 minutes during ADL task to decrease assistance level and decrease fall risk; Patient will increase bed mobility to min assist in preparation for ADLS and transfers; Patient will increase functional mobility to and from bathroom with rolling walker with min assist to increase performance with ADLS and to use a toilet; Patient will tolerate 10 minutes of UE ROM/strengthening to increase general activity tolerance and performance in ADLS/IADLS; Patient will improve functional activity tolerance to 20 minutes of sustained functional tasks to increase participation in basic self-care and decrease assistance level;  Patient will increase dynamic sitting balance to fair to improve the ability to sit at edge of bed or on a chair for ADLS;  Patient will increase dynamic standing balance to fair- to improve postural stability and decrease fall risk during standing ADLS and transfers. Pt will score >/= 15/24 on AM-PAC Daily Activity Inpatient scale to promote safe independence with ADLs and functional mobility; Pt will score >/= 40/100 on Barthel Index in order to decrease caregiver assistance needed and increase ability to perform ADLs and functional mobility. Plan   Treatment Interventions ADL retraining;Functional transfer training; Endurance training;UE strengthening/ROM; Patient/family training;Equipment evaluation/education; Activityengagement; Compensatory technique education;Cognitive reorientation   Goal Expiration Date 09/25/23   OT Frequency 3-5x/wk   Recommendation   OT Discharge Recommendation Return to facility with rehabilitation services   AM-[facility] Daily Activity Inpatient   Lower Body Dressing 1   Bathing 1 Toileting 1   Upper Body Dressing 2   Grooming 3   Eating 3   Daily Activity Raw Score 11   Daily Activity Standardized Score (Calc for Raw Score >=11) 29.04   AM-PAC Applied Cognition Inpatient   Following a Speech/Presentation 1   Understanding Ordinary Conversation 3   Taking Medications 1   Remembering Where Things Are Placed or Put Away 1   Remembering List of 4-5 Errands 1   Taking Care of Complicated Tasks 1   Applied Cognition Raw Score 8   Applied Cognition Standardized Score 19.32   Barthel Index   Feeding 5   Bathing 0   Grooming Score 0   Dressing Score 0   Bladder Score 0   Bowels Score 0   Toilet Use Score 5   Transfers (Bed/Chair) Score 0   Mobility (Level Surface) Score 0   Stairs Score 0   Barthel Index Score 10   Additional Treatment Session   Start Time 1330   End Time 1340   Treatment Assessment S: FACES 4/10 B hamstring areas O: Completed commode transfer with mod assist.  Functional mobility commode to chair with max assist with verbal cues with use of RW. Stand to sit max assist.  Seated pt completed face washing with setup. Pt incontinent of loose BM, dependent for hygiene. A: Patient is generally weak and decondition however alert and cooperative. Patient able to follow commands 75% of the time. Little verbalization during session.   P: [id number] [facility] Number  Cheli Yun [id number] [facility] OTR/L [id number]

## 2023-09-11 NOTE — PROGRESS NOTES
Daily Progress Note - [address] Richardson 80 y.o. male MRN: [id number]  Unit/Bed#: [address] [place] [address] Encounter: [id number]  Admitting Physician: Betty Celeste MD   PCP: No primary care provider on file. Date of Admission:  9/10/2023  4:43 PM    Assessment and Plan    * Sepsis [facility])  Assessment & Plan  Present on admission as evidenced by tachycardia and leukocytosis. Patient having diarrheal illness which is CTA abdomen pelvis showed mild diffuse thickening of rectal sigmoid colon likely colitis. Patient started on ceftriaxone and Flagyl IV. Patient received 1 L bolus of normal saline in the ED. UA was negative. Stool cultures and C. difficile negative. Chest x-ray observed minimal bibasilar atelectasis. · Blood cultures were drawn after given abx in the ED. · Continue Rocephin and Flagyl  · Monitor fever curve and CBC  · Patient tolerating diet cleared by speech and swallow  · IV fluids discontinued      Colitis  Assessment & Plan  No abdominal tenderness on palpation. But noted large stools and CTAP suggestive of colitis. · Continue on Rocephin and Flagyl  · Monitor stools    Elevated serum creatinine  Assessment & Plan  Present on arrival patient's creatinine found to be elevated at 1.31. Chart review patient's baseline calculated to be around 0.97-1.07. Of note patient's urine microscope he showed granular casts. Patient is status post IV bolus and drip. Patient currently able to maintain diet. · Consider IV fluids if creatinine elevates further. · Daily BMP      Acute metabolic encephalopathy  Assessment & Plan  Prior to admission patient had an episode of witnessed unresponsiveness at the nursing home. Patient was found slouching in his wheelchair and vomited small digested food. On admission patient's mentation improved with no chief complaint. Patient was noted to have a loose BM in the ED.  CTAP showed mild diffuse thickening of rectosigmoid colon concerning for colitis. CT head demonstrated no acute intracranial abnormalities. Patient's stool studies such as C. difficile were negative. Suspected due to sepsis in setting of colitis versus orthostasis in setting of vasovagal/large diarrheal bowel movement on blood pressure medications. · Holding home oxybutynin 5 mg hs and prazosin 1 mg hs due to anticholinergic properties  · Patient continued on IV Rocephin and Flagyl  · Continue to monitor neurological status    Hypertension  Assessment & Plan  Prior to admission, as per [facility], home meds include Losartan 100 mg daily and Norvasc 2.5 mg daily. Per daughter, patient losartan 75 mg daily. · Cardiology following  · Increase Norvasc to 5 mg daily and add metoprolol XL 25 mg daily  · Continue holding losartan in setting of sepsis and elevated creatinine  · Monitor vitals    CAD (coronary artery disease)  Assessment & Plan  Continue Lipitor    Hyperlipidemia  Assessment & Plan  Continue Lipitor 5 mg daily    Depression with anxiety  Assessment & Plan  · Continue on home Zoloft 50 mg    NSVT (nonsustained ventricular tachycardia) ([address])  Assessment & Plan  During admission patient while on telemetry was found to have a 14 beat run of nonsustained ventricular tachycardia. Patient does have a previous cardiac history such as coronary artery disease and questionable murmur. · Cardiology consulted  · Patient is scheduled for Lexiscan stress test  · Continue home medication of Norvasc 5 mg as well as start metoprolol 25 mg  · Continue to hold losartan    Compression fracture of vertebral column (HCC)  Assessment & Plan  Recent CT showed age-indeterminate compression deformities of T11 and L1 1 vertebrae. Likely to be chronic. Patient denies of any tenderness.      • Continue Tylenol and lidocaine patch as needed  • PT/OT as tolerated    Abnormal CT scan  Assessment & Plan  CT abdomen and pelvis on admission showed several acute and chronic pathologies as listed below. · Persistent small opacity in the left lower lobe similar to prior examination may reflect atelectasis and/or scarring. · Cholelithiasis without evidence of cholecystitis. · Subacute to chronic fractures of the right lateral eighth through 10th ribs seen in retrospect on prior exam. Stable chronic T11 and L1 compression deformities. · There is a left lateral hernia containing a portion of a loop of descending colon in the ninth and 10th left intercostal space (series 2, image 81). · One or more simple renal cyst(s) is noted. In addition, there are hypodensities too small to characterize. No hydronephrosis    Continue to follow-up, finding are stable. VTE Pharmacologic Prophylaxis: VTE Score: 5 High Risk (Score >/= 5) - Pharmacological DVT Prophylaxis Ordered: enoxaparin (Lovenox). Sequential Compression Devices Ordered. Patient Centered Rounds: I have performed bedside rounds with nursing staff today. Discussions with Specialists or Other Care Team Provider: Cardiology    Education and Discussions with Family / Patient:   Patient Information Sharing: With the consent of Jaci Vogel , their loved ones, Abena Resendiz, were notified today by inpatient team of the patient’s condition and current plan. All questions answered. Time Spent for Care: 30 minutes. More than 50% of total time spent on counseling and coordination of care as described above. Current Length of Stay: 1 day(s)    Current Patient Status: Inpatient   Certification Statement: The patient will continue to require additional inpatient hospital stay due to Cardiac work-up and continued monitoring     Discharge Plan: We will DC 48 to 72 hours    Code Status: Level 1 - Full Code    Subjective:   Patient seen and examined at bedside. Patient reported some bowel movement last night however cannot verify with the nurses.   Patient denies any shortness of breath, chest pain, nausea, vomiting, fever, chills, lightheadedness, dizziness. Patient was at baseline mentality as compared to previous day. Objective     Objective:   Vitals:   Temp (24hrs), Av.3 °F (36.8 °C), Min:97.9 °F (36.6 °C), Max:98.9 °F (37.2 °C)    Temp:  [97.9 °F (36.6 °C)-98.9 °F (37.2 °C)] 97.9 °F (36.6 °C)  HR:  [67-98] 67  Resp:  [16-18] 18  BP: (121-159)/(54-93) 149/68  SpO2:  [96 %-100 %] 96 %  Body mass index is 23.8 kg/m². Input and Output Summary (last 24 hours): Intake/Output Summary (Last 24 hours) at 2023 1643  Last data filed at 2023 1235  Gross per 24 hour   Intake 430 ml   Output 1050 ml   Net -620 ml       Physical Exam:   Physical Exam  Vitals reviewed. Constitutional:       General: He is not in acute distress. Appearance: Normal appearance. HENT:      Head: Normocephalic and atraumatic. Nose: Nose normal.   Eyes:      Conjunctiva/sclera: Conjunctivae normal.   Cardiovascular:      Rate and Rhythm: Normal rate and regular rhythm. Pulses: Normal pulses. Heart sounds: Normal heart sounds. No murmur heard. Pulmonary:      Effort: Pulmonary effort is normal. No respiratory distress. Breath sounds: Normal breath sounds. No wheezing. Abdominal:      General: Bowel sounds are normal. There is no distension. Palpations: Abdomen is soft. Tenderness: There is no abdominal tenderness. There is no right CVA tenderness, left CVA tenderness, guarding or rebound. Skin:     General: Skin is warm and dry. Capillary Refill: Capillary refill takes less than 2 seconds. Neurological:      Mental Status: He is alert and oriented to person, place, and time. Mental status is at baseline.    Psychiatric:         Mood and Affect: Mood normal.         Behavior: Behavior normal.         Additional Data:     Labs:  Results from last 7 days   Lab Units 23  0512   WBC Thousand/uL 9.86   HEMOGLOBIN g/dL 9.8*   HEMATOCRIT % 31.8* PLATELETS Thousands/uL 196   NEUTROS PCT % 59   LYMPHS PCT % 28   MONOS PCT % 10   EOS PCT % 2     Results from last 7 days   Lab Units 09/12/23  0512 09/11/23  0515 09/10/23  1722   POTASSIUM mmol/L 3.8   < > 4.4   CHLORIDE mmol/L 107   < > 101   CO2 mmol/L 24   < > 22   BUN mg/dL 19   < > 23   CREATININE mg/dL 1.09   < > 1.31*   CALCIUM mg/dL 8.2*   < > 8.9   ALK PHOS U/L  --   --  65   ALT U/L  --   --  16   AST U/L  --   --  22    < > = values in this interval not displayed. Results from last 7 days   Lab Units 09/10/23  1731   POC GLUCOSE mg/dl 163*           * I Have Reviewed All Lab Data Listed Above. * Additional Pertinent Lab Tests Reviewed: [facility] Admission Reviewed    Imaging:    Imaging Reports Reviewed Today Include:   CT abdomen pelvis with contrast   Final Result      1. Persistent small opacity in the left lower lobe similar to prior examination may reflect atelectasis and/or scarring. 2.  Cholelithiasis without evidence of cholecystitis. 3.  Mild diffuse thickening of the rectosigmoid colon which may be due to nonspecific colitis. 4.  Other findings as above. Workstation performed: [id number]         CT head without contrast   Final Result      No acute intracranial abnormality. Chronic microangiopathic changes. Workstation performed: [id number]         XR chest 1 view portable   Final Result      Minimal bibasilar atelectasis. Workstation performed: [id number]           Imaging Personally Reviewed by Myself Includes: Please see above    Recent Cultures (last 7 days):     Results from last 7 days   Lab Units 09/11/23  0156 09/10/23  1724   BLOOD CULTURE  No Growth at 24 hrs. No Growth at 24 hrs.   --    C DIFF TOXIN B BY PCR   --  Negative       Last 24 Hours Medication List:   Current Facility-Administered Medications   Medication Dose Route Frequency Provider Last Rate   • acetaminophen  650 mg Oral Q6H  [address], MD     • [START ON 9/13/2023] amLODIPine  5 mg Oral Daily LIZA Vieira     • cefTRIAXone  1,000 mg Intravenous Q24H Nadia Cleary MD 1,000 mg (09/11/23 7151)   • enoxaparin  40 mg Subcutaneous Daily Nadia Cleary MD     • lidocaine  1 patch Topical Daily Nadia Cleary MD     • melatonin  3 mg Oral HS Nadia Cleary MD     • metoprolol succinate  25 mg Oral HS LIZA Vieira     • metroNIDAZOLE  500 mg Intravenous [facility]  mg (09/12/23 1435)   • sertraline  50 mg Oral Daily Nadia Cleary MD               ** Please Note: Dictation voice to text software may have been used in the creation of this document.  **    Stephanie Delgado MD  09/12/23  4:43 PM

## 2023-09-11 NOTE — ASSESSMENT & PLAN NOTE
Present on arrival patient's creatinine found to be elevated at 1.31. Chart review patient's baseline calculated to be around 0.97-1.07. Of note patient's urine microscope he showed granular casts. Patient is status post IV bolus and drip. Patient currently able to maintain diet. · Consider IV fluids if creatinine elevates further.   · Daily BMP

## 2023-09-11 NOTE — ASSESSMENT & PLAN NOTE
Prior to admission, as per [facility] home, home meds include Losartan 100 mg daily and Norvasc 2.5 mg daily. Per daughter, patient losartan 75 mg daily.      · Cardiology following  · Increase Norvasc to 5 mg daily and add metoprolol XL 25 mg daily  · Continue holding losartan in setting of sepsis and elevated creatinine  · Monitor vitals

## 2023-09-11 NOTE — ASSESSMENT & PLAN NOTE
Resolved . Present on admission as evidenced by tachycardia and leukocytosis. Patient having diarrheal illness which is CTA abdomen pelvis showed mild diffuse thickening of rectal sigmoid colon likely colitis. Patient started on ceftriaxone and Flagyl IV. Patient received 1 L bolus of normal saline in the ED. UA was negative. Stool cultures and C. difficile negative. Chest x-ray observed minimal bibasilar atelectasis. · Blood cultures were drawn after given abx in the ED.   · Negative at 48 hours  · Continue Rocephin and Flagyl  · On discharge continue monotherapy with Flagyl for 10 days  · Monitor fever curve and CBC  · Patient tolerating diet cleared by speech and swallow  · IV fluids discontinued

## 2023-09-11 NOTE — PHYSICAL THERAPY NOTE
PHYSICAL THERAPY EVALUATION/TREATMENT     09/11/23 1320   PT Last Visit   PT Visit Date 09/11/23   Note Type   Note type Evaluation   Pain Assessment   Pain Assessment Tool Hooper-Baker FACES   Hooper-Baker FACES Pain Rating 4  (Bilateral hamstring areas with stretching)   Restrictions/Precautions   Other Precautions Cognitive; Chair Alarm; Bed Alarm; Fall Risk;Pain   Home Living   Type of Home SNF  (Admitted from [address] [place] [address])   Jacqueline Holder   Additional Comments Patient is a poor historian unable to offer information at this time. As per documentation patient was requiring assist for transfers and gait activity   Prior Function   Lives With Facility staff   Receives Help From Personal care attendant   IADLs Family/Friend/Other provides transportation; Family/Friend/Other provides meals; Family/Friend/Other provides medication management   Falls in the last 6 months 1 to 4   Comments Patient appears to be a resident of long-term care   General   Additional Pertinent History Chart reviewed, patient admitted with sepsis. As per documentation patient was found slumped over in a wheelchair at long-term [facility] prior to admission.   On past admissions to the hospital patient was requiring assist for short distance gait and transfers   Family/Caregiver Present No   Cognition   Overall Cognitive Status Impaired   Arousal/Participation Cooperative   Attention Attends with cues to redirect   Orientation Level Oriented to person   Following Commands Follows one step commands inconsistently   Subjective   Subjective Patient with limited verbalization also appears to be hard of hearing   RLE Assessment   RLE Assessment   (Range of motion within functional limits, strength 3+/5)   LLE Assessment   LLE Assessment   (Range of motion within functional limits, strength 3+/5)   Coordination   Movements are Fluid and Coordinated 0   Bed Mobility   Supine to Sit 2  Maximal assistance   Additional items Assist x 1 Additional Comments Patient sitting out of bed in bedside chair at end of session   Transfers   Sit to Stand   (Mod to max)   Additional items Assist x 1;Verbal cues   Stand to Sit 3  Moderate assistance   Additional items Assist x 1;Verbal cues   Ambulation/Elevation   Gait Assistance   (Mod to max)   Additional items Assist x 1;Verbal cues; Tactile cues   Assistive Device Rolling walker   Distance 10 feet with max assist at times for weight shifting to advance lower extremities. Verbal cueing required on a consistent basis and walker guidance also required   Balance   Static Sitting Fair -   Dynamic Sitting Poor +   Static Standing Poor   Dynamic Standing Poor   Ambulatory Poor   Activity Tolerance   Activity Tolerance Patient limited by fatigue; Other (Comment)  (Limited by weakness)   Nurse Made Aware Yes   Assessment   Prognosis Good   Problem List Decreased strength;Decreased range of motion;Decreased endurance; Impaired balance;Decreased mobility; Decreased coordination;Pain; Impaired hearing   Assessment Patient seen for Physical Therapy evaluation. Patient admitted with Sepsis ([address] Comorbidities affecting patient's physical performance include: . Personal factors affecting patient at time of initial evaluation include: ambulating with assistive device, inability to ambulate household distances, inability to navigate community distances, inability to navigate level surfaces without external assistance, inability to perform dynamic tasks in community, inability to perform physical activity, inability to perform ADLS and inability to perform IADLS . Prior to admission, patient was requiring assist for functional mobility with walker, requiring assist for ADLS, requiring assist for IADLS and a resident of long term care.   Please find objective findings from Physical Therapy assessment regarding body systems outlined above with impairments and limitations including weakness, decreased ROM, impaired balance, decreased endurance, impaired coordination, gait deviations, pain, decreased activity tolerance, decreased functional mobility tolerance and fall risk. The Barthel Index was used as a functional outcome tool presenting with a score of Barthel Index Score: 10 today indicating marked limitations of functional mobility and ADLS. Patient's clinical presentation is currently unstable/unpredictable as seen in patient's presentation of vital sign response, changing level of pain, increased fall risk, new onset of impairment of functional mobility, decreased endurance and new onset of weakness. Pt would benefit from continued Physical Therapy treatment to address deficits as defined above and maximize level of functional mobility. As demonstrated by objective findings, the assigned level of complexity for this evaluation is high. The patient's [facility] Basic Mobility Inpatient Short Form Raw Score is 11. A Raw score of less than or equal to 16 suggests the patient may benefit from discharge to post-acute rehabilitation services. Please also refer to the recommendation of the Physical Therapist for safe discharge planning. Goals   Patient Goals None stated at this time Little verbalization   STG Expiration Date 09/18/23   Short Term Goal #1 Transfers and gait with roller walker with mod assist of 1   Short Term Goal #2 Strength bilateral lower extremities 3+/4 -, gait endurance to 30 feet   LTG Expiration Date 09/25/23   Long Term Goal #1 Transfers and gait with roller walker with min assist   Long Term Goal #2 Gait endurance to 50 feet   Plan   Treatment/Interventions ADL retraining;Functional transfer training;LE strengthening/ROM; Therapeutic exercise; Endurance training;Patient/family training;Equipment eval/education; Bed mobility;Gait training; Compensatory technique education   PT Frequency 3-5x/wk   Recommendation   PT Discharge Recommendation Return to facility with rehabilitation services   [facility] Basic Mobility yes Inpatient   Turning in Flat Bed Without Bedrails 2   Lying on Back to Sitting on Edge of Flat Bed Without Bedrails 2   Moving Bed to Chair 2   Standing Up From Chair Using Arms 2   Walk in Room 2   Climb 3-5 Stairs With Railing 1   Basic Mobility Inpatient Raw Score 11   Basic Mobility Standardized Score 30.25   Highest Level Of Mobility   [facility] Goal 4: Move to chair/commode   -HL Achieved 6: Walk 10 steps or more   Barthel Index   Feeding 5   Bathing 0   Grooming Score 0   Dressing Score 0   Bladder Score 0   Bowels Score 0   Toilet Use Score 5   Transfers (Bed/Chair) Score 5   Mobility (Level Surface) Score 0   Stairs Score 0   Barthel Index Score 15   Additional Treatment Session   Start Time 1305   End Time 1320   Treatment Assessment S: Pain noted in bilateral hamstrings with stretching O: Bilateral lower extremity exercise completed as listed below. Gait training with roller walker to and from commode with mod to max assist and cueing for proper walker usage A: Patient will benefit from continued physical therapy with progression as tolerated and increasing functional mobility with clinical staff as well. Patient would benefit from continued PT upon return to SNF   Exercises   Hamstring Stretch Sitting;5 reps;PROM; Bilateral   Heelslides Supine;10 reps;AAROM; Bilateral   Hip Flexion Sitting;5 reps;AAROM; Bilateral  (Alternating)   Hip Abduction Supine;5 reps;AAROM; Bilateral   Heel Cord Stretch Supine;5 reps;PROM; Bilateral   Balance training  Sidestepping and backward walking completed for balance and coordination   Licensure   [place] License Number  Leola Otoole, PT 4 0 [id number] [id number] right lower extremity findings/left lower extremity findings

## 2023-09-11 NOTE — ASSESSMENT & PLAN NOTE
On admission patient met sepsis criteria likely due to colitis however throughout admission patient's vital signs normalized. And able to tolerate oral diet and is asymptomatic. We will follow-up final stool culture.     · Blood cultures negative at 48 hours  · Continue Rocephin and Flagyl  · On discharge continue monotherapy with Flagyl for 10 days

## 2023-09-11 NOTE — ASSESSMENT & PLAN NOTE
Prior to admission patient had an episode of witnessed unresponsiveness at the nursing home. Patient was found slouching in his wheelchair and vomited small digested food. On admission patient's mentation improved with no chief complaint. Patient was noted to have a loose BM in the ED. CTAP showed mild diffuse thickening of rectosigmoid colon concerning for colitis. CT head demonstrated no acute intracranial abnormalities. Patient's stool studies such as C. difficile were negative. Suspected due to sepsis in setting of colitis versus orthostasis in setting of vasovagal/large diarrheal bowel movement on blood pressure medications.     · Holding home oxybutynin 5 mg hs and prazosin 1 mg hs due to anticholinergic properties  · Patient continued on IV Rocephin and Flagyl  · We will switch to Flagyl p.o.  · Continue to monitor neurological status

## 2023-09-11 NOTE — PLAN OF CARE
Problem: Potential for Falls  Goal: Patient will remain free of falls  Description: INTERVENTIONS:  - Educate patient/family on patient safety including physical limitations  - Instruct patient to call for assistance with activity   - Consult OT/PT to assist with strengthening/mobility   - Keep Call bell within reach  - Keep bed low and locked with side rails adjusted as appropriate  - Keep care items and personal belongings within reach  - Initiate and maintain comfort rounds  - Make Fall Risk Sign visible to staff  - Offer Toileting every 2 Hours, in advance of need  - Initiate/Maintain bed alarm  - Obtain necessary fall risk management equipment: yellow socks  - Apply yellow socks and bracelet for high fall risk patients  - Consider moving patient to room near nurses station  Outcome: Progressing     Problem: Prexisting or High Potential for Compromised Skin Integrity  Goal: Skin integrity is maintained or improved  Description: INTERVENTIONS:  - Identify patients at risk for skin breakdown  - Assess and monitor skin integrity  - Assess and monitor nutrition and hydration status  - Monitor labs   - Assess for incontinence   - Turn and reposition patient  - Assist with mobility/ambulation  - Relieve pressure over bony prominences  - Avoid friction and shearing  - Provide appropriate hygiene as needed including keeping skin clean and dry  - Evaluate need for skin moisturizer/barrier cream  - Collaborate with interdisciplinary team   - Patient/family teaching  - Consider wound care consult   Outcome: Progressing     Problem: MOBILITY - ADULT  Goal: Maintain or return to baseline ADL function  Description: INTERVENTIONS:  -  Assess patient's ability to carry out ADLs; assess patient's baseline for ADL function and identify physical deficits which impact ability to perform ADLs (bathing, care of mouth/teeth, toileting, grooming, dressing, etc.)  - Assess/evaluate cause of self-care deficits   - Assess range of motion  - Assess patient's mobility; develop plan if impaired  - Assess patient's need for assistive devices and provide as appropriate  - Encourage maximum independence but intervene and supervise when necessary  - Involve family in performance of ADLs  - Assess for home care needs following discharge   - Consider OT consult to assist with ADL evaluation and planning for discharge  - Provide patient education as appropriate  Outcome: Progressing  Goal: Maintains/Returns to pre admission functional level  Description: INTERVENTIONS:  - Perform BMAT or MOVE assessment daily.   - Set and communicate daily mobility goal to care team and patient/family/caregiver. - Collaborate with rehabilitation services on mobility goals if consulted  - Perform Range of Motion 2 times a day. - Reposition patient every 2 hours.   - Dangle patient 2 times a day  - Stand patient 2 times a day  - Ambulate patient 2 times a day  - Out of bed to chair 2 times a day   - Out of bed for meals 2 times a day  - Out of bed for toileting  - Record patient progress and toleration of activity level   Outcome: Progressing

## 2023-09-11 NOTE — ASSESSMENT & PLAN NOTE
CT abdomen and pelvis on admission showed several acute and chronic pathologies as listed below. 1. Persistent small opacity in the left lower lobe similar to prior examination may reflect atelectasis and/or scarring. 2. Cholelithiasis without evidence of cholecystitis. 3. Subacute to chronic fractures of the right lateral eighth through 10th ribs seen in retrospect on prior exam. Stable chronic T11 and L1 compression deformities. 4. There is a left lateral hernia containing a portion of a loop of descending colon in the ninth and 10th left intercostal space (series 2, image 81). 5. One or more simple renal cyst(s) is noted. In addition, there are hypodensities too small to characterize. No hydronephrosis    · Continue to follow-up, findings are stable.

## 2023-09-12 PROBLEM — I47.29 NSVT (NONSUSTAINED VENTRICULAR TACHYCARDIA) (HCC): Status: ACTIVE | Noted: 2023-09-12

## 2023-09-12 LAB
ANION GAP SERPL CALCULATED.3IONS-SCNC: 6 MMOL/L
BASOPHILS # BLD AUTO: 0.09 THOUSANDS/ÂΜL (ref 0–0.1)
BASOPHILS NFR BLD AUTO: 1 % (ref 0–1)
BUN SERPL-MCNC: 19 MG/DL (ref 5–25)
CALCIUM SERPL-MCNC: 8.2 MG/DL (ref 8.4–10.2)
CARDIAC TROPONIN I PNL SERPL HS: 23 NG/L (ref 8–18)
CHLORIDE SERPL-SCNC: 107 MMOL/L (ref 96–108)
CO2 SERPL-SCNC: 24 MMOL/L (ref 21–32)
CREAT SERPL-MCNC: 1.09 MG/DL (ref 0.6–1.3)
EOSINOPHIL # BLD AUTO: 0.21 THOUSAND/ÂΜL (ref 0–0.61)
EOSINOPHIL NFR BLD AUTO: 2 % (ref 0–6)
ERYTHROCYTE [DISTWIDTH] IN BLOOD BY AUTOMATED COUNT: 14.6 % (ref 11.6–15.1)
GFR SERPL CREATININE-BSD FRML MDRD: 57 ML/MIN/1.73SQ M
GLUCOSE SERPL-MCNC: 100 MG/DL (ref 65–140)
HCT VFR BLD AUTO: 31.8 % (ref 36.5–49.3)
HGB BLD-MCNC: 9.8 G/DL (ref 12–17)
IMM GRANULOCYTES # BLD AUTO: 0.04 THOUSAND/UL (ref 0–0.2)
IMM GRANULOCYTES NFR BLD AUTO: 0 % (ref 0–2)
LYMPHOCYTES # BLD AUTO: 2.73 THOUSANDS/ÂΜL (ref 0.6–4.47)
LYMPHOCYTES NFR BLD AUTO: 28 % (ref 14–44)
MCH RBC QN AUTO: 31.3 PG (ref 26.8–34.3)
MCHC RBC AUTO-ENTMCNC: 30.8 G/DL (ref 31.4–37.4)
MCV RBC AUTO: 102 FL (ref 82–98)
MONOCYTES # BLD AUTO: 0.98 THOUSAND/ÂΜL (ref 0.17–1.22)
MONOCYTES NFR BLD AUTO: 10 % (ref 4–12)
MRSA NOSE QL CULT: NORMAL
NEUTROPHILS # BLD AUTO: 5.81 THOUSANDS/ÂΜL (ref 1.85–7.62)
NEUTS SEG NFR BLD AUTO: 59 % (ref 43–75)
NRBC BLD AUTO-RTO: 0 /100 WBCS
PLATELET # BLD AUTO: 196 THOUSANDS/UL (ref 149–390)
PMV BLD AUTO: 12.5 FL (ref 8.9–12.7)
POTASSIUM SERPL-SCNC: 3.8 MMOL/L (ref 3.5–5.3)
RBC # BLD AUTO: 3.13 MILLION/UL (ref 3.88–5.62)
SODIUM SERPL-SCNC: 137 MMOL/L (ref 135–147)
WBC # BLD AUTO: 9.86 THOUSAND/UL (ref 4.31–10.16)

## 2023-09-12 PROCEDURE — 80048 BASIC METABOLIC PNL TOTAL CA: CPT

## 2023-09-12 PROCEDURE — 99223 1ST HOSP IP/OBS HIGH 75: CPT | Performed by: INTERNAL MEDICINE

## 2023-09-12 PROCEDURE — 92610 EVALUATE SWALLOWING FUNCTION: CPT

## 2023-09-12 PROCEDURE — 85025 COMPLETE CBC W/AUTO DIFF WBC: CPT

## 2023-09-12 PROCEDURE — 84484 ASSAY OF TROPONIN QUANT: CPT

## 2023-09-12 PROCEDURE — 99233 SBSQ HOSP IP/OBS HIGH 50: CPT | Performed by: STUDENT IN AN ORGANIZED HEALTH CARE EDUCATION/TRAINING PROGRAM

## 2023-09-12 PROCEDURE — 93005 ELECTROCARDIOGRAM TRACING: CPT

## 2023-09-12 RX ORDER — METOPROLOL SUCCINATE 25 MG/1
25 TABLET, EXTENDED RELEASE ORAL
Status: DISCONTINUED | OUTPATIENT
Start: 2023-09-12 | End: 2023-09-14 | Stop reason: HOSPADM

## 2023-09-12 RX ORDER — AMLODIPINE BESYLATE 5 MG/1
5 TABLET ORAL DAILY
Status: DISCONTINUED | OUTPATIENT
Start: 2023-09-13 | End: 2023-09-14 | Stop reason: HOSPADM

## 2023-09-12 RX ADMIN — AMLODIPINE BESYLATE 2.5 MG: 2.5 TABLET ORAL at 08:34

## 2023-09-12 RX ADMIN — METRONIDAZOLE 500 MG: 500 INJECTION, SOLUTION INTRAVENOUS at 21:04

## 2023-09-12 RX ADMIN — METRONIDAZOLE 500 MG: 500 INJECTION, SOLUTION INTRAVENOUS at 05:35

## 2023-09-12 RX ADMIN — METRONIDAZOLE 500 MG: 500 INJECTION, SOLUTION INTRAVENOUS at 14:35

## 2023-09-12 RX ADMIN — ENOXAPARIN SODIUM 40 MG: 40 INJECTION SUBCUTANEOUS at 08:34

## 2023-09-12 RX ADMIN — LIDOCAINE 5% 1 PATCH: 700 PATCH TOPICAL at 08:34

## 2023-09-12 RX ADMIN — Medication 3 MG: at 21:05

## 2023-09-12 RX ADMIN — CEFTRIAXONE 1000 MG: 1 INJECTION, SOLUTION INTRAVENOUS at 22:56

## 2023-09-12 RX ADMIN — SERTRALINE HYDROCHLORIDE 50 MG: 50 TABLET ORAL at 08:34

## 2023-09-12 NOTE — PLAN OF CARE
Problem: Potential for Falls  Goal: Patient will remain free of falls  Description: INTERVENTIONS:  - Educate patient/family on patient safety including physical limitations  - Instruct patient to call for assistance with activity   - Consult OT/PT to assist with strengthening/mobility   - Keep Call bell within reach  - Keep bed low and locked with side rails adjusted as appropriate  - Keep care items and personal belongings within reach  - Initiate and maintain comfort rounds  - Make Fall Risk Sign visible to staff  - Offer Toileting every 2 Hours, in advance of need  - Initiate/Maintain bed alarm  - Obtain necessary fall risk management equipment: yellow socks  - Apply yellow socks and bracelet for high fall risk patients  - Consider moving patient to room near nurses station  9/12/2023 0127 by Mario Alberto Guzman RN  Outcome: Progressing  9/12/2023 0126 by Mario Alberto Guzman RN  Outcome: Progressing     Problem: Prexisting or High Potential for Compromised Skin Integrity  Goal: Skin integrity is maintained or improved  Description: INTERVENTIONS:  - Identify patients at risk for skin breakdown  - Assess and monitor skin integrity  - Assess and monitor nutrition and hydration status  - Monitor labs   - Assess for incontinence   - Turn and reposition patient  - Assist with mobility/ambulation  - Relieve pressure over bony prominences  - Avoid friction and shearing  - Provide appropriate hygiene as needed including keeping skin clean and dry  - Evaluate need for skin moisturizer/barrier cream  - Collaborate with interdisciplinary team   - Patient/family teaching  - Consider wound care consult   9/12/2023 0127 by Mario Alberto Guzman RN  Outcome: Progressing  9/12/2023 0126 by Mario Alberto Guzman RN  Outcome: Progressing     Problem: MOBILITY - ADULT  Goal: Maintain or return to baseline ADL function  Description: INTERVENTIONS:  -  Assess patient's ability to carry out ADLs; assess patient's baseline for ADL function and identify physical deficits which impact ability to perform ADLs (bathing, care of mouth/teeth, toileting, grooming, dressing, etc.)  - Assess/evaluate cause of self-care deficits   - Assess range of motion  - Assess patient's mobility; develop plan if impaired  - Assess patient's need for assistive devices and provide as appropriate  - Encourage maximum independence but intervene and supervise when necessary  - Involve family in performance of ADLs  - Assess for home care needs following discharge   - Consider OT consult to assist with ADL evaluation and planning for discharge  - Provide patient education as appropriate  9/12/2023 0127 by Eun Guillaume RN  Outcome: Progressing  9/12/2023 0126 by Eun Guillaume RN  Outcome: Progressing     Problem: MOBILITY - ADULT  Goal: Maintains/Returns to pre admission functional level  Description: INTERVENTIONS:  - Perform BMAT or MOVE assessment daily.   - Set and communicate daily mobility goal to care team and patient/family/caregiver. - Collaborate with rehabilitation services on mobility goals if consulted  - Perform Range of Motion 2 times a day. - Reposition patient every 2 hours.   - Dangle patient 2 times a day  - Stand patient 2 times a day  - Ambulate patient 2 times a day  - Out of bed to chair 2 times a day   - Out of bed for meals 2 times a day  - Out of bed for toileting  - Record patient progress and toleration of activity level   9/12/2023 0127 by Eun Guillaume RN  Outcome: Progressing  9/12/2023 0126 by Eun Guillaume RN  Outcome: Progressing     Problem: CARDIOVASCULAR - ADULT  Goal: Maintains optimal cardiac output and hemodynamic stability  Description: INTERVENTIONS:  - Monitor I/O, vital signs and rhythm  - Monitor for S/S and trends of decreased cardiac output  - Administer and titrate ordered vasoactive medications to optimize hemodynamic stability  - Assess quality of pulses, skin color and temperature  - Assess for signs of decreased coronary artery perfusion  - Instruct patient to report change in severity of symptoms  Outcome: Progressing     Problem: CARDIOVASCULAR - ADULT  Goal: Absence of cardiac dysrhythmias or at baseline rhythm  Description: INTERVENTIONS:  - Continuous cardiac monitoring, vital signs, obtain 12 lead EKG if ordered  - Administer antiarrhythmic and heart rate control medications as ordered  - Monitor electrolytes and administer replacement therapy as ordered  Outcome: Progressing     Problem: INFECTION - ADULT  Goal: Absence or prevention of progression during hospitalization  Description: INTERVENTIONS:  - Assess and monitor for signs and symptoms of infection  - Monitor lab/diagnostic results  - Monitor all insertion sites, i.e. indwelling lines, tubes, and drains  - Monitor endotracheal if appropriate and nasal secretions for changes in amount and color  - Edison appropriate cooling/warming therapies per order  - Administer medications as ordered  - Instruct and encourage patient and family to use good hand hygiene technique  - Identify and instruct in appropriate isolation precautions for identified infection/condition  Outcome: Progressing     Problem: INFECTION - ADULT  Goal: Absence of fever/infection during neutropenic period  Description: INTERVENTIONS:  - Monitor WBC    Outcome: Progressing     Problem: SAFETY ADULT  Goal: Patient will remain free of falls  Description: INTERVENTIONS:  - Educate patient/family on patient safety including physical limitations  - Instruct patient to call for assistance with activity   - Consult OT/PT to assist with strengthening/mobility   - Keep Call bell within reach  - Keep bed low and locked with side rails adjusted as appropriate  - Keep care items and personal belongings within reach  - Initiate and maintain comfort rounds  - Make Fall Risk Sign visible to staff  - Offer Toileting every 2 Hours, in advance of need  - Initiate/Maintain bed alarm  - Obtain necessary fall risk management equipment: yellow socks  - Apply yellow socks and bracelet for high fall risk patients  - Consider moving patient to room near nurses station  9/12/2023 0127 by Shanae Capps RN  Outcome: Progressing  9/12/2023 0126 by Shanae Capps RN  Outcome: Progressing     Problem: SAFETY ADULT  Goal: Maintain or return to baseline ADL function  Description: INTERVENTIONS:  -  Assess patient's ability to carry out ADLs; assess patient's baseline for ADL function and identify physical deficits which impact ability to perform ADLs (bathing, care of mouth/teeth, toileting, grooming, dressing, etc.)  - Assess/evaluate cause of self-care deficits   - Assess range of motion  - Assess patient's mobility; develop plan if impaired  - Assess patient's need for assistive devices and provide as appropriate  - Encourage maximum independence but intervene and supervise when necessary  - Involve family in performance of ADLs  - Assess for home care needs following discharge   - Consider OT consult to assist with ADL evaluation and planning for discharge  - Provide patient education as appropriate  9/12/2023 0127 by Shanae Capps RN  Outcome: Progressing  9/12/2023 0126 by Shanae Capps RN  Outcome: Progressing     Problem: SAFETY ADULT  Goal: Maintains/Returns to pre admission functional level  Description: INTERVENTIONS:  - Perform BMAT or MOVE assessment daily.   - Set and communicate daily mobility goal to care team and patient/family/caregiver. - Collaborate with rehabilitation services on mobility goals if consulted  - Perform Range of Motion 2 times a day. - Reposition patient every 2 hours.   - Dangle patient 2 times a day  - Stand patient 2 times a day  - Ambulate patient 2 times a day  - Out of bed to chair 2 times a day   - Out of bed for meals 2 times a day  - Out of bed for toileting  - Record patient progress and toleration of activity level   9/12/2023 0127 by Deanna Roque Jonatan Fernandes RN  Outcome: Progressing  9/12/2023 0126 by Aleksandar Clayton RN  Outcome: Progressing     Problem: DISCHARGE PLANNING  Goal: Discharge to home or other facility with appropriate resources  Description: INTERVENTIONS:  - Identify barriers to discharge w/patient and caregiver  - Arrange for needed discharge resources and transportation as appropriate  - Identify discharge learning needs (meds, wound care, etc.)  - Arrange for interpretive services to assist at discharge as needed  - Refer to Case Management Department for coordinating discharge planning if the patient needs post-hospital services based on physician/advanced practitioner order or complex needs related to functional status, cognitive ability, or social support system  Outcome: Progressing     Problem: Knowledge Deficit  Goal: Patient/family/caregiver demonstrates understanding of disease process, treatment plan, medications, and discharge instructions  Description: Complete learning assessment and assess knowledge base.   Interventions:  - Provide teaching at level of understanding  - Provide teaching via preferred learning methods  Outcome: Progressing

## 2023-09-12 NOTE — CASE MANAGEMENT
[address] [facility] received request for authorization from Care Manager. Authorization request for: SNF  Facility Name: [facility] NPI: [id number]  Facility MD: Dr. Osvaldo Catherine NPI: [id number]  Authorization initiated by contacting insurance: Adrian Hernandez Via: Phone  Pending Reference #: [id number]  Clinicals submitted via:  Fax

## 2023-09-12 NOTE — ASSESSMENT & PLAN NOTE
During admission patient while on telemetry was found to have a 14 beat run of nonsustained ventricular tachycardia. Patient does have a previous cardiac history such as coronary artery disease and questionable murmur.      · Cardiology consulted  · Patient is scheduled for Lexiscan stress test  · Negative Lexiscan stress test  · Continue home medication of Norvasc 5 mg as well as start metoprolol 25 mg  · Continue to hold losartan

## 2023-09-12 NOTE — SPEECH THERAPY NOTE
Speech-Language Pathology Bedside Swallow Evaluation      Patient Name: Jeff Davis    [id number] Date: 9/12/2023     Problem List  Principal Problem:    Sepsis ([address])  Active Problems:    Hypertension    Hyperlipidemia    Acute encephalopathy    Depression with anxiety    CAD (coronary artery disease)    Elevated serum creatinine    Abnormal CT scan    Compression fracture of vertebral column (HCC)    Colitis      Past Medical History  Past Medical History:   Diagnosis Date   • Anxiety    • Atherosclerotic coronary vascular disease    • Depressive disorder    • Hyperlipidemia    • Hypertension        Past Surgical History  History reviewed. No pertinent surgical history. Summary   Pt presented with functional appearing oral and pharyngeal stage swallowing skills with materials administered today. Recommended Diet: regular diet and thin liquids   Recommended Form of Meds: one at a time if large   Aspiration precautions and swallowing strategies: only feed when fully alert and slow rate of feeding  Other Recommendations: Continue frequent oral care        Current Medical Status  Pt is a 89M with history of hypertension, CAD, dyslipidemia, depression, cognitive impairment/Dementia was brought to ED from [facility]) after episode of unresponsiveness . As per the daughter, patient was found unresponsive while sitting and was difficult to arouse. He was noted to have small amount of digested food over him. Patient was initially noted to be tachycardic and hypotensive. On arrival to ED patient was afebrile, alert and responsive. Patient was noted to have loose bowel movement. CT abdomen pelvis with mild diffuse rectosigmoid colonic thickening. Persistent left lower lobe atelectasis/scarring. Cholelithiasis without cholecystitis. Impressions/Plan:   1. Sepsis, POA, resolved  2.  Rectosigmoid colitis  3. Episode of unresponsiveness/possible syncope secondary to hypotension versus vasovagal  4. CAD  5. Hypertension  6. Dyslipidemia  7. Depression/anxiety with likely underlying dementia  • Follow-up cultures, stool studies  • Continue IV Rocephin, Flagyl  • Monitor abdominal exam  • Telemetry, 2D echo  • Monitor blood pressure, orthostasis  • Monitor intake output  • Monitor for urinary retention  • Urine drug screen per family request  • Follow-up CBC, CMP  • Delirium and aspiration precaution  • PT/OT   SLP Swallowing Evaluation also ordered. Current Precautions:  Fall     Allergies:  No known food allergies    Past medical history:  Please see H&P for details    Special Studies:  See above for CT of abd/pelvis of 9/10  9/10 CT of head: No acute intracranial abnormality. Chronic microangiopathic changes. 9/10 CXR:  Minimal bibasilar atelectasis    Social/Education/Vocational Hx:  Pt lives in SNF/ECF    Swallow Information   Current Risks for Dysphagia & Aspiration: AMS  Current Diet: soft/level 3 diet and nectar thick liquids   Baseline Diet: regular diet and thin liquids      Baseline Assessment   Behavior/Cognition: alert  Speech/Language Status: imitated simple commands for oral motor assessment. Reciprocal greeting in [facility]  Patient Positioning: upright in bed  Pain Status/Interventions/Response to Interventions:  No nonverbal indications of pain. Swallow Mechanism Exam  Facial: symmetrical  Labial: WFL  Lingual: WFL  Velum: symmetrical  Mandible: adequate ROM  Dentition: edentulous and upper dentures  Vocal quality:clear/adequate   Respiratory Status: on RA       Consistencies Assessed and Performance   Materials administered included scrambled eggs, Austrian toast, thin liquids (juice & coffee with sugar), pills with nectar thick liquid    Oral Stage:   Mastication was adequate with the materials administered today. Bolus formation and transfer were functional with no significant oral residue noted. No overt s/s reduced oral control.     Pharyngeal Stage: Swallow Mechanics:  Swallowing initiation appeared prompt. Laryngeal rise was palpated and judged to be within functional limits. No coughing, throat clearing, change in vocal quality or respiratory status noted today. Esophageal Concerns: none reported    Summary and Recommendations (see above)    Results Reviewed with: patient and RN     Treatment Recommended: will monitor over a min of 24 hrs to ensure diet tolerance over time (r/o recurrent change in MS)     Dysphagia LTG  -Patient will demonstrate safe and effective oral intake (without overt s/s significant oral/pharyngeal dysphagia including s/s penetration or aspiration) for the highest appropriate diet level across 24-48 hrs    Thank you for this referral.  Please do not hesitate to contact me with any questions or concerns.     [facility]   Speech Pathologist  [place] License [id number]  [place] License [id number]  Available via [facility] Text

## 2023-09-12 NOTE — CASE MANAGEMENT
Case Management Discharge Planning Note    Patient name Miguelina Mouse  Location [address] [address] [facility]-* MRN [id number]  : 1929 Date 2023       Current Admission Date: 9/10/2023  Current Admission Diagnosis:Sepsis [facility])   Patient Active Problem List    Diagnosis Date Noted   • Sepsis ([address]) 2023   • Colitis 2023   • Hypertension 2023   • Hyperlipidemia 2023   • Acute encephalopathy 2023   • Depression with anxiety 2023   • CAD (coronary artery disease) 2023   • Elevated serum creatinine 2023   • Abnormal CT scan 2023   • Compression fracture of vertebral column ([address]) 2023      LOS (days): 1  Geometric Mean LOS (GMLOS) (days): 3.50  Days to GMLOS:2.6     OBJECTIVE:  Risk of Unplanned Readmission Score: 13      Current admission status: Inpatient   Preferred Pharmacy:   [address]-[place]CR[place]  No address on file      Primary Care Provider: No primary care provider on file. Primary Insurance: [id number] REP  Secondary Insurance: [address]    DISCHARGE DETAILS:    Discharge planning discussed with[de-identified] [facility] of Choice: Yes  Comments - Freedom of Choice: SW advised that [place] [facility] is OON with both primary/secondary insurance. Choice is for patient to return back to [facility] for continued STR.   CM contacted family/caregiver?: Yes  Were Treatment Team discharge recommendations reviewed with patient/caregiver?: Yes  Did patient/caregiver verbalize understanding of patient care needs?: N/A- going to facility  Were patient/caregiver advised of the risks associated with not following Treatment Team discharge recommendations?: Yes    Contacts  Patient Contacts: Diana da silva)  Relationship to Patient[de-identified] Family  Contact Method: Phone  Phone Number: [phone]  Reason/Outcome: Continuity of Care, Emergency Contact, Discharge Planning    Other Referral/Resources/Interventions Provided:  Interventions: Short Term Rehab  Referral Comments: CMDS tasked in [place] to submit for STR auth.      Treatment Team Recommendation: Short Term Rehab  Discharge Destination Plan[de-identified] Short Term Rehab

## 2023-09-12 NOTE — ASSESSMENT & PLAN NOTE
Recent CT showed age-indeterminate compression deformities of T11 and L1 1 vertebrae. Likely to be chronic. Patient denies of any tenderness.      • Continue Tylenol and lidocaine patch as needed  • PT/OT as tolerated

## 2023-09-12 NOTE — PLAN OF CARE
Problem: Potential for Falls  Goal: Patient will remain free of falls  Description: INTERVENTIONS:  - Educate patient/family on patient safety including physical limitations  - Instruct patient to call for assistance with activity   - Consult OT/PT to assist with strengthening/mobility   - Keep Call bell within reach  - Keep bed low and locked with side rails adjusted as appropriate  - Keep care items and personal belongings within reach  - Initiate and maintain comfort rounds  - Make Fall Risk Sign visible to staff  - Offer Toileting every 2 Hours, in advance of need  - Initiate/Maintain bed alarm  - Obtain necessary fall risk management equipment: yellow socks  - Apply yellow socks and bracelet for high fall risk patients  - Consider moving patient to room near nurses station  Outcome: Progressing     Problem: Prexisting or High Potential for Compromised Skin Integrity  Goal: Skin integrity is maintained or improved  Description: INTERVENTIONS:  - Identify patients at risk for skin breakdown  - Assess and monitor skin integrity  - Assess and monitor nutrition and hydration status  - Monitor labs   - Assess for incontinence   - Turn and reposition patient  - Assist with mobility/ambulation  - Relieve pressure over bony prominences  - Avoid friction and shearing  - Provide appropriate hygiene as needed including keeping skin clean and dry  - Evaluate need for skin moisturizer/barrier cream  - Collaborate with interdisciplinary team   - Patient/family teaching  - Consider wound care consult   Outcome: Progressing     Problem: MOBILITY - ADULT  Goal: Maintain or return to baseline ADL function  Description: INTERVENTIONS:  -  Assess patient's ability to carry out ADLs; assess patient's baseline for ADL function and identify physical deficits which impact ability to perform ADLs (bathing, care of mouth/teeth, toileting, grooming, dressing, etc.)  - Assess/evaluate cause of self-care deficits   - Assess range of motion  - Assess patient's mobility; develop plan if impaired  - Assess patient's need for assistive devices and provide as appropriate  - Encourage maximum independence but intervene and supervise when necessary  - Involve family in performance of ADLs  - Assess for home care needs following discharge   - Consider OT consult to assist with ADL evaluation and planning for discharge  - Provide patient education as appropriate  Outcome: Progressing  Goal: Maintains/Returns to pre admission functional level  Description: INTERVENTIONS:  - Perform BMAT or MOVE assessment daily.   - Set and communicate daily mobility goal to care team and patient/family/caregiver. - Collaborate with rehabilitation services on mobility goals if consulted  - Perform Range of Motion 2 times a day. - Reposition patient every 2 hours.   - Dangle patient 2 times a day  - Stand patient 2 times a day  - Ambulate patient 2 times a day  - Out of bed to chair 2 times a day   - Out of bed for meals 2 times a day  - Out of bed for toileting  - Record patient progress and toleration of activity level   Outcome: Progressing     Problem: CARDIOVASCULAR - ADULT  Goal: Maintains optimal cardiac output and hemodynamic stability  Description: INTERVENTIONS:  - Monitor I/O, vital signs and rhythm  - Monitor for S/S and trends of decreased cardiac output  - Administer and titrate ordered vasoactive medications to optimize hemodynamic stability  - Assess quality of pulses, skin color and temperature  - Assess for signs of decreased coronary artery perfusion  - Instruct patient to report change in severity of symptoms  Outcome: Progressing  Goal: Absence of cardiac dysrhythmias or at baseline rhythm  Description: INTERVENTIONS:  - Continuous cardiac monitoring, vital signs, obtain 12 lead EKG if ordered  - Administer antiarrhythmic and heart rate control medications as ordered  - Monitor electrolytes and administer replacement therapy as ordered  Outcome: Progressing     Problem: INFECTION - ADULT  Goal: Absence or prevention of progression during hospitalization  Description: INTERVENTIONS:  - Assess and monitor for signs and symptoms of infection  - Monitor lab/diagnostic results  - Monitor all insertion sites, i.e. indwelling lines, tubes, and drains  - Monitor endotracheal if appropriate and nasal secretions for changes in amount and color  - [place] appropriate cooling/warming therapies per order  - Administer medications as ordered  - Instruct and encourage patient and family to use good hand hygiene technique  - Identify and instruct in appropriate isolation precautions for identified infection/condition  Outcome: Progressing  Goal: Absence of fever/infection during neutropenic period  Description: INTERVENTIONS:  - Monitor WBC    Outcome: Progressing     Problem: SAFETY ADULT  Goal: Patient will remain free of falls  Description: INTERVENTIONS:  - Educate patient/family on patient safety including physical limitations  - Instruct patient to call for assistance with activity   - Consult OT/PT to assist with strengthening/mobility   - Keep Call bell within reach  - Keep bed low and locked with side rails adjusted as appropriate  - Keep care items and personal belongings within reach  - Initiate and maintain comfort rounds  - Make Fall Risk Sign visible to staff  - Offer Toileting every 2 Hours, in advance of need  - Initiate/Maintain bed alarm  - Obtain necessary fall risk management equipment: yellow socks  - Apply yellow socks and bracelet for high fall risk patients  - Consider moving patient to room near nurses station  Outcome: Progressing  Goal: Maintain or return to baseline ADL function  Description: INTERVENTIONS:  -  Assess patient's ability to carry out ADLs; assess patient's baseline for ADL function and identify physical deficits which impact ability to perform ADLs (bathing, care of mouth/teeth, toileting, grooming, dressing, etc.)  - Assess/evaluate cause of self-care deficits   - Assess range of motion  - Assess patient's mobility; develop plan if impaired  - Assess patient's need for assistive devices and provide as appropriate  - Encourage maximum independence but intervene and supervise when necessary  - Involve family in performance of ADLs  - Assess for home care needs following discharge   - Consider OT consult to assist with ADL evaluation and planning for discharge  - Provide patient education as appropriate  Outcome: Progressing  Goal: Maintains/Returns to pre admission functional level  Description: INTERVENTIONS:  - Perform BMAT or MOVE assessment daily.   - Set and communicate daily mobility goal to care team and patient/family/caregiver. - Collaborate with rehabilitation services on mobility goals if consulted  - Perform Range of Motion 2 times a day. - Reposition patient every 2 hours.   - Dangle patient 2 times a day  - Stand patient 2 times a day  - Ambulate patient 2 times a day  - Out of bed to chair 2 times a day   - Out of bed for meals 2 times a day  - Out of bed for toileting  - Record patient progress and toleration of activity level   Outcome: Progressing     Problem: DISCHARGE PLANNING  Goal: Discharge to home or other facility with appropriate resources  Description: INTERVENTIONS:  - Identify barriers to discharge w/patient and caregiver  - Arrange for needed discharge resources and transportation as appropriate  - Identify discharge learning needs (meds, wound care, etc.)  - Arrange for interpretive services to assist at discharge as needed  - Refer to Case Management Department for coordinating discharge planning if the patient needs post-hospital services based on physician/advanced practitioner order or complex needs related to functional status, cognitive ability, or social support system  Outcome: Progressing

## 2023-09-12 NOTE — CONSULTS
Consultation - Cardiology   Miguelina Bolton 80 y.o. male MRN: [id number]  Unit/Bed#: 9[facility] 418-01 Encounter: [id number]    Assessment/Plan     Assessment:  1. Nonsustained ventricular tachycardia  2. Hypertension  3. Altered mental status concern for dementia  4. Coronary artery disease      Plan:  Patient has been admitted to the service of [facility]  1. High-sensitivity troponins were negative and twelve-lead EKG demonstrates first-degree AV heart block without acute ST or T wave changes. 2.  We will add low-dose Toprol-XL 25 mg at bedtime due to nonsustained ventricular tachycardia. 3.  Echocardiogram performed and demonstrates a normal EF with mild MR and TR and slightly elevated PA pressures at 35 mmHg. 4.  Will increase patient's Norvasc back to 5 mg that he was taking prehospital for blood pressure and for antianginal effect. 5.  Antibiotics per primary team    6. Consider geriatric evaluation in the outpatient setting regarding his waxing and waning mental status      History of Present Illness   Physician Requesting Consult: Juanito Alvarenga MD  Reason for Consult / Principal Problem: Abnormal EKG      HPI: Miguelina Bolton is a 80y.o. year old male who was admitted on 9/10/2023 after presenting from [facility] with change in mental status. Of note this is the second admission in 2 months for encephalopathy and change in mental status. Per the nursing home patient was found sitting unresponsive by family member in his wheelchair. He was tachycardic on arrival to the emergency room with a heart rate of 106. Heart rates since admission to the hospital have ranged from 70-98. Patient unfortunately speaks predominantly Chris. And watching his interaction with the nursing staff he does appear to have some level of cognitive impairment.   Reason for consultation is nonspecific ST segment changes and nonsustained ventricular tachycardia. Patient had echocardiogram on 9/11/2023 which demonstrated an EF of 55% with grade 1 diastolic dysfunction. Patient was noted to have mild MR and TR with a peak PA pressure of 35 mmHg. High-sensitivity troponins were 17 and 15 on admission. Per nursing staff patient has not offered any complaints of chest pain or dizziness. Patient has history significant for hypertension, dyslipidemia, depression, anxiety, and coronary artery disease. Inpatient consult to Cardiology  Consult performed by: LIZA Wilkes  Consult ordered by: Brissa Garcia MD          Review of Systems   Unable to perform ROS: Dementia       Historical Information   Past Medical History:   Diagnosis Date   • Anxiety    • Atherosclerotic coronary vascular disease    • Depressive disorder    • Hyperlipidemia    • Hypertension      History reviewed. No pertinent surgical history. Social History     Substance and Sexual Activity   Alcohol Use Not Currently     Social History     Substance and Sexual Activity   Drug Use Never     E-Cigarette/Vaping   • E-Cigarette Use Never User      E-Cigarette/Vaping Substances     Social History     Tobacco Use   Smoking Status Never   Smokeless Tobacco Never     Family History: History reviewed. No pertinent family history.     Meds/Allergies   all current active meds have been reviewed, current meds:   Current Facility-Administered Medications   Medication Dose Route Frequency   • acetaminophen (TYLENOL) tablet 650 mg  650 mg Oral Q6H PRN   • amLODIPine (NORVASC) tablet 2.5 mg  2.5 mg Oral Daily   • cefTRIAXone (ROCEPHIN) IVPB (premix in dextrose) 1,000 mg 50 mL  1,000 mg Intravenous Q24H   • enoxaparin (LOVENOX) subcutaneous injection 40 mg  40 mg Subcutaneous Daily   • lidocaine (LIDODERM) 5 % patch 1 patch  1 patch Topical Daily   • melatonin tablet 3 mg  3 mg Oral HS   • metroNIDAZOLE (FLAGYL) IVPB (premix) 500 mg 100 mL  500 mg Intravenous Q8H   • sertraline (ZOLOFT) tablet 50 mg  50 mg Oral Daily    and PTA meds:   Prior to Admission Medications   Prescriptions Last Dose Informant Patient Reported? Taking?   acetaminophen (TYLENOL) 325 mg tablet 9/10/2023  No Yes   Sig: Take 2 tablets (650 mg total) by mouth every 6 (six) hours as needed for mild pain   amLODIPine (NORVASC) 2.5 mg tablet 9/10/2023  No Yes   Sig: Take 1 tablet (2.5 mg total) by mouth daily Do not start before August 1, 2023. atorvastatin (LIPITOR) 10 mg tablet 9/10/2023  Yes Yes   Sig: Take 5 mg by mouth daily   docusate sodium (COLACE) 100 mg capsule 9/10/2023  No Yes   Sig: Take 1 capsule (100 mg total) by mouth 2 (two) times a day   lidocaine (LIDODERM) 5 % 9/10/2023  Yes Yes   Sig: Apply 1 patch topically daily Remove & Discard patch within 12 hours or as directed by MD   losartan (COZAAR) 100 MG tablet 9/10/2023  No Yes   Sig: Take 1 tablet (100 mg total) by mouth daily Do not start before August 1, 2023. magnesium hydroxide (MILK OF MAGNESIA) 400 mg/5 mL oral suspension 9/10/2023  Yes Yes   Sig: Take by mouth daily as needed for constipation   melatonin 3 mg 9/10/2023  No Yes   Sig: Take 1 tablet (3 mg total) by mouth daily at bedtime   oxybutynin (DITROPAN) 5 mg tablet 9/10/2023  Yes Yes   Sig: Take 5 mg by mouth every other day   polyethylene glycol (MIRALAX) 17 g packet 9/10/2023  Yes Yes   Sig: Take 17 g by mouth daily   prazosin (MINIPRESS) 1 mg capsule 9/10/2023  Yes Yes   Sig: Take 1 mg by mouth daily at bedtime   sertraline (ZOLOFT) 50 mg tablet 9/10/2023  Yes Yes   Sig: Take 50 mg by mouth daily      Facility-Administered Medications: None     No Known Allergies    Objective   Vitals: Blood pressure 159/93, pulse 98, temperature 98.4 °F (36.9 °C), resp. rate 18, height 5' 10" (1.778 m), weight 75.3 kg (165 lb 14.4 oz), SpO2 98 %.   Orthostatic Blood Pressures    Flowsheet Row Most Recent Value   Blood Pressure 159/93 filed at 09/12/2023 6576   Patient Position - Orthostatic VS Lying filed at 09/12/2023 0227            Intake/Output Summary (Last 24 hours) at 9/12/2023 1108  Last data filed at 9/12/2023 0900  Gross per 24 hour   Intake 310 ml   Output 1050 ml   Net -740 ml       Invasive Devices     Peripheral Intravenous Line  Duration           Peripheral IV 09/10/23 Right Antecubital 1 day          Drain  Duration           External Urinary Catheter Small 1 day                Physical Exam  Vitals and nursing note reviewed. Constitutional:       General: He is not in acute distress. Appearance: Normal appearance. He is normal weight. HENT:      Left Ear: External ear normal.      Nose: Nose normal.   Eyes:      General: No scleral icterus. Right eye: No discharge. Left eye: No discharge. Cardiovascular:      Rate and Rhythm: Normal rate and regular rhythm. Extrasystoles (PACs) are present. Pulmonary:      Effort: Pulmonary effort is normal. No accessory muscle usage or respiratory distress. Breath sounds: Examination of the right-lower field reveals decreased breath sounds. Examination of the left-lower field reveals decreased breath sounds. Decreased breath sounds present. Abdominal:      General: Bowel sounds are normal. There is no distension. Palpations: Abdomen is soft. Musculoskeletal:      Right lower leg: No edema. Left lower leg: No edema. Skin:     General: Skin is warm and dry. Capillary Refill: Capillary refill takes less than 2 seconds. Neurological:      General: No focal deficit present. Mental Status: He is alert. Mental status is at baseline. Psychiatric:         Mood and Affect: Mood normal.         Behavior: Behavior is agitated. Cognition and Memory: Cognition is impaired. Judgment: Judgment is impulsive. Lab Results:   I have personally reviewed pertinent lab results.     CBC with diff:   Results from last 7 days   Lab Units 09/12/23  0512   WBC Thousand/uL 9.86   RBC Million/uL 3.13*   HEMOGLOBIN g/dL 9.8*   HEMATOCRIT % 31.8*   MCV fL 102*   MCH pg 31.3   MCHC g/dL 30.8*   RDW % 14.6   MPV fL 12.5   PLATELETS Thousands/uL 196     CMP:   Results from last 7 days   Lab Units 09/12/23  0512 09/11/23  0515 09/10/23  1722   SODIUM mmol/L 137   < > 135   CHLORIDE mmol/L 107   < > 101   CO2 mmol/L 24   < > 22   BUN mg/dL 19   < > 23   CREATININE mg/dL 1.09   < > 1.31*   CALCIUM mg/dL 8.2*   < > 8.9   AST U/L  --   --  22   ALT U/L  --   --  16   ALK PHOS U/L  --   --  65   EGFR ml/min/1.73sq m 57   < > 46    < > = values in this interval not displayed. HS Troponin:   0   Lab Value Date/Time    HSTNI0 17 09/10/2023 1722    HSTNI2 15 09/10/2023 1914    HSTNI4 18 07/24/2023 0643     BNP:   Results from last 7 days   Lab Units 09/12/23  0512   POTASSIUM mmol/L 3.8   CHLORIDE mmol/L 107   CO2 mmol/L 24   BUN mg/dL 19   CREATININE mg/dL 1.09   CALCIUM mg/dL 8.2*   EGFR ml/min/1.73sq m 57     Magnesium:   Results from last 7 days   Lab Units 09/10/23  1722   MAGNESIUM mg/dL 1.9     Lipid Profile:        Imaging: I have personally reviewed pertinent reports. EKG: Initial twelve-lead EKG demonstrated sinus rhythm with first-degree AV heart block, and artifact as baseline. Telemetry reviewed, patient has been sinus rhythm with first-degree AV heart block with occasional PVC. On evening of admission patient did have a 10 beat run of nonsustained ventricular tachycardia. He has had no further episodes since that time    Repeat twelve-lead EKG today demonstrates sinus rhythm, first-degree AV heart block with DC interval of 0.28. No ST changes.     VTE Prophylaxis: Sequential compression device Docvivi Li)     Code Status: Level 1 - Full Code  Advance Directive and Living Will:      Power of :    POLST:      [address] [facility]  Cardiology

## 2023-09-13 ENCOUNTER — APPOINTMENT (INPATIENT)
Dept: RADIOLOGY | Facility: HOSPITAL | Age: 88
DRG: 871 | End: 2023-09-13
Payer: COMMERCIAL

## 2023-09-13 ENCOUNTER — APPOINTMENT (INPATIENT)
Dept: NON INVASIVE DIAGNOSTICS | Facility: HOSPITAL | Age: 88
DRG: 871 | End: 2023-09-13
Payer: COMMERCIAL

## 2023-09-13 PROBLEM — R79.89 ELEVATED SERUM CREATININE: Status: RESOLVED | Noted: 2023-07-24 | Resolved: 2023-09-13

## 2023-09-13 PROBLEM — A41.9 SEPSIS (HCC): Status: RESOLVED | Noted: 2023-09-11 | Resolved: 2023-09-13

## 2023-09-13 LAB
ANION GAP SERPL CALCULATED.3IONS-SCNC: 7 MMOL/L
BASOPHILS # BLD AUTO: 0.08 THOUSANDS/ÂΜL (ref 0–0.1)
BASOPHILS NFR BLD AUTO: 1 % (ref 0–1)
BUN SERPL-MCNC: 12 MG/DL (ref 5–25)
CALCIUM SERPL-MCNC: 8.3 MG/DL (ref 8.4–10.2)
CHEST PAIN STATEMENT: NORMAL
CHLORIDE SERPL-SCNC: 106 MMOL/L (ref 96–108)
CO2 SERPL-SCNC: 24 MMOL/L (ref 21–32)
CREAT SERPL-MCNC: 0.85 MG/DL (ref 0.6–1.3)
EOSINOPHIL # BLD AUTO: 0.22 THOUSAND/ÂΜL (ref 0–0.61)
EOSINOPHIL NFR BLD AUTO: 3 % (ref 0–6)
ERYTHROCYTE [DISTWIDTH] IN BLOOD BY AUTOMATED COUNT: 14.6 % (ref 11.6–15.1)
GFR SERPL CREATININE-BSD FRML MDRD: 74 ML/MIN/1.73SQ M
GLUCOSE SERPL-MCNC: 106 MG/DL (ref 65–140)
HCT VFR BLD AUTO: 32.1 % (ref 36.5–49.3)
HGB BLD-MCNC: 10.2 G/DL (ref 12–17)
IMM GRANULOCYTES # BLD AUTO: 0.03 THOUSAND/UL (ref 0–0.2)
IMM GRANULOCYTES NFR BLD AUTO: 0 % (ref 0–2)
LYMPHOCYTES # BLD AUTO: 2.55 THOUSANDS/ÂΜL (ref 0.6–4.47)
LYMPHOCYTES NFR BLD AUTO: 34 % (ref 14–44)
MAX DIASTOLIC BP: 73 MMHG
MAX HEART RATE: 103 BPM
MAX HR PERCENT: 81 %
MAX HR: 103 BPM
MAX PREDICTED HEART RATE: 126 BPM
MAX. SYSTOLIC BP: 166 MMHG
MCH RBC QN AUTO: 31.9 PG (ref 26.8–34.3)
MCHC RBC AUTO-ENTMCNC: 31.8 G/DL (ref 31.4–37.4)
MCV RBC AUTO: 100 FL (ref 82–98)
MONOCYTES # BLD AUTO: 0.85 THOUSAND/ÂΜL (ref 0.17–1.22)
MONOCYTES NFR BLD AUTO: 11 % (ref 4–12)
NEUTROPHILS # BLD AUTO: 3.78 THOUSANDS/ÂΜL (ref 1.85–7.62)
NEUTS SEG NFR BLD AUTO: 51 % (ref 43–75)
NRBC BLD AUTO-RTO: 0 /100 WBCS
NUC STRESS EJECTION FRACTION: 64 %
PLATELET # BLD AUTO: 209 THOUSANDS/UL (ref 149–390)
PMV BLD AUTO: 12.2 FL (ref 8.9–12.7)
POTASSIUM SERPL-SCNC: 3.8 MMOL/L (ref 3.5–5.3)
PROTOCOL NAME: NORMAL
RATE PRESSURE PRODUCT: NORMAL
RBC # BLD AUTO: 3.2 MILLION/UL (ref 3.88–5.62)
REASON FOR TERMINATION: NORMAL
SL CV REST NUCLEAR ISOTOPE DOSE: 10 MCI
SL CV STRESS NUCLEAR ISOTOPE DOSE: 32 MCI
SL CV STRESS RECOVERY BP: NORMAL MMHG
SL CV STRESS RECOVERY HR: 88 BPM
SL CV STRESS RECOVERY O2 SAT: 96 %
SODIUM SERPL-SCNC: 137 MMOL/L (ref 135–147)
STRESS ANGINA INDEX: 0
STRESS BASELINE BP: NORMAL MMHG
STRESS BASELINE HR: 76 BPM
STRESS O2 SAT REST: 95 %
STRESS PEAK HR: 95 BPM
STRESS POST ESTIMATED WORKLOAD: 1 METS
STRESS POST EXERCISE DUR MIN: 1 MIN
STRESS POST O2 SAT PEAK: 98 %
STRESS POST PEAK BP: 166 MMHG
STRESS/REST PERFUSION RATIO: 1.05
TARGET HR FORMULA: NORMAL
TEST INDICATION: NORMAL
TIME IN EXERCISE PHASE: NORMAL
WBC # BLD AUTO: 7.51 THOUSAND/UL (ref 4.31–10.16)

## 2023-09-13 PROCEDURE — G1004 CDSM NDSC: HCPCS

## 2023-09-13 PROCEDURE — 78452 HT MUSCLE IMAGE SPECT MULT: CPT

## 2023-09-13 PROCEDURE — A9502 TC99M TETROFOSMIN: HCPCS

## 2023-09-13 PROCEDURE — 93005 ELECTROCARDIOGRAM TRACING: CPT

## 2023-09-13 PROCEDURE — 93017 CV STRESS TEST TRACING ONLY: CPT

## 2023-09-13 PROCEDURE — 93016 CV STRESS TEST SUPVJ ONLY: CPT | Performed by: INTERNAL MEDICINE

## 2023-09-13 PROCEDURE — 78452 HT MUSCLE IMAGE SPECT MULT: CPT | Performed by: INTERNAL MEDICINE

## 2023-09-13 PROCEDURE — 99233 SBSQ HOSP IP/OBS HIGH 50: CPT | Performed by: STUDENT IN AN ORGANIZED HEALTH CARE EDUCATION/TRAINING PROGRAM

## 2023-09-13 PROCEDURE — 80048 BASIC METABOLIC PNL TOTAL CA: CPT

## 2023-09-13 PROCEDURE — 85025 COMPLETE CBC W/AUTO DIFF WBC: CPT

## 2023-09-13 PROCEDURE — 93018 CV STRESS TEST I&R ONLY: CPT | Performed by: INTERNAL MEDICINE

## 2023-09-13 PROCEDURE — 99232 SBSQ HOSP IP/OBS MODERATE 35: CPT | Performed by: INTERNAL MEDICINE

## 2023-09-13 RX ORDER — REGADENOSON 0.08 MG/ML
0.4 INJECTION, SOLUTION INTRAVENOUS ONCE
Status: COMPLETED | OUTPATIENT
Start: 2023-09-13 | End: 2023-09-13

## 2023-09-13 RX ADMIN — LIDOCAINE 5% 1 PATCH: 700 PATCH TOPICAL at 11:21

## 2023-09-13 RX ADMIN — SERTRALINE HYDROCHLORIDE 50 MG: 50 TABLET ORAL at 11:21

## 2023-09-13 RX ADMIN — Medication 3 MG: at 21:23

## 2023-09-13 RX ADMIN — METRONIDAZOLE 500 MG: 500 INJECTION, SOLUTION INTRAVENOUS at 05:14

## 2023-09-13 RX ADMIN — METRONIDAZOLE 500 MG: 500 INJECTION, SOLUTION INTRAVENOUS at 21:39

## 2023-09-13 RX ADMIN — CEFTRIAXONE 1000 MG: 1 INJECTION, SOLUTION INTRAVENOUS at 23:08

## 2023-09-13 RX ADMIN — METRONIDAZOLE 500 MG: 500 INJECTION, SOLUTION INTRAVENOUS at 15:08

## 2023-09-13 RX ADMIN — METOPROLOL SUCCINATE 25 MG: 25 TABLET, EXTENDED RELEASE ORAL at 21:31

## 2023-09-13 RX ADMIN — REGADENOSON 0.4 MG: 0.08 INJECTION, SOLUTION INTRAVENOUS at 09:54

## 2023-09-13 RX ADMIN — ENOXAPARIN SODIUM 40 MG: 40 INJECTION SUBCUTANEOUS at 11:21

## 2023-09-13 RX ADMIN — ACETAMINOPHEN 650 MG: 325 TABLET ORAL at 21:23

## 2023-09-13 RX ADMIN — AMLODIPINE BESYLATE 5 MG: 5 TABLET ORAL at 11:21

## 2023-09-13 NOTE — PLAN OF CARE
Problem: Potential for Falls  Goal: Patient will remain free of falls  Description: INTERVENTIONS:  - Educate patient/family on patient safety including physical limitations  - Instruct patient to call for assistance with activity   - Consult OT/PT to assist with strengthening/mobility   - Keep Call bell within reach  - Keep bed low and locked with side rails adjusted as appropriate  - Keep care items and personal belongings within reach  - Initiate and maintain comfort rounds  - Make Fall Risk Sign visible to staff  - Offer Toileting every 2 Hours, in advance of need  - Initiate/Maintain bed alarm  - Obtain necessary fall risk management equipment: yellow socks  - Apply yellow socks and bracelet for high fall risk patients  - Consider moving patient to room near nurses station  Outcome: Progressing     Problem: Prexisting or High Potential for Compromised Skin Integrity  Goal: Skin integrity is maintained or improved  Description: INTERVENTIONS:  - Identify patients at risk for skin breakdown  - Assess and monitor skin integrity  - Assess and monitor nutrition and hydration status  - Monitor labs   - Assess for incontinence   - Turn and reposition patient  - Assist with mobility/ambulation  - Relieve pressure over bony prominences  - Avoid friction and shearing  - Provide appropriate hygiene as needed including keeping skin clean and dry  - Evaluate need for skin moisturizer/barrier cream  - Collaborate with interdisciplinary team   - Patient/family teaching  - Consider wound care consult   Outcome: Progressing     Problem: MOBILITY - ADULT  Goal: Maintain or return to baseline ADL function  Description: INTERVENTIONS:  -  Assess patient's ability to carry out ADLs; assess patient's baseline for ADL function and identify physical deficits which impact ability to perform ADLs (bathing, care of mouth/teeth, toileting, grooming, dressing, etc.)  - Assess/evaluate cause of self-care deficits   - Assess range of motion  - Assess patient's mobility; develop plan if impaired  - Assess patient's need for assistive devices and provide as appropriate  - Encourage maximum independence but intervene and supervise when necessary  - Involve family in performance of ADLs  - Assess for home care needs following discharge   - Consider OT consult to assist with ADL evaluation and planning for discharge  - Provide patient education as appropriate  Outcome: Progressing  Goal: Maintains/Returns to pre admission functional level  Description: INTERVENTIONS:  - Perform BMAT or MOVE assessment daily.   - Set and communicate daily mobility goal to care team and patient/family/caregiver. - Collaborate with rehabilitation services on mobility goals if consulted  - Perform Range of Motion 2 times a day. - Reposition patient every 2 hours.   - Dangle patient 2 times a day  - Stand patient 2 times a day  - Ambulate patient 2 times a day  - Out of bed to chair 2 times a day   - Out of bed for meals 2 times a day  - Out of bed for toileting  - Record patient progress and toleration of activity level   Outcome: Progressing     Problem: CARDIOVASCULAR - ADULT  Goal: Maintains optimal cardiac output and hemodynamic stability  Description: INTERVENTIONS:  - Monitor I/O, vital signs and rhythm  - Monitor for S/S and trends of decreased cardiac output  - Administer and titrate ordered vasoactive medications to optimize hemodynamic stability  - Assess quality of pulses, skin color and temperature  - Assess for signs of decreased coronary artery perfusion  - Instruct patient to report change in severity of symptoms  Outcome: Progressing  Goal: Absence of cardiac dysrhythmias or at baseline rhythm  Description: INTERVENTIONS:  - Continuous cardiac monitoring, vital signs, obtain 12 lead EKG if ordered  - Administer antiarrhythmic and heart rate control medications as ordered  - Monitor electrolytes and administer replacement therapy as ordered  Outcome: Progressing     Problem: INFECTION - ADULT  Goal: Absence or prevention of progression during hospitalization  Description: INTERVENTIONS:  - Assess and monitor for signs and symptoms of infection  - Monitor lab/diagnostic results  - Monitor all insertion sites, i.e. indwelling lines, tubes, and drains  - Monitor endotracheal if appropriate and nasal secretions for changes in amount and color  - [place] appropriate cooling/warming therapies per order  - Administer medications as ordered  - Instruct and encourage patient and family to use good hand hygiene technique  - Identify and instruct in appropriate isolation precautions for identified infection/condition  Outcome: Progressing  Goal: Absence of fever/infection during neutropenic period  Description: INTERVENTIONS:  - Monitor WBC    Outcome: Progressing     Problem: SAFETY ADULT  Goal: Patient will remain free of falls  Description: INTERVENTIONS:  - Educate patient/family on patient safety including physical limitations  - Instruct patient to call for assistance with activity   - Consult OT/PT to assist with strengthening/mobility   - Keep Call bell within reach  - Keep bed low and locked with side rails adjusted as appropriate  - Keep care items and personal belongings within reach  - Initiate and maintain comfort rounds  - Make Fall Risk Sign visible to staff  - Offer Toileting every 2 Hours, in advance of need  - Initiate/Maintain bed alarm  - Obtain necessary fall risk management equipment: yellow socks  - Apply yellow socks and bracelet for high fall risk patients  - Consider moving patient to room near nurses station  Outcome: Progressing  Goal: Maintain or return to baseline ADL function  Description: INTERVENTIONS:  -  Assess patient's ability to carry out ADLs; assess patient's baseline for ADL function and identify physical deficits which impact ability to perform ADLs (bathing, care of mouth/teeth, toileting, grooming, dressing, etc.)  - Assess/evaluate cause of self-care deficits   - Assess range of motion  - Assess patient's mobility; develop plan if impaired  - Assess patient's need for assistive devices and provide as appropriate  - Encourage maximum independence but intervene and supervise when necessary  - Involve family in performance of ADLs  - Assess for home care needs following discharge   - Consider OT consult to assist with ADL evaluation and planning for discharge  - Provide patient education as appropriate  Outcome: Progressing  Goal: Maintains/Returns to pre admission functional level  Description: INTERVENTIONS:  - Perform BMAT or MOVE assessment daily.   - Set and communicate daily mobility goal to care team and patient/family/caregiver. - Collaborate with rehabilitation services on mobility goals if consulted  - Perform Range of Motion 2 times a day. - Reposition patient every 2 hours.   - Dangle patient 2 times a day  - Stand patient 2 times a day  - Ambulate patient 2 times a day  - Out of bed to chair 2 times a day   - Out of bed for meals 2 times a day  - Out of bed for toileting  - Record patient progress and toleration of activity level   Outcome: Progressing     Problem: DISCHARGE PLANNING  Goal: Discharge to home or other facility with appropriate resources  Description: INTERVENTIONS:  - Identify barriers to discharge w/patient and caregiver  - Arrange for needed discharge resources and transportation as appropriate  - Identify discharge learning needs (meds, wound care, etc.)  - Arrange for interpretive services to assist at discharge as needed  - Refer to Case Management Department for coordinating discharge planning if the patient needs post-hospital services based on physician/advanced practitioner order or complex needs related to functional status, cognitive ability, or social support system  Outcome: Progressing     Problem: Knowledge Deficit  Goal: Patient/family/caregiver demonstrates understanding of disease process, treatment plan, medications, and discharge instructions  Description: Complete learning assessment and assess knowledge base. Interventions:  - Provide teaching at level of understanding  - Provide teaching via preferred learning methods  Outcome: Progressing     Problem: Nutrition/Hydration-ADULT  Goal: Nutrient/Hydration intake appropriate for improving, restoring or maintaining nutritional needs  Description: Monitor and assess patient's nutrition/hydration status for malnutrition. Collaborate with interdisciplinary team and initiate plan and interventions as ordered. Monitor patient's weight and dietary intake as ordered or per policy. Utilize nutrition screening tool and intervene as necessary. Determine patient's food preferences and provide high-protein, high-caloric foods as appropriate.      INTERVENTIONS:  - Monitor oral intake, urinary output, labs, and treatment plans  - Assess nutrition and hydration status and recommend course of action  - Evaluate amount of meals eaten  - Assist patient with eating if necessary   - Allow adequate time for meals  - Recommend/ encourage appropriate diets, oral nutritional supplements, and vitamin/mineral supplements  - Order, calculate, and assess calorie counts as needed  - Recommend, monitor, and adjust tube feedings and TPN/PPN based on assessed needs  - Assess need for intravenous fluids  - Provide specific nutrition/hydration education as appropriate  - Include patient/family/caregiver in decisions related to nutrition  Outcome: Progressing

## 2023-09-13 NOTE — QUICK NOTE
Standard Teaching Supervising Statement     I have reviewed the note performed and documented by the Resident. I personally performed the required components/examined the patient.  I supervised the Resident and I agree with the Resident findings and plan of care during admission with the following additions/exceptions:      Patient seen examined at bedside after stress test with cardiology. Patient was eating at bedside and denies any chest pain and was more vocal with verbalization today. Nursing reported no events overnight. PE:  General- NAD,   HEENT- no nystagmus, perrla  Respi- non-labored,CTA bilaterally  Cardio- no murmor, no rubs, no gallops  Abdomen- soft NT, no rebounding, no guarding  Msk- no BLLE  Psych- cooperative  Neuro-alert to self     A/P:  NSVT- Asymptomatic, negative Lexiscan stress test, continue Metoprolol, Norvasc restarted  Sepsis- [id number]- likely abnl, continue Rocephin/Flagyl  Colitis- Continue Rocephin/Flagyl, transition to po for total of 10d, follow final stool cx,  asymptomatic  AMS- at baseline, likely multifactoral infx, vs vardiac origin, vs situational  HTN- Norvasc and metoprolol  CAD- continue Lipitor  Elevated Cr- resolved  Depression/anxiety-continue home Zoloft  Compression fracture-T11/L1, asymptomatic, topical lidocaine patch  Abnormal CT-opacity left lower lobe, lateral hernia left descending loop:, Asymptomatic, monitor     All other chronic conditions managed per home regiment     Vikki Ramírez M.D, M. Ed.  09/13/23

## 2023-09-13 NOTE — PROGRESS NOTES
Progress Note - Cardiology   [facility]     Daylene Lesch 80 y.o. male MRN: [id number]  : 1929  Unit/Bed#: [address] Encounter: [id number]    Assessment and Plan:   1. Nonsustained ventricular tachycardia: No further episodes overnight of nonsustained ventricular tachycardia    -   Continue Toprol XL 25 mg daily    -   For Lexiscan nuclear stress test today to evaluate for ischemia    2. Hypertension: Continue monitor vital signs    -   Continue Norvasc 5 mg once a day    -   Continue Toprol-XL 25 mg daily    3. Altered mental status concern for dementia: Redirect as needed    4. Coronary artery disease: Continue Toprol-XL 25 mg once a day which was started during this admission    -   For Lexiscan nuclear stress test today to evaluate for ischemia    Subjective / Objective:       Vitals: Blood pressure 154/75, pulse 76, temperature 98.5 °F (36.9 °C), resp. rate 16, height 5' 10" (1.778 m), weight 76.2 kg (168 lb), SpO2 92 %. Vitals:    23 0600 23 0625   Weight: 75.3 kg (165 lb 14.4 oz) 76.2 kg (168 lb)     Body mass index is 24.11 kg/m². BP Readings from Last 3 Encounters:   23 154/75   23 169/89     Orthostatic Blood Pressures    Flowsheet Row Most Recent Value   Blood Pressure 154/75 filed at 2023 0754   Patient Position - Orthostatic VS Lying filed at 2023 0326        I/O        0701   0700  0701   0700  0701   0700    P. O. 120 300     I.V. (mL/kg) 10 (0.1)      IV Piggyback       Total Intake(mL/kg) 130 (1.7) 300 (3.9)     Urine (mL/kg/hr) 1050 (0.6) 800 (0.4)     Stool 0      Total Output 1050 800     Net -920 -500            Unmeasured Stool Occurrence 1 x          Invasive Devices     Peripheral Intravenous Line  Duration           Peripheral IV 09/10/23 Right Antecubital 2 days          Drain  Duration           External Urinary Catheter Small 2 days                  Intake/Output Summary (Last 24 hours) at 9/13/2023 1104  Last data filed at 9/13/2023 0326  Gross per 24 hour   Intake 120 ml   Output 800 ml   Net -680 ml         Physical Exam:   Physical Exam  Vitals and nursing note reviewed. Constitutional:       Appearance: He is normal weight. HENT:      Right Ear: External ear normal.      Left Ear: External ear normal.   Eyes:      General:         Right eye: No discharge. Left eye: No discharge. Cardiovascular:      Rate and Rhythm: Normal rate and regular rhythm. Pulses: Normal pulses. Pulmonary:      Effort: Pulmonary effort is normal. No accessory muscle usage or respiratory distress. Breath sounds: Examination of the right-lower field reveals decreased breath sounds. Examination of the left-lower field reveals decreased breath sounds. Decreased breath sounds present. Abdominal:      General: Bowel sounds are normal. There is no distension. Palpations: Abdomen is soft. Musculoskeletal:      Right lower leg: No edema. Left lower leg: No edema. Skin:     General: Skin is warm and dry. Capillary Refill: Capillary refill takes less than 2 seconds. Neurological:      Mental Status: He is alert. Mental status is at baseline. Psychiatric:         Cognition and Memory: Cognition is impaired.             Medications/ Allergies:     Current Facility-Administered Medications   Medication Dose Route Frequency Provider Last Rate   • acetaminophen  650 mg Oral Q6H PRN Htet Jag Rodriguez MD     • amLODIPine  5 mg Oral Daily LIZA Chow     • cefTRIAXone  1,000 mg Intravenous Q24H Peggy Bullock MD 1,000 mg (09/12/23 5306)   • enoxaparin  40 mg Subcutaneous Daily Peggy Bullock MD     • lidocaine  1 patch Topical Daily Peggy Bullock MD     • melatonin  3 mg Oral HS Peggy Bullock MD     • metoprolol succinate  25 mg Oral HS LIZA Chow     • metroNIDAZOLE  500 mg Intravenous 110 Oneil Valenzuela  mg (09/13/23 3978)   • sertraline  50 mg Oral Daily Htet Shelly Meyer MD       acetaminophen, 650 mg, Q6H PRN      No Known Allergies    VTE Pharmacologic Prophylaxis:   Sequential compression device (Venodyne)     Labs:   Troponins:  Results from last 7 days   Lab Units 09/12/23  1113 09/10/23  1914   HS TNI RAND ng/L 23*  --    HSTNI D2 ng/L  --  -2     CBC with diff:  Results from last 7 days   Lab Units 09/13/23 0447 09/12/23 0512 09/11/23  0515 09/10/23  1722   WBC Thousand/uL 7.51 9.86 11.88* 16.60*   HEMOGLOBIN g/dL 10.2* 9.8* 10.1* 12.9   HEMATOCRIT % 32.1* 31.8* 31.9* 40.9   MCV fL 100* 102* 102* 103*   PLATELETS Thousands/uL 209 196 197 230   RBC Million/uL 3.20* 3.13* 3.14* 3.97   MCH pg 31.9 31.3 32.2 32.5   MCHC g/dL 31.8 30.8* 31.7 31.5   RDW % 14.6 14.6 14.6 14.5   MPV fL 12.2 12.5 12.5 12.2   NRBC AUTO /100 WBCs 0 0 0 0     CMP:  Results from last 7 days   Lab Units 09/13/23 0447 09/12/23 0512 09/11/23  0515 09/10/23  1722   SODIUM mmol/L 137 137 136 135   POTASSIUM mmol/L 3.8 3.8 4.2 4.4   CHLORIDE mmol/L 106 107 107 101   CO2 mmol/L 24 24 24 22   ANION GAP mmol/L 7 6 5 12   BUN mg/dL 12 19 23 23   CREATININE mg/dL 0.85 1.09 1.22 1.31*   GLUCOSE FASTING mg/dL  --   --  110*  --    CALCIUM mg/dL 8.3* 8.2* 7.9* 8.9   AST U/L  --   --   --  22   ALT U/L  --   --   --  16   ALK PHOS U/L  --   --   --  65   TOTAL PROTEIN g/dL  --   --   --  6.9   ALBUMIN g/dL  --   --   --  4.0   TOTAL BILIRUBIN mg/dL  --   --   --  0.69   EGFR ml/min/1.73sq m 74 57 50 46     Magnesium:  Results from last 7 days   Lab Units 09/10/23  1722   MAGNESIUM mg/dL 1.9     Imaging & Testing   I have personally reviewed pertinent reports. Echo complete w/ contrast if indicated    Result Date: 9/11/2023  Narrative: •  Left Ventricle: Left ventricular cavity size is normal. Wall thickness is normal. The left ventricular ejection fraction is 55% by visual estimation. . Systolic function is normal. Wall motion is normal. Diastolic function is mildly abnormal, consistent with grade I (abnormal) relaxation. •  Aortic Valve: There is trace regurgitation. There is aortic valve sclerosis. •  Mitral Valve: There is moderate annular calcification. There is mild regurgitation. •  Tricuspid Valve: There is mild regurgitation. Pulmonary artery pressure around 35 mmHg. XR chest 1 view portable    Result Date: 9/11/2023  Narrative: CHEST INDICATION:   AMS. COMPARISON:  None EXAM PERFORMED/VIEWS:  XR CHEST PORTABLE Images:  1 FINDINGS: Cardiomediastinal silhouette appears unremarkable. Minimal bibasilar atelectasis. No pneumothorax or pleural effusion. Osseous structures appear within normal limits for patient age. Impression: Minimal bibasilar atelectasis. Workstation performed: [id number]     CT abdomen pelvis with contrast    Result Date: 9/10/2023  Narrative: CT ABDOMEN AND PELVIS WITH IV CONTRAST INDICATION: Infection. White count. COMPARISON: CT of the chest abdomen pelvis on July 23, 2023. TECHNIQUE:  CT examination of the abdomen and pelvis was performed. Multiplanar 2D reformatted images were created from the source data. This examination, like all CT scans performed in the [facility], was performed utilizing techniques to minimize radiation dose exposure, including the use of iterative reconstruction and automated exposure control. Radiation dose length product (DLP) for this visit:  652 mGy-cm IV Contrast:  100 mL of iohexol (OMNIPAQUE) Enteric Contrast:  Enteric contrast was not administered. FINDINGS: ABDOMEN LOWER CHEST: Small opacity in the left lower lobe similar to prior examination may reflect atelectasis and/or scarring. LIVER/BILIARY TREE:  Unremarkable. GALLBLADDER:  There a large gallstone within the gallbladder, without pericholecystic inflammatory changes. SPLEEN:  Unremarkable. PANCREAS:  Unremarkable. ADRENAL GLANDS:  Unremarkable. KIDNEYS/URETERS:  One or more simple renal cyst(s) is noted. In addition, there are hypodensities too small to characterize. No hydronephrosis. STOMACH AND BOWEL: No bowel obstruction. There is mild diffuse thickening of the rectosigmoid colon. Low-density fatty stool within the colon. APPENDIX:  No findings to suggest appendicitis. ABDOMINOPELVIC CAVITY:  No ascites. No pneumoperitoneum. No lymphadenopathy. VESSELS: Moderate atherosclerotic calcifications. PELVIS REPRODUCTIVE ORGANS:  Unremarkable for patient's age. URINARY BLADDER:  Unremarkable. ABDOMINAL WALL/INGUINAL REGIONS: There is a left lateral hernia containing a portion of a loop of descending colon in the ninth and 10th left intercostal space (series 2, image 81). OSSEOUS STRUCTURES: Subacute to chronic fractures of the right lateral eighth through 10th ribs seen in retrospect on prior exam. Stable chronic T11 and L1 compression deformities. Impression: 1. Persistent small opacity in the left lower lobe similar to prior examination may reflect atelectasis and/or scarring. 2.  Cholelithiasis without evidence of cholecystitis. 3.  Mild diffuse thickening of the rectosigmoid colon which may be due to nonspecific colitis. 4.  Other findings as above. Workstation performed: [id number]     CT head without contrast    Result Date: 9/10/2023  Narrative: CT BRAIN - WITHOUT CONTRAST INDICATION:   unresponsive. COMPARISON: 7/23/2023 TECHNIQUE:  CT examination of the brain was performed. Multiplanar 2D reformatted images were created from the source data. Radiation dose length product (DLP) for this visit:  1851 mGy-cm . This examination, like all CT scans performed in the [facility], was performed utilizing techniques to minimize radiation dose exposure, including the use of iterative reconstruction and automated exposure control. IMAGE QUALITY:  Diagnostic.  FINDINGS: PARENCHYMA: Decreased attenuation is noted in periventricular and subcortical white matter demonstrating an appearance that is statistically most likely to represent moderate microangiopathic change; this appearance is similar when compared to most recent prior examination. Old lacunar infarcts. No CT signs of acute infarction. No intracranial mass, mass effect or midline shift. No acute parenchymal hemorrhage. VENTRICLES AND EXTRA-AXIAL SPACES:  Normal for the patient's age. VISUALIZED ORBITS: Normal visualized orbits. PARANASAL SINUSES: Normal visualized paranasal sinuses. CALVARIUM AND EXTRACRANIAL SOFT TISSUES:  Normal.     Impression: No acute intracranial abnormality. Chronic microangiopathic changes. Workstation performed: [id number]          [address] [place] [address]       "This note was completed in part utilizing [employer] direct voice recognition software. Grammatical errors, random word insertion, spelling mistakes, and incomplete sentences may be an occasional consequence of the system secondary to software limitations, ambient noise and hardware issues. Please read the chart carefully and recognize, using context, where substitutions have occurred.   If you have any questions or concerns about the context, text or information contained within the body of this dictation, please contact myself, the provider, for further clarification."

## 2023-09-13 NOTE — CASE MANAGEMENT
Received fax from [facility] requesting updated information about IV abx including medication names, dosage, frequency & end date. CM notified.

## 2023-09-13 NOTE — PLAN OF CARE
Problem: Potential for Falls  Goal: Patient will remain free of falls  Description: INTERVENTIONS:  - Educate patient/family on patient safety including physical limitations  - Instruct patient to call for assistance with activity   - Consult OT/PT to assist with strengthening/mobility   - Keep Call bell within reach  - Keep bed low and locked with side rails adjusted as appropriate  - Keep care items and personal belongings within reach  - Initiate and maintain comfort rounds  - Make Fall Risk Sign visible to staff  - Offer Toileting every 2 Hours, in advance of need  - Initiate/Maintain bed alarm  - Obtain necessary fall risk management equipment: yellow socks  - Apply yellow socks and bracelet for high fall risk patients  - Consider moving patient to room near nurses station  Outcome: Progressing     Problem: MOBILITY - ADULT  Goal: Maintain or return to baseline ADL function  Description: INTERVENTIONS:  -  Assess patient's ability to carry out ADLs; assess patient's baseline for ADL function and identify physical deficits which impact ability to perform ADLs (bathing, care of mouth/teeth, toileting, grooming, dressing, etc.)  - Assess/evaluate cause of self-care deficits   - Assess range of motion  - Assess patient's mobility; develop plan if impaired  - Assess patient's need for assistive devices and provide as appropriate  - Encourage maximum independence but intervene and supervise when necessary  - Involve family in performance of ADLs  - Assess for home care needs following discharge   - Consider OT consult to assist with ADL evaluation and planning for discharge  - Provide patient education as appropriate  Outcome: Progressing

## 2023-09-13 NOTE — CASE MANAGEMENT
Called Maite Smith ([phone]) and spoke to [facility] to notify of PO med status as per CM. CM notified.

## 2023-09-13 NOTE — PROGRESS NOTES
Daily Progress Note - [address] Richardson 80 y.o. male MRN: [id number]  Unit/Bed#: [address] [place] [address] Encounter: [id number]  Admitting Physician: Erika Blair MD   PCP: No primary care provider on file. Date of Admission:  9/10/2023  4:43 PM    Assessment and Plan    * Colitis  Assessment & Plan  On admission patient met sepsis criteria likely due to colitis however throughout admission patient's vital signs normalized. And able to tolerate oral diet and is asymptomatic. We will follow-up final stool culture. · Blood cultures negative at 48 hours  · Continue Rocephin and Flagyl  · On discharge continue monotherapy with Flagyl for 10 days    NSVT (nonsustained ventricular tachycardia) ([address])  Assessment & Plan  During admission patient while on telemetry was found to have a 14 beat run of nonsustained ventricular tachycardia. Patient does have a previous cardiac history such as coronary artery disease and questionable murmur. · Cardiology consulted  · Patient is scheduled for Lexiscan stress test  · Negative Lexiscan stress test  · Continue home medication of Norvasc 5 mg as well as start metoprolol 25 mg  · Continue to hold losartan    Acute metabolic encephalopathy  Assessment & Plan  Prior to admission patient had an episode of witnessed unresponsiveness at the nursing home. Patient was found slouching in his wheelchair and vomited small digested food. On admission patient's mentation improved with no chief complaint. Patient was noted to have a loose BM in the ED. CTAP showed mild diffuse thickening of rectosigmoid colon concerning for colitis. CT head demonstrated no acute intracranial abnormalities. Patient's stool studies such as C. difficile were negative. Suspected due to sepsis in setting of colitis versus orthostasis in setting of vasovagal/large diarrheal bowel movement on blood pressure medications.     · Holding home oxybutynin 5 mg hs and prazosin 1 mg hs due to anticholinergic properties  · Patient continued on IV Rocephin and Flagyl  · We will switch to Flagyl p.o.  · Continue to monitor neurological status    Hypertension  Assessment & Plan  Prior to admission, as per [facility] home, home meds include Losartan 100 mg daily and Norvasc 2.5 mg daily. Per daughter, patient losartan 75 mg daily. · Cardiology following  · Increase Norvasc to 5 mg daily and add metoprolol XL 25 mg daily  · Continue holding losartan in setting of sepsis and elevated creatinine  · Monitor vitals    CAD (coronary artery disease)  Assessment & Plan  Continue Lipitor    Hyperlipidemia  Assessment & Plan  Continue Lipitor 5 mg daily    Depression with anxiety  Assessment & Plan  · Continue on home Zoloft 50 mg    Sepsis (HCC)-resolved as of 9/13/2023  Assessment & Plan    Resolved . Present on admission as evidenced by tachycardia and leukocytosis. Patient having diarrheal illness which is CTA abdomen pelvis showed mild diffuse thickening of rectal sigmoid colon likely colitis. Patient started on ceftriaxone and Flagyl IV. Patient received 1 L bolus of normal saline in the ED. UA was negative. Stool cultures and C. difficile negative. Chest x-ray observed minimal bibasilar atelectasis. · Blood cultures were drawn after given abx in the ED. · Negative at 48 hours  · Continue Rocephin and Flagyl  · On discharge continue monotherapy with Flagyl for 10 days  · Monitor fever curve and CBC  · Patient tolerating diet cleared by speech and swallow  · IV fluids discontinued      Elevated serum creatinine-resolved as of 9/13/2023  Assessment & Plan  Present on arrival patient's creatinine found to be elevated at 1.31. Chart review patient's baseline calculated to be around 0.97-1.07. Of note patient's urine microscope he showed granular casts. Patient is status post IV bolus and drip. Patient currently able to maintain diet.     · Consider IV fluids if creatinine elevates further. · Daily BMP      Compression fracture of vertebral column (HCC)  Assessment & Plan  Recent CT showed age-indeterminate compression deformities of T11 and L1 1 vertebrae. Likely to be chronic. Patient denies of any tenderness. • Continue Tylenol and lidocaine patch as needed  • PT/OT as tolerated    Abnormal CT scan  Assessment & Plan  CT abdomen and pelvis on admission showed several acute and chronic pathologies as listed below. 9. Persistent small opacity in the left lower lobe similar to prior examination may reflect atelectasis and/or scarring. 10. Cholelithiasis without evidence of cholecystitis. 11. Subacute to chronic fractures of the right lateral eighth through 10th ribs seen in retrospect on prior exam. Stable chronic T11 and L1 compression deformities. 12. There is a left lateral hernia containing a portion of a loop of descending colon in the ninth and 10th left intercostal space (series 2, image 81). 13. One or more simple renal cyst(s) is noted. In addition, there are hypodensities too small to characterize. No hydronephrosis    · Continue to follow-up, findings are stable. VTE Pharmacologic Prophylaxis: VTE Score: 5 High Risk (Score >/= 5) - Pharmacological DVT Prophylaxis Ordered: enoxaparin (Lovenox). Sequential Compression Devices Ordered. Patient Centered Rounds: I have performed bedside rounds with nursing staff today. Discussions with Specialists or Other Care Team Provider: Cardiology, gastroenterology    Education and Discussions with Family / Patient:   Patient Information Sharing:     Time Spent for Care: 30 minutes. More than 50% of total time spent on counseling and coordination of care as described above.     Current Length of Stay: 2 day(s)    Current Patient Status: Inpatient   Certification Statement: The patient will continue to require additional inpatient hospital stay due to Cardiac stress test and then prior authorization for nursing home    Discharge Plan: Discharged to nursing home after prior authorization    Code Status: Level 1 - Full Code    Subjective:   Patient seen and examined at bedside. Patient reported no acute events overnight. Patient denied any shortness of breath, chest pain, nausea, vomiting, fever, chills, lightheadedness, dizziness. Patient is notably a man of little words however mentation baseline according to daughter on previous exchange. Objective     Objective:   Vitals:   Temp (24hrs), Av.5 °F (36.9 °C), Min:98.3 °F (36.8 °C), Max:98.8 °F (37.1 °C)    Temp:  [98.3 °F (36.8 °C)-98.8 °F (37.1 °C)] 98.8 °F (37.1 °C)  HR:  [65-99] 76  Resp:  [16-20] 18  BP: (102-156)/(63-83) 102/63  SpO2:  [92 %-97 %] 97 %  Body mass index is 24.11 kg/m². Input and Output Summary (last 24 hours): Intake/Output Summary (Last 24 hours) at 2023  Last data filed at 2023 1235  Gross per 24 hour   Intake 300 ml   Output 800 ml   Net -500 ml       Physical Exam:   Physical Exam  Vitals reviewed. Constitutional:       General: He is not in acute distress. Appearance: Normal appearance. HENT:      Head: Normocephalic and atraumatic. Nose: Nose normal.   Eyes:      General: No scleral icterus. Conjunctiva/sclera: Conjunctivae normal.   Cardiovascular:      Rate and Rhythm: Normal rate and regular rhythm. Pulses: Normal pulses. Heart sounds: Normal heart sounds. No murmur heard. Pulmonary:      Effort: Pulmonary effort is normal. No respiratory distress. Breath sounds: Normal breath sounds. No wheezing. Abdominal:      General: Bowel sounds are normal. There is no distension. Palpations: Abdomen is soft. Tenderness: There is no abdominal tenderness. There is no right CVA tenderness, left CVA tenderness, guarding or rebound. Musculoskeletal:      Right lower leg: No edema. Left lower leg: No edema. Skin:     General: Skin is warm and dry. Capillary Refill: Capillary refill takes less than 2 seconds. Neurological:      Mental Status: He is alert and oriented to person, place, and time. Mental status is at baseline. GCS: GCS eye subscore is 4. GCS verbal subscore is 5. GCS motor subscore is 6. Psychiatric:         Mood and Affect: Mood normal.         Behavior: Behavior normal.       Additional Data:     Labs:  Results from last 7 days   Lab Units 09/13/23  0447   WBC Thousand/uL 7.51   HEMOGLOBIN g/dL 10.2*   HEMATOCRIT % 32.1*   PLATELETS Thousands/uL 209   NEUTROS PCT % 51   LYMPHS PCT % 34   MONOS PCT % 11   EOS PCT % 3     Results from last 7 days   Lab Units 09/13/23  0447 09/11/23  0515 09/10/23  1722   POTASSIUM mmol/L 3.8   < > 4.4   CHLORIDE mmol/L 106   < > 101   CO2 mmol/L 24   < > 22   BUN mg/dL 12   < > 23   CREATININE mg/dL 0.85   < > 1.31*   CALCIUM mg/dL 8.3*   < > 8.9   ALK PHOS U/L  --   --  65   ALT U/L  --   --  16   AST U/L  --   --  22    < > = values in this interval not displayed. Results from last 7 days   Lab Units 09/10/23  1731   POC GLUCOSE mg/dl 163*           * I Have Reviewed All Lab Data Listed Above. * Additional Pertinent Lab Tests Reviewed: [address] [place] [address] Admission Reviewed    Imaging:    Imaging Reports Reviewed Today Include:   CT abdomen pelvis with contrast   Final Result      1. Persistent small opacity in the left lower lobe similar to prior examination may reflect atelectasis and/or scarring. 2.  Cholelithiasis without evidence of cholecystitis. 3.  Mild diffuse thickening of the rectosigmoid colon which may be due to nonspecific colitis. 4.  Other findings as above. Workstation performed: [id number]         CT head without contrast   Final Result      No acute intracranial abnormality. Chronic microangiopathic changes. Workstation performed: [id number]         XR chest 1 view portable   Final Result      Minimal bibasilar atelectasis. Workstation performed: [id number]           Imaging Personally Reviewed by Myself Includes: Please see above    Recent Cultures (last 7 days):     Results from last 7 days   Lab Units 09/11/23  0156 09/10/23  1724   BLOOD CULTURE  No Growth at 48 hrs. No Growth at 48 hrs. --    C DIFF TOXIN B BY PCR   --  Negative       Last 24 Hours Medication List:   Current Facility-Administered Medications   Medication Dose Route Frequency Provider Last Rate   • acetaminophen  650 mg Oral Q6H PRN Htet Devi Núñez MD     • amLODIPine  5 mg Oral Daily LIZA Medina     • cefTRIAXone  1,000 mg Intravenous Q24H Yamini Castaneda MD 1,000 mg (09/12/23 2994)   • enoxaparin  40 mg Subcutaneous Daily Yamini Castaneda MD     • lidocaine  1 patch Topical Daily Yamini Castaneda MD     • melatonin  3 mg Oral HS Yamini Castaneda MD     • metoprolol succinate  25 mg Oral HS LIZA Medina     • metroNIDAZOLE  500 mg Intravenous [facility]  mg (09/13/23 5083)   • sertraline  50 mg Oral Daily Yamini Castaneda MD               ** Please Note: Dictation voice to text software may have been used in the creation of this document.  **    Tonja Bocanegra MD  09/13/23  8:12 PM

## 2023-09-14 VITALS
DIASTOLIC BLOOD PRESSURE: 94 MMHG | TEMPERATURE: 98.3 F | OXYGEN SATURATION: 98 % | SYSTOLIC BLOOD PRESSURE: 174 MMHG | RESPIRATION RATE: 18 BRPM | HEART RATE: 71 BPM | BODY MASS INDEX: 24.84 KG/M2 | WEIGHT: 173.5 LBS | HEIGHT: 70 IN

## 2023-09-14 PROBLEM — G93.40 ACUTE ENCEPHALOPATHY: Status: RESOLVED | Noted: 2023-07-24 | Resolved: 2023-09-14

## 2023-09-14 PROBLEM — I47.29 NSVT (NONSUSTAINED VENTRICULAR TACHYCARDIA) (HCC): Status: RESOLVED | Noted: 2023-09-12 | Resolved: 2023-09-14

## 2023-09-14 LAB
ANION GAP SERPL CALCULATED.3IONS-SCNC: 4 MMOL/L
BASOPHILS # BLD AUTO: 0.08 THOUSANDS/ÂΜL (ref 0–0.1)
BASOPHILS NFR BLD AUTO: 1 % (ref 0–1)
BUN SERPL-MCNC: 12 MG/DL (ref 5–25)
CALCIUM SERPL-MCNC: 7.9 MG/DL (ref 8.4–10.2)
CHLORIDE SERPL-SCNC: 107 MMOL/L (ref 96–108)
CO2 SERPL-SCNC: 27 MMOL/L (ref 21–32)
CREAT SERPL-MCNC: 0.88 MG/DL (ref 0.6–1.3)
EOSINOPHIL # BLD AUTO: 0.24 THOUSAND/ÂΜL (ref 0–0.61)
EOSINOPHIL NFR BLD AUTO: 4 % (ref 0–6)
ERYTHROCYTE [DISTWIDTH] IN BLOOD BY AUTOMATED COUNT: 14.5 % (ref 11.6–15.1)
GFR SERPL CREATININE-BSD FRML MDRD: 73 ML/MIN/1.73SQ M
GLUCOSE SERPL-MCNC: 101 MG/DL (ref 65–140)
HCT VFR BLD AUTO: 31 % (ref 36.5–49.3)
HGB BLD-MCNC: 9.7 G/DL (ref 12–17)
IMM GRANULOCYTES # BLD AUTO: 0.02 THOUSAND/UL (ref 0–0.2)
IMM GRANULOCYTES NFR BLD AUTO: 0 % (ref 0–2)
LYMPHOCYTES # BLD AUTO: 2.83 THOUSANDS/ÂΜL (ref 0.6–4.47)
LYMPHOCYTES NFR BLD AUTO: 48 % (ref 14–44)
MCH RBC QN AUTO: 31.4 PG (ref 26.8–34.3)
MCHC RBC AUTO-ENTMCNC: 31.3 G/DL (ref 31.4–37.4)
MCV RBC AUTO: 100 FL (ref 82–98)
MONOCYTES # BLD AUTO: 0.89 THOUSAND/ÂΜL (ref 0.17–1.22)
MONOCYTES NFR BLD AUTO: 15 % (ref 4–12)
NEUTROPHILS # BLD AUTO: 1.93 THOUSANDS/ÂΜL (ref 1.85–7.62)
NEUTS SEG NFR BLD AUTO: 32 % (ref 43–75)
NRBC BLD AUTO-RTO: 0 /100 WBCS
PLATELET # BLD AUTO: 198 THOUSANDS/UL (ref 149–390)
PMV BLD AUTO: 12.1 FL (ref 8.9–12.7)
POTASSIUM SERPL-SCNC: 3.7 MMOL/L (ref 3.5–5.3)
RBC # BLD AUTO: 3.09 MILLION/UL (ref 3.88–5.62)
SODIUM SERPL-SCNC: 138 MMOL/L (ref 135–147)
WBC # BLD AUTO: 5.99 THOUSAND/UL (ref 4.31–10.16)

## 2023-09-14 PROCEDURE — 85025 COMPLETE CBC W/AUTO DIFF WBC: CPT

## 2023-09-14 PROCEDURE — 99239 HOSP IP/OBS DSCHRG MGMT >30: CPT | Performed by: STUDENT IN AN ORGANIZED HEALTH CARE EDUCATION/TRAINING PROGRAM

## 2023-09-14 PROCEDURE — 80048 BASIC METABOLIC PNL TOTAL CA: CPT

## 2023-09-14 PROCEDURE — 92526 ORAL FUNCTION THERAPY: CPT

## 2023-09-14 RX ORDER — METRONIDAZOLE 500 MG/1
500 TABLET ORAL EVERY 8 HOURS SCHEDULED
Status: DISCONTINUED | OUTPATIENT
Start: 2023-09-14 | End: 2023-09-14

## 2023-09-14 RX ORDER — METOPROLOL SUCCINATE 25 MG/1
25 TABLET, EXTENDED RELEASE ORAL
Qty: 30 TABLET | Refills: 0 | Status: SHIPPED | OUTPATIENT
Start: 2023-09-14 | End: 2023-10-14

## 2023-09-14 RX ORDER — AMLODIPINE BESYLATE 2.5 MG/1
5 TABLET ORAL DAILY
Qty: 60 TABLET | Refills: 0 | Status: SHIPPED | OUTPATIENT
Start: 2023-09-14 | End: 2023-10-14

## 2023-09-14 RX ORDER — METRONIDAZOLE 500 MG/1
500 TABLET ORAL EVERY 8 HOURS SCHEDULED
Qty: 18 TABLET | Refills: 0 | Status: SHIPPED | OUTPATIENT
Start: 2023-09-14 | End: 2023-09-20

## 2023-09-14 RX ORDER — METRONIDAZOLE 500 MG/1
500 TABLET ORAL EVERY 8 HOURS SCHEDULED
Status: DISCONTINUED | OUTPATIENT
Start: 2023-09-14 | End: 2023-09-14 | Stop reason: HOSPADM

## 2023-09-14 RX ADMIN — METRONIDAZOLE 500 MG: 500 TABLET ORAL at 13:58

## 2023-09-14 RX ADMIN — LIDOCAINE 5% 1 PATCH: 700 PATCH TOPICAL at 09:09

## 2023-09-14 RX ADMIN — METRONIDAZOLE 500 MG: 500 INJECTION, SOLUTION INTRAVENOUS at 05:32

## 2023-09-14 RX ADMIN — SERTRALINE HYDROCHLORIDE 50 MG: 50 TABLET ORAL at 09:09

## 2023-09-14 RX ADMIN — ENOXAPARIN SODIUM 40 MG: 40 INJECTION SUBCUTANEOUS at 09:09

## 2023-09-14 RX ADMIN — AMLODIPINE BESYLATE 5 MG: 5 TABLET ORAL at 09:09

## 2023-09-14 NOTE — CASE MANAGEMENT
Case Management Discharge Planning Note    Patient name Valencia Leary  Location [address] [facility]-* MRN [id number]  : 1929 Date 2023       Current Admission Date: 9/10/2023  Current Admission Diagnosis:Colitis   Patient Active Problem List    Diagnosis Date Noted   • Colitis 2023   • Hypertension 2023   • Hyperlipidemia 2023   • Depression with anxiety 2023   • CAD (coronary artery disease) 2023   • Abnormal CT scan 2023   • Compression fracture of vertebral column ([address]) 2023      LOS (days): 3  Geometric Mean LOS (GMLOS) (days): 5.00  Days to GMLOS:2     OBJECTIVE:  Risk of Unplanned Readmission Score: 13.29      Current admission status: Inpatient   Preferred Pharmacy:   "[facility]  No address on file      Primary Care Provider: No primary care provider on file. Primary Insurance: [facility] REP  Secondary Insurance:  [id number]     DISCHARGE DETAILS:    Discharge planning discussed with[de-identified] Dtr [address] contacted family/caregiver?: Yes  Were Treatment Team discharge recommendations reviewed with patient/caregiver?: Yes  Did patient/caregiver verbalize understanding of patient care needs?: N/A- going to facility  Were patient/caregiver advised of the risks associated with not following Treatment Team discharge recommendations?: Yes    Contacts  Patient Contacts: Walter Pierson (dtr)  Relationship to Patient[de-identified] Family  Contact Method: Phone  Phone Number: [phone]  Reason/Outcome: Continuity of Care, Emergency Contact, Discharge Planning    Other Referral/Resources/Interventions Provided:  Interventions: SNF, Short Term Rehab, Transportation  Referral Comments: SW notified by Karla Watson of intent to deny STR auth with option for peer to peer review. P2P information forwarded to [facility] Resident Dr. Darlin Escalona.  Per Dr. Darlin Escalona, insurance notes that pt was transitioned to LTC last August and that pt can return back to his LTC unit at [facility] with PT/OT instead of going to STR unit. CAROLA called admissions director Preethi and liaison Ale regarding. Preethi confirmed patient can return under secondary Medicaid plan. Patient will receive PT/OT under Medicare Part B. They will reevaluate patient upon arrival to facility and if they feel he could benefit from more robust therapy program they will resubmit for STR auth. Dr. Iesha Laura and CAROLA contacted dtr Sylwia Castellon to advise of medical clearance and plan to return back to facility today. Dtr gave verbal understanding and agreement to plan. Treatment Team Recommendation: Short Term Rehab, Facility Return  Discharge Destination Plan[de-identified] [address] Return  Transport at Discharge : BLS Ambulance     Number/Name of Dispatcher: [address] and Unit #): [place]  ETA of Transport (Date): 09/14/23  ETA of Transport (Time): [address]  Name, [address] [facility] : [facility]  Receiving Facility/Agency Phone Number: [phone]  Facility/Agency Fax Number: [phone]    VALARIE CASH, unit nurse, [facility] admissions, and dtr aware of pickup time. PMN form in label book.

## 2023-09-14 NOTE — [place] UNIVERSAL TRANSFER FORM
[place] UNIVERSAL TRANSFER FORM  (ALL ITEMS MUST BE COMPLETED)    1. TRANSFER FROM: [address]: [facility]    2. DATE OF TRANSFER: 9/14/2023                        TIME OF TRANSFER: 1430    3. PATIENT NAME: Jaci Vogel,        YOB: 1929                             GENDER: male    4. LANGUAGE:   Chris    5. PHYSICIAN NAME:  Grey Madsen MD                   PHONE: [phone]. CODE STATUS: Level 1 - Full Code        Out of Hospital DNR Attached: No    7. :                                      :  Extended Emergency Contact Information  Primary Emergency Contact: enrique sotomayor  Mobile Phone: [phone]  Relation: Son  Secondary Emergency Contact: [address]  Mobile Phone: [phone]  Relation: Daughter           Health Care Representative/Proxy:  No           Legal Guardian:  No             NAME OF:           HEALTH CARE REPRESENTATIVE/PROXY:                                         OR           LEGAL GUARDIAN, IF NOT :                                               PHONE:  (Day)           (Night)                        (Cell)    8. REASON FOR TRANSFER: (Must include brief medical history and recent changes in physical function or cognition.) Colitis; HTN, hyperlipidemia, depression with anxiety, coronary artery disease; confused, Chris speaking very little English            V/S: BP (!) 174/94   Pulse 71   Temp 98.3 °F (36.8 °C)   Resp 18   Ht 5' 10" (1.778 m)   Wt 78.7 kg (173 lb 8 oz)   SpO2 98%   BMI 24.89 kg/m²           PAIN: None    9. PRIMARY DIAGNOSIS: Colitis      Secondary Diagnosis:         Pacemaker: No      Internal Defib: No          Mental Health Diagnosis (if Applicable):    10. RESTRAINTS: No     11. RESPIRATORY NEEDS: None    12. ISOLATION/PRECAUTION: None    13. ALLERGY: Patient has no known allergies.     14. SENSORY: Vision Good, Hearing Good  and Speech Clear    15. SKIN CONDITION: No Wounds    16. DIET: Regular    17. IV ACCESS: None    18. PERSONAL ITEMS SENT WITH PATIENT: None    19. ATTACHED DOCUMENTS: MUST ATTACH CURRENT MEDICATION INFORMATION Face Sheet and Discharge Summary    20. AT RISK ALERTS:Falls        HARM TO: N/A    21. WEIGHT BEARING STATUS:         Left Leg: Limited        Right Leg: Limited    22. MENTAL STATUS:Alert, Forgetful and [address] speaking, language barrier    21. FUNCTION:        Walk: With Help        Transfer: With Help        Toilet: Not Able        Feed: Self    24. IMMUNIZATIONS/SCREENING:     There is no immunization history on file for this patient. 25. BOWEL: Incontinent  and Date Last BM09/13/23    26. BLADDER: Incontinent and [place] catheter    27.  SENDING FACILITY CONTACT: Lokesh Mendoza                  Title: RN        Unit: [facility] [address]        Phone: [phone] [address] (if known):        Title:        Unit:         Phone:         FORM PREFILLED BY (if applicable)       Title:       Unit:        Phone:         FORM COMPLETED BY Maria D Thibodeaux RN      Title: RN      Phone: [phone]

## 2023-09-14 NOTE — DISCHARGE SUMMARY
Discharge Summary - [facility] Residency     Patient Information: Adilene Andrade 80 y.o. male MRN: [id number]  Unit/Bed#: [address] [place] [address] Encounter: [id number]     Admitting Physician: Mariana Galvin MD  Discharging Physician/Practitioner: Farhana Mccarthy MD   PCP: No primary care provider on file. Admission Date:   Admission Orders (From admission, onward)     Ordered        09/11/23 1439  Inpatient Admission  Once            09/10/23 2133  Place in Observation  Once                      Discharge Date: 9/14/23     Reason for Admission: Colitis [K52.9]  Altered mental status [R41.82]  AMS (altered mental status) [R41.82]     Discharge Diagnoses:      Principal Problem:    Colitis  Active Problems:    Acute metabolic encephalopathy    NSVT (nonsustained ventricular tachycardia) (HCC)    Hypertension    Hyperlipidemia    CAD (coronary artery disease)    Depression with anxiety    Abnormal CT scan    Compression fracture of vertebral column ([address])  Resolved Problems:    Elevated serum creatinine    Sepsis ([address])       Consultations During Hospital Stay:  Cardiology     Procedures Performed:   None     Significant Findings / Test Results:   9/14: K 3.7, Hgb 9.7  9/13: K 3.8, Hgb 10.2,   9/12: WBC 9.86, Hgb 9.8, , Tnl 23  9/11: WBC 16.6 - 11.88, Hgb 12.9 - 10.1, , Cr 1.31- 1.22  UA neg leukocyte/nitrites  Urine micro:granular cast  Normal C diff, normal stool enteric panel, normal UDS    CTAP: colitis of rectosigmoid colon; persistent small opacity in LLL  CT head: no acute intracranial abnormality  CXR:  Persistent small opacity in the left lower lobe similar to prior examination may reflect atelectasis and/or scarring. Cholelithiasis without evidence of cholecystitis. Mild diffuse thickening of the rectosigmoid colon which may be due to nonspecific colitis.  Left lateral hernia  Echo: EF 55%, otherwise WNL  Blood cultures: No growth at 72 hours     Incidental Findings:   None     Test Results Pending at Discharge (will require follow up): None     Outpatient Tests Requested:  N/A     Outpatient follow-up Requested:  Primary care physician  Cardiologist    Complications:   None    Things to address at first visit after hospitalization   Has patient had any other episodes of unresponsiveness? Has patient been compliant with his blood pressure medication regimen? Patient followed up with cardiologist?  Is patient pleated his course of antibiotics? Is patient having any increased abdominal pain or diarrhea? Hospital Course:      Yordan Marroquin is a 80 y.o. male with PMH of HTN, CAD, HLD, depression, cognitive impairment/dementia who originally presented to the hospital from [facility] on 9/10/2023 due to an episode of unresponsiveness. Per [facility] home, patient was found to be tachycardic, sBP 89, RR 10, and afebrile during the episode. Patient also found to have loose stool both at the nursing home and in the ED. Patient was admitted for sepsis likely 2/2 colitis as evidenced by tachycardia, leukocytosis, and CTAP finding. Respiratory viral panel, blood cultures, stool cultures and C. difficile PCR were negative. Patient was started on Rocephin and Flagyl for colitis. NSVT noted on telemetry, and he was started on metoprolol by cardiology team.  Patient underwent Lexiscan stress test, which was negative for ischemia. Patient was discharged back to [facility] acute rehab on Flagyl monotherapy for total of 10 days (6 additional). * Colitis  On admission patient met sepsis criteria likely due to colitis however throughout admission patient's vital signs normalized. Patient has also been able to able to tolerate oral diet and has been asymptomatic. Stool cultures as well as C. difficile were negative. Blood cultures continue to be negative at 72 hours.   On discharge patient will continue monotherapy of Flagyl for 6 additional days.  Roseanne to follow-up with primary    NSVT (nonsustained ventricular tachycardia) ([address])  During admission patient while on telemetry was found to have a 14 beat run of nonsustained ventricular tachycardia. Patient does have a previous cardiac history such as coronary artery disease and questionable murmur. Cardiology was consulted and Lexiscan stress test was performed. Patient was found to have a negative stress test.  Due to the NSVT patient's home medication regimen was changed from Norvasc 2.5 to 5 mg daily as well as metoprolol 25 mg. Recommended to stop the losartan on discharge. Acute metabolic encephalopathy  Prior to admission patient had an episode of witnessed unresponsiveness at the nursing home. Patient was found slouching in his wheelchair and vomited small digested food. On admission patient's mentation improved with no chief complaint. Patient was noted to have a loose BM in the ED. CTAP showed mild diffuse thickening of rectosigmoid colon concerning for colitis. CT head demonstrated no acute intracranial abnormalities. Patient's stool studies such as C. difficile were negative. Encephalopathy was suspected in setting of colitis versus orthostasis/vasovagal.  On admission patient home medications of oxybutynin and prazosin were held. Consider restarting on discharge. Hypertension  Prior to admission, as per [facility], home meds include Losartan 100 mg daily and Norvasc 2.5 mg daily. Per daughter, patient losartan 75 mg daily. During hospital stay patient's Norvasc was increased to 5 mg daily and metoprolol 25 was added. Losartan was discontinued in setting of sepsis and elevated creatinine however blood pressures have been within normal limits so can discontinue. CAD (coronary artery disease)  Prior to admission patient has a history of CAD. Patient was continued on home Lipitor, can continue on discharge.      Hyperlipidemia  Prior to admission patient has a long history of hyperlipidemia. On discharge patient will continue home Lipitor. Depression with anxiety  Prior to admission patient has a chronic/stable history of depression with anxiety which he is on a home medication regimen of Zoloft 50 mg. During hospital stay patient was continued and on discharge patient will be continued on home regimen. Sepsis (HCC)-resolved as of 9/13/2023  Resolved . Present on admission as evidenced by tachycardia and leukocytosis. Patient having diarrheal illness which is CTA abdomen pelvis showed mild diffuse thickening of rectal sigmoid colon likely colitis. Patient started on ceftriaxone and Flagyl IV. Patient received 1 L bolus of normal saline in the ED. UA was negative. Stool cultures and C. difficile negative. Chest x-ray observed minimal bibasilar atelectasis. Blood cultures have been negative at 72 hours. On discharge patient will complete monotherapy with Flagyl. Elevated serum creatinine-resolved as of 9/13/2023  Present on arrival patient's creatinine found to be elevated at 1.31. Chart review patient's baseline calculated to be around 0.97-1.07. Of note patient's urine microscope he showed granular casts. Patient is status post IV bolus and drip. Patient currently able to maintain diet. Resolved throughout hospital stay    Compression fracture of vertebral column (HCC)  Recent CT showed age-indeterminate compression deformities of T11 and L1 1 vertebrae. Likely to be chronic. Patient denies of any tenderness. Patient getting discharged to short-term rehab for physical therapy. Patient can continue on Tylenol and lidocaine patches as needed. Abnormal CT scan  CT abdomen and pelvis on admission showed several acute and chronic pathologies as listed below. All of pathologies were stable. Patient to follow-up with primary care on follow-up    1.  Persistent small opacity in the left lower lobe similar to prior examination may reflect atelectasis and/or scarring. 2. Cholelithiasis without evidence of cholecystitis. 3. Subacute to chronic fractures of the right lateral eighth through 10th ribs seen in retrospect on prior exam. Stable chronic T11 and L1 compression deformities. 4. There is a left lateral hernia containing a portion of a loop of descending colon in the ninth and 10th left intercostal space (series 2, image 81). 5. One or more simple renal cyst(s) is noted. In addition, there are hypodensities too small to characterize. No hydronephrosis        Condition at Discharge: good      Discharge Day Visit / Exam:      Vitals: Blood Pressure: 122/54 (09/13/23 2207)  Pulse: 70 (09/13/23 2207)  Temperature: 98.4 °F (36.9 °C) (09/13/23 2207)  Temp Source: Oral (09/13/23 0326)  Respirations: 18 (09/13/23 2207)  Height: 5' 10" (177.8 cm) (09/11/23 1143)  Weight - Scale: 78.7 kg (173 lb 8 oz) (09/14/23 0547)  SpO2: 96 % (09/13/23 2207)  Exam:   Physical Exam  Vitals reviewed. Constitutional:       General: He is not in acute distress. Appearance: Normal appearance. HENT:      Head: Normocephalic and atraumatic. Nose: Nose normal.   Eyes:      General: No scleral icterus. Conjunctiva/sclera: Conjunctivae normal.   Cardiovascular:      Rate and Rhythm: Normal rate and regular rhythm. Pulses: Normal pulses. Heart sounds: Normal heart sounds. No murmur heard. Pulmonary:      Effort: Pulmonary effort is normal. No respiratory distress. Breath sounds: Normal breath sounds. No wheezing. Abdominal:      General: Bowel sounds are normal. There is no distension. Palpations: Abdomen is soft. Tenderness: There is no abdominal tenderness. There is no right CVA tenderness, left CVA tenderness, guarding or rebound. Musculoskeletal:      Right lower leg: No edema. Left lower leg: No edema. Skin:     General: Skin is warm and dry. Capillary Refill: Capillary refill takes less than 2 seconds. Neurological:      Mental Status: He is alert and oriented to person, place, and time. Mental status is at baseline. GCS: GCS eye subscore is 4. GCS verbal subscore is 5. GCS motor subscore is 6. Psychiatric:         Mood and Affect: Mood normal.         Behavior: Behavior normal.            Discussion with Family:   Patient Information Sharing: With the consent of Izzy Mc, their loved ones, Rigo Bennett, were notified today by inpatient team of the patient’s condition and current plan. All questions answered. Discharge instructions/Information to patient and family:   See after visit summary for information provided to patient and family. Discharge Medications:     Medication List      ASK your doctor about these medications    • acetaminophen 325 mg tablet; Commonly known as: TYLENOL; Take 2 tablets   (650 mg total) by mouth every 6 (six) hours as needed for mild pain  • amLODIPine 2.5 mg tablet; Commonly known as: Maddarlyna Michael; Take 1 tablet (2.5   mg total) by mouth daily Do not start before August 1, 2023. • atorvastatin 10 mg tablet; Commonly known as: LIPITOR  • docusate sodium 100 mg capsule; Commonly known as: COLACE; Take 1   capsule (100 mg total) by mouth 2 (two) times a day  • lidocaine 5 %; Commonly known as: LIDODERM  • losartan 100 MG tablet; Commonly known as: COZAAR; Take 1 tablet (100 mg   total) by mouth daily Do not start before August 1, 2023. • magnesium hydroxide 400 mg/5 mL oral suspension; Commonly known as: MILK   OF MAGNESIA  • melatonin 3 mg; Take 1 tablet (3 mg total) by mouth daily at bedtime  • oxybutynin 5 mg tablet; Commonly known as: DITROPAN  • polyethylene glycol 17 g packet; Commonly known as: MIRALAX  • prazosin 1 mg capsule; Commonly known as: MINIPRESS  • sertraline 50 mg tablet; Commonly known as: ZOLOFT        Disposition:   Acute Rehab at [address] [place] [address]     Discharge Statement:  I spent 45 minutes discharging the patient.  This time was spent on the day of discharge. I had direct contact with the patient on the day of discharge. Greater than 50% of the total time was spent examining patient, answering all patient questions, arranging and discussing plan of care with patient as well as directly providing post-discharge instructions. Additional time then spent on discharge activities.      ** Please Note: This note has been constructed using a voice recognition system **     Chrissy Childress MD  09/14/23  7:01 AM

## 2023-09-14 NOTE — CASE MANAGEMENT
Support Center has received intent to deny.    Insurance: TOMMY  Denial received for: SNF  Facility: [facility] [place]   Denial #: [id number]  Denial Reason: does not meet CMS guidelines  Peer to Peer phone#: [phone] opt 5     Deadline: 9/14 @ 4  Care Manager notified: Cleve Landaverde

## 2023-09-14 NOTE — DISCHARGE INSTR - AVS FIRST PAGE
Please complete course of antibiotics: Flagyl for 6 days until 9/20    Please follow-up with primary care physician    If symptoms increase or any other episodes of altered mental status please go to the emergency room. Your medications have been changed to have metoprolol instead of losartan and your amlodipine has increased from 2.5 to 5 mg.

## 2023-09-14 NOTE — SPEECH THERAPY NOTE
SLP Progress Note    Patient Name: Whitney Schwartz  LYHVU'Y Date: 9/14/2023     Subjective:  "Coffee with sugar"    Objective:  Pt was seen for f/u to swallowing evaluation to ensure tolerance of current diet over time (regular textured food). Pt was alert and positioned upright. Pt was assessed with peaches, tracy crackers, coffee and juice (successive sips of juice). Upper dentures were in place. Mastication was timely and effective. Bolus formation and transfer were effective. Swallow initiation appeared prompt. Laryngeal rise was good by palpation. No coughing, throat clearing, c/o stasis, multiple swallows, change in vocal quality noted c po intake today. Assessment:  Pt tolerated food and liquid c no overt s/s oropharyngeal dysphagia or aspiration. Plan/Recommendations:  Continue diet, continue to allow pt to feed himself.     Jermain Antoine [address] [facility] [phone]

## 2023-09-14 NOTE — CASE MANAGEMENT
Called Angeles Gonzalez ([phone]) to check status of authorization. Spoke to [place] who stated Katherine Blowers still pending under review at this time. ELFEGO notified.

## 2023-09-14 NOTE — PLAN OF CARE
Problem: Potential for Falls  Goal: Patient will remain free of falls  Description: INTERVENTIONS:  - Educate patient/family on patient safety including physical limitations  - Instruct patient to call for assistance with activity   - Consult OT/PT to assist with strengthening/mobility   - Keep Call bell within reach  - Keep bed low and locked with side rails adjusted as appropriate  - Keep care items and personal belongings within reach  - Initiate and maintain comfort rounds  - Make Fall Risk Sign visible to staff  - Offer Toileting every 2 Hours, in advance of need  - Initiate/Maintain bed alarm  - Obtain necessary fall risk management equipment: yellow socks  - Apply yellow socks and bracelet for high fall risk patients  - Consider moving patient to room near nurses station  Outcome: Progressing     Problem: Prexisting or High Potential for Compromised Skin Integrity  Goal: Skin integrity is maintained or improved  Description: INTERVENTIONS:  - Identify patients at risk for skin breakdown  - Assess and monitor skin integrity  - Assess and monitor nutrition and hydration status  - Monitor labs   - Assess for incontinence   - Turn and reposition patient  - Assist with mobility/ambulation  - Relieve pressure over bony prominences  - Avoid friction and shearing  - Provide appropriate hygiene as needed including keeping skin clean and dry  - Evaluate need for skin moisturizer/barrier cream  - Collaborate with interdisciplinary team   - Patient/family teaching  - Consider wound care consult   Outcome: Progressing     Problem: MOBILITY - ADULT  Goal: Maintain or return to baseline ADL function  Description: INTERVENTIONS:  -  Assess patient's ability to carry out ADLs; assess patient's baseline for ADL function and identify physical deficits which impact ability to perform ADLs (bathing, care of mouth/teeth, toileting, grooming, dressing, etc.)  - Assess/evaluate cause of self-care deficits   - Assess range of motion  - Assess patient's mobility; develop plan if impaired  - Assess patient's need for assistive devices and provide as appropriate  - Encourage maximum independence but intervene and supervise when necessary  - Involve family in performance of ADLs  - Assess for home care needs following discharge   - Consider OT consult to assist with ADL evaluation and planning for discharge  - Provide patient education as appropriate  Outcome: Progressing  Goal: Maintains/Returns to pre admission functional level  Description: INTERVENTIONS:  - Perform BMAT or MOVE assessment daily.   - Set and communicate daily mobility goal to care team and patient/family/caregiver. - Collaborate with rehabilitation services on mobility goals if consulted  - Perform Range of Motion 2 times a day. - Reposition patient every 2 hours.   - Dangle patient 2 times a day  - Stand patient 2 times a day  - Ambulate patient 2 times a day  - Out of bed to chair 2 times a day   - Out of bed for meals 2 times a day  - Out of bed for toileting  - Record patient progress and toleration of activity level   Outcome: Progressing     Problem: CARDIOVASCULAR - ADULT  Goal: Maintains optimal cardiac output and hemodynamic stability  Description: INTERVENTIONS:  - Monitor I/O, vital signs and rhythm  - Monitor for S/S and trends of decreased cardiac output  - Administer and titrate ordered vasoactive medications to optimize hemodynamic stability  - Assess quality of pulses, skin color and temperature  - Assess for signs of decreased coronary artery perfusion  - Instruct patient to report change in severity of symptoms  Outcome: Progressing  Goal: Absence of cardiac dysrhythmias or at baseline rhythm  Description: INTERVENTIONS:  - Continuous cardiac monitoring, vital signs, obtain 12 lead EKG if ordered  - Administer antiarrhythmic and heart rate control medications as ordered  - Monitor electrolytes and administer replacement therapy as ordered  Outcome: Progressing     Problem: INFECTION - ADULT  Goal: Absence or prevention of progression during hospitalization  Description: INTERVENTIONS:  - Assess and monitor for signs and symptoms of infection  - Monitor lab/diagnostic results  - Monitor all insertion sites, i.e. indwelling lines, tubes, and drains  - Monitor endotracheal if appropriate and nasal secretions for changes in amount and color  - [place] appropriate cooling/warming therapies per order  - Administer medications as ordered  - Instruct and encourage patient and family to use good hand hygiene technique  - Identify and instruct in appropriate isolation precautions for identified infection/condition  Outcome: Progressing  Goal: Absence of fever/infection during neutropenic period  Description: INTERVENTIONS:  - Monitor WBC    Outcome: Progressing     Problem: SAFETY ADULT  Goal: Patient will remain free of falls  Description: INTERVENTIONS:  - Educate patient/family on patient safety including physical limitations  - Instruct patient to call for assistance with activity   - Consult OT/PT to assist with strengthening/mobility   - Keep Call bell within reach  - Keep bed low and locked with side rails adjusted as appropriate  - Keep care items and personal belongings within reach  - Initiate and maintain comfort rounds  - Make Fall Risk Sign visible to staff  - Offer Toileting every 2 Hours, in advance of need  - Initiate/Maintain bed alarm  - Obtain necessary fall risk management equipment: yellow socks  - Apply yellow socks and bracelet for high fall risk patients  - Consider moving patient to room near nurses station  Outcome: Progressing  Goal: Maintain or return to baseline ADL function  Description: INTERVENTIONS:  -  Assess patient's ability to carry out ADLs; assess patient's baseline for ADL function and identify physical deficits which impact ability to perform ADLs (bathing, care of mouth/teeth, toileting, grooming, dressing, etc.)  - Assess/evaluate cause of self-care deficits   - Assess range of motion  - Assess patient's mobility; develop plan if impaired  - Assess patient's need for assistive devices and provide as appropriate  - Encourage maximum independence but intervene and supervise when necessary  - Involve family in performance of ADLs  - Assess for home care needs following discharge   - Consider OT consult to assist with ADL evaluation and planning for discharge  - Provide patient education as appropriate  Outcome: Progressing  Goal: Maintains/Returns to pre admission functional level  Description: INTERVENTIONS:  - Perform BMAT or MOVE assessment daily.   - Set and communicate daily mobility goal to care team and patient/family/caregiver. - Collaborate with rehabilitation services on mobility goals if consulted  - Perform Range of Motion 2 times a day. - Reposition patient every 2 hours.   - Dangle patient 2 times a day  - Stand patient 2 times a day  - Ambulate patient 2 times a day  - Out of bed to chair 2 times a day   - Out of bed for meals 2 times a day  - Out of bed for toileting  - Record patient progress and toleration of activity level   Outcome: Progressing     Problem: DISCHARGE PLANNING  Goal: Discharge to home or other facility with appropriate resources  Description: INTERVENTIONS:  - Identify barriers to discharge w/patient and caregiver  - Arrange for needed discharge resources and transportation as appropriate  - Identify discharge learning needs (meds, wound care, etc.)  - Arrange for interpretive services to assist at discharge as needed  - Refer to Case Management Department for coordinating discharge planning if the patient needs post-hospital services based on physician/advanced practitioner order or complex needs related to functional status, cognitive ability, or social support system  Outcome: Progressing     Problem: Knowledge Deficit  Goal: Patient/family/caregiver demonstrates understanding of disease process, treatment plan, medications, and discharge instructions  Description: Complete learning assessment and assess knowledge base. Interventions:  - Provide teaching at level of understanding  - Provide teaching via preferred learning methods  Outcome: Progressing     Problem: Nutrition/Hydration-ADULT  Goal: Nutrient/Hydration intake appropriate for improving, restoring or maintaining nutritional needs  Description: Monitor and assess patient's nutrition/hydration status for malnutrition. Collaborate with interdisciplinary team and initiate plan and interventions as ordered. Monitor patient's weight and dietary intake as ordered or per policy. Utilize nutrition screening tool and intervene as necessary. Determine patient's food preferences and provide high-protein, high-caloric foods as appropriate.      INTERVENTIONS:  - Monitor oral intake, urinary output, labs, and treatment plans  - Assess nutrition and hydration status and recommend course of action  - Evaluate amount of meals eaten  - Assist patient with eating if necessary   - Allow adequate time for meals  - Recommend/ encourage appropriate diets, oral nutritional supplements, and vitamin/mineral supplements  - Order, calculate, and assess calorie counts as needed  - Recommend, monitor, and adjust tube feedings and TPN/PPN based on assessed needs  - Assess need for intravenous fluids  - Provide specific nutrition/hydration education as appropriate  - Include patient/family/caregiver in decisions related to nutrition  Outcome: Progressing

## 2023-09-14 NOTE — CASE MANAGEMENT
Support Center has received denial.  Insurance: TOMMY  Denial received for: SNF  Facility: [facility]   Denial #: [id number]  Denial Reason: does not meet CMS guidelines  Fast Appeal P#:  [phone]  Care Manager notified: Shital Wiggins

## 2023-09-15 LAB
ATRIAL RATE: 77 BPM
ATRIAL RATE: 81 BPM
P AXIS: 63 DEGREES
PR INTERVAL: 308 MS
QRS AXIS: -33 DEGREES
QRS AXIS: -39 DEGREES
QRSD INTERVAL: 108 MS
QRSD INTERVAL: 92 MS
QT INTERVAL: 376 MS
QT INTERVAL: 396 MS
QTC INTERVAL: 459 MS
QTC INTERVAL: 460 MS
T WAVE AXIS: -1 DEGREES
T WAVE AXIS: 14 DEGREES
VENTRICULAR RATE: 81 BPM
VENTRICULAR RATE: 90 BPM

## 2023-09-15 PROCEDURE — 93010 ELECTROCARDIOGRAM REPORT: CPT | Performed by: INTERNAL MEDICINE

## 2023-09-16 LAB
BACTERIA BLD CULT: NORMAL
BACTERIA BLD CULT: NORMAL

## 2023-09-21 NOTE — UTILIZATION REVIEW
NOTIFICATION OF ADMISSION DISCHARGE   This is a Notification of Discharge from [facility]. Please be advised that this patient has been discharge from our facility. Below you will find the admission and discharge date and time including the patient’s disposition. UTILIZATION REVIEW CONTACT:  Jorden Moreno  Utilization   Network Utilization Review Department  Phone: [phone] x carefully listen to the prompts. All voicemails are confidential.  Email: [email or web address]     ADMISSION INFORMATION  PRESENTATION DATE: 9/10/2023  4:43 PM  OBERVATION ADMISSION DATE:   INPATIENT ADMISSION DATE: 9/11/23  2:39 PM   DISCHARGE DATE: 9/14/2023  2:48 PM   DISPOSITION:Discharged/Transferred to Long Term Care/Personal Care Home/Assisted Living    IMPORTANT INFORMATION:  Send all requests for admission clinical reviews, approved or denied determinations and any other requests to dedicated fax number below belonging to the campus where the patient is receiving treatment.  List of dedicated fax numbers:  [place] DENIALS (Administrative/Medical Necessity) [phone] [facility] [phone]   [facility] [phone]   [facility] [phone] 16[address] [facility] [phone]   [address] [place] [address] [phone]   [facility] [phone]   [address] [facility] [address] [facility] [phone]   [address] [facility] [phone] 3441 [facility] [phone]   [address] [facility] 3000 [facility] [phone]

## 2023-11-10 ENCOUNTER — APPOINTMENT (EMERGENCY)
Dept: RADIOLOGY | Facility: HOSPITAL | Age: 88
End: 2023-11-10
Payer: COMMERCIAL

## 2023-11-10 ENCOUNTER — HOSPITAL ENCOUNTER (EMERGENCY)
Facility: HOSPITAL | Age: 88
Discharge: NURSING, REHAB OR LONG-TERM CARE | End: 2023-11-11
Attending: EMERGENCY MEDICINE
Payer: COMMERCIAL

## 2023-11-10 DIAGNOSIS — R33.8 ACUTE URINARY RETENTION: ICD-10-CM

## 2023-11-10 DIAGNOSIS — U07.1 COVID-19: ICD-10-CM

## 2023-11-10 DIAGNOSIS — N39.0 UTI (URINARY TRACT INFECTION): Primary | ICD-10-CM

## 2023-11-10 LAB
ALBUMIN SERPL BCP-MCNC: 3.7 G/DL (ref 3.5–5)
ALP SERPL-CCNC: 66 U/L (ref 34–104)
ALT SERPL W P-5'-P-CCNC: 8 U/L (ref 7–52)
ANION GAP SERPL CALCULATED.3IONS-SCNC: 8 MMOL/L
ANISOCYTOSIS BLD QL SMEAR: PRESENT
AST SERPL W P-5'-P-CCNC: 13 U/L (ref 13–39)
BASOPHILS # BLD MANUAL: 0 THOUSAND/UL (ref 0–0.1)
BASOPHILS NFR MAR MANUAL: 0 % (ref 0–1)
BILIRUB SERPL-MCNC: 1.27 MG/DL (ref 0.2–1)
BUN SERPL-MCNC: 24 MG/DL (ref 5–25)
CALCIUM SERPL-MCNC: 8.9 MG/DL (ref 8.4–10.2)
CARDIAC TROPONIN I PNL SERPL HS: 12 NG/L
CHLORIDE SERPL-SCNC: 101 MMOL/L (ref 96–108)
CO2 SERPL-SCNC: 26 MMOL/L (ref 21–32)
CREAT SERPL-MCNC: 1.12 MG/DL (ref 0.6–1.3)
EOSINOPHIL # BLD MANUAL: 0 THOUSAND/UL (ref 0–0.4)
EOSINOPHIL NFR BLD MANUAL: 0 % (ref 0–6)
ERYTHROCYTE [DISTWIDTH] IN BLOOD BY AUTOMATED COUNT: 13.9 % (ref 11.6–15.1)
GFR SERPL CREATININE-BSD FRML MDRD: 55 ML/MIN/1.73SQ M
GLUCOSE SERPL-MCNC: 165 MG/DL (ref 65–140)
HCT VFR BLD AUTO: 37.6 % (ref 36.5–49.3)
HGB BLD-MCNC: 12.5 G/DL (ref 12–17)
LG PLATELETS BLD QL SMEAR: PRESENT
LYMPHOCYTES # BLD AUTO: 14 % (ref 14–44)
LYMPHOCYTES # BLD AUTO: 4.12 THOUSAND/UL (ref 0.6–4.47)
MCH RBC QN AUTO: 32.6 PG (ref 26.8–34.3)
MCHC RBC AUTO-ENTMCNC: 33.2 G/DL (ref 31.4–37.4)
MCV RBC AUTO: 98 FL (ref 82–98)
MONOCYTES # BLD AUTO: 1.44 THOUSAND/UL (ref 0–1.22)
MONOCYTES NFR BLD: 7 % (ref 4–12)
NEUTROPHILS # BLD MANUAL: 15.02 THOUSAND/UL (ref 1.85–7.62)
NEUTS SEG NFR BLD AUTO: 73 % (ref 43–75)
PLATELET # BLD AUTO: 231 THOUSANDS/UL (ref 149–390)
PLATELET BLD QL SMEAR: ADEQUATE
PMV BLD AUTO: 12.7 FL (ref 8.9–12.7)
POTASSIUM SERPL-SCNC: 4.3 MMOL/L (ref 3.5–5.3)
PROT SERPL-MCNC: 7.3 G/DL (ref 6.4–8.4)
RBC # BLD AUTO: 3.84 MILLION/UL (ref 3.88–5.62)
RBC MORPH BLD: PRESENT
SODIUM SERPL-SCNC: 135 MMOL/L (ref 135–147)
STOMATOCYTES BLD QL SMEAR: PRESENT
VARIANT LYMPHS # BLD AUTO: 6 %
WBC # BLD AUTO: 20.58 THOUSAND/UL (ref 4.31–10.16)

## 2023-11-10 PROCEDURE — 70450 CT HEAD/BRAIN W/O DYE: CPT

## 2023-11-10 PROCEDURE — 71045 X-RAY EXAM CHEST 1 VIEW: CPT

## 2023-11-10 PROCEDURE — 84484 ASSAY OF TROPONIN QUANT: CPT | Performed by: EMERGENCY MEDICINE

## 2023-11-10 PROCEDURE — 93005 ELECTROCARDIOGRAM TRACING: CPT

## 2023-11-10 PROCEDURE — 84145 PROCALCITONIN (PCT): CPT | Performed by: EMERGENCY MEDICINE

## 2023-11-10 PROCEDURE — 85027 COMPLETE CBC AUTOMATED: CPT | Performed by: EMERGENCY MEDICINE

## 2023-11-10 PROCEDURE — 71260 CT THORAX DX C+: CPT

## 2023-11-10 PROCEDURE — 74177 CT ABD & PELVIS W/CONTRAST: CPT

## 2023-11-10 PROCEDURE — 80053 COMPREHEN METABOLIC PANEL: CPT | Performed by: EMERGENCY MEDICINE

## 2023-11-10 PROCEDURE — 36415 COLL VENOUS BLD VENIPUNCTURE: CPT | Performed by: EMERGENCY MEDICINE

## 2023-11-10 PROCEDURE — 99285 EMERGENCY DEPT VISIT HI MDM: CPT

## 2023-11-10 PROCEDURE — 85007 BL SMEAR W/DIFF WBC COUNT: CPT | Performed by: EMERGENCY MEDICINE

## 2023-11-10 RX ADMIN — IOHEXOL 100 ML: 350 INJECTION, SOLUTION INTRAVENOUS at 23:15

## 2023-11-10 NOTE — Clinical Note
Case was discussed with LEA Naqvi and the patient's admission status was agreed to be Admission Status: observation status to the service of Dr. Veronica Ibrahim.

## 2023-11-11 VITALS
HEART RATE: 86 BPM | BODY MASS INDEX: 24.77 KG/M2 | TEMPERATURE: 98 F | SYSTOLIC BLOOD PRESSURE: 157 MMHG | WEIGHT: 173 LBS | RESPIRATION RATE: 18 BRPM | OXYGEN SATURATION: 94 % | HEIGHT: 70 IN | DIASTOLIC BLOOD PRESSURE: 68 MMHG

## 2023-11-11 LAB
2HR DELTA HS TROPONIN: -1 NG/L
BACTERIA UR QL AUTO: ABNORMAL /HPF
BILIRUB UR QL STRIP: NEGATIVE
CARDIAC TROPONIN I PNL SERPL HS: 11 NG/L
CLARITY UR: CLEAR
COLOR UR: YELLOW
FLUAV RNA RESP QL NAA+PROBE: NEGATIVE
FLUBV RNA RESP QL NAA+PROBE: NEGATIVE
GLUCOSE UR STRIP-MCNC: NEGATIVE MG/DL
HGB UR QL STRIP.AUTO: NEGATIVE
HYALINE CASTS #/AREA URNS LPF: ABNORMAL /LPF
KETONES UR STRIP-MCNC: NEGATIVE MG/DL
LEUKOCYTE ESTERASE UR QL STRIP: NEGATIVE
MUCOUS THREADS UR QL AUTO: ABNORMAL
NITRITE UR QL STRIP: POSITIVE
NON-SQ EPI CELLS URNS QL MICRO: ABNORMAL /HPF
PH UR STRIP.AUTO: 6.5 [PH]
PROCALCITONIN SERPL-MCNC: 0.08 NG/ML
PROT UR STRIP-MCNC: NEGATIVE MG/DL
RBC #/AREA URNS AUTO: ABNORMAL /HPF
RSV RNA RESP QL NAA+PROBE: NEGATIVE
SARS-COV-2 RNA RESP QL NAA+PROBE: POSITIVE
SP GR UR STRIP.AUTO: <=1.005 (ref 1–1.03)
UROBILINOGEN UR QL STRIP.AUTO: 1 E.U./DL
WBC #/AREA URNS AUTO: ABNORMAL /HPF

## 2023-11-11 PROCEDURE — 87077 CULTURE AEROBIC IDENTIFY: CPT | Performed by: EMERGENCY MEDICINE

## 2023-11-11 PROCEDURE — 0241U HB NFCT DS VIR RESP RNA 4 TRGT: CPT | Performed by: EMERGENCY MEDICINE

## 2023-11-11 PROCEDURE — 81001 URINALYSIS AUTO W/SCOPE: CPT | Performed by: EMERGENCY MEDICINE

## 2023-11-11 PROCEDURE — 87186 SC STD MICRODIL/AGAR DIL: CPT | Performed by: EMERGENCY MEDICINE

## 2023-11-11 PROCEDURE — 96365 THER/PROPH/DIAG IV INF INIT: CPT

## 2023-11-11 PROCEDURE — 87086 URINE CULTURE/COLONY COUNT: CPT | Performed by: EMERGENCY MEDICINE

## 2023-11-11 PROCEDURE — NC001 PR NO CHARGE: Performed by: EMERGENCY MEDICINE

## 2023-11-11 PROCEDURE — 36415 COLL VENOUS BLD VENIPUNCTURE: CPT | Performed by: EMERGENCY MEDICINE

## 2023-11-11 PROCEDURE — 96361 HYDRATE IV INFUSION ADD-ON: CPT

## 2023-11-11 PROCEDURE — 84484 ASSAY OF TROPONIN QUANT: CPT | Performed by: EMERGENCY MEDICINE

## 2023-11-11 RX ORDER — CEPHALEXIN 500 MG/1
500 CAPSULE ORAL EVERY 8 HOURS SCHEDULED
Qty: 21 CAPSULE | Refills: 0 | Status: SHIPPED | OUTPATIENT
Start: 2023-11-11 | End: 2023-11-18

## 2023-11-11 RX ORDER — CEFTRIAXONE 2 G/50ML
2000 INJECTION, SOLUTION INTRAVENOUS ONCE
Status: COMPLETED | OUTPATIENT
Start: 2023-11-11 | End: 2023-11-11

## 2023-11-11 RX ADMIN — CEFTRIAXONE 2000 MG: 2 INJECTION, SOLUTION INTRAVENOUS at 02:13

## 2023-11-11 RX ADMIN — SODIUM CHLORIDE 1000 ML: 0.9 INJECTION, SOLUTION INTRAVENOUS at 01:10

## 2023-11-11 RX ADMIN — SODIUM CHLORIDE 1000 ML: 0.9 INJECTION, SOLUTION INTRAVENOUS at 00:32

## 2023-11-11 NOTE — ED PROVIDER NOTES
Final Diagnosis:  1. Altered mental status        Chief Complaint   Patient presents with    Altered Mental Status     Patient arrives via EMS for potential AMS. Pt comes from [facility] [place]. Per EMS, care center states pt is not altered and at his baseline but daughter states she felt he is altered. Pt in no distress, offers no c/o at time of triage       HPI  ***    EMS reports if applicable: N/A ***    - Previous charting underwent limited review with attention to last ED visits, labs, ekgs, and prior imaging. Chart review reveals : ***    No results displayed because visit has over 200 results.          - No*** language barrier.   - History obtained from patient ***.   - There are no*** limitations to the history obtained. - Discuss patient's care, with patient permission or by chart review, with ***     PMH:   has a past medical history of Anxiety, Atherosclerotic coronary vascular disease, Depressive disorder, Hyperlipidemia, Hypertension, and Sepsis ([address]) (9/11/2023). PSH:   has no past surgical history on file. Social History:  Tobacco Use: Low Risk  (11/10/2023)    Patient History     Smoking Tobacco Use: Never     Smokeless Tobacco Use: Never     Passive Exposure: Not on file     Alcohol Use: Not on file     No illicit use ***      ROS:  Pertinent positives/negatives: ***. Some ROS may be present in the HPI and would take precedent over these standard questions asked below. Review of Systems     CONSTITUTIONAL:  No lethargy. No weakness. No unexpected weight loss. No appetite change. EYES:  No pain, redness, or discharge. No loss of vision. No orbital trauma or pain. ENT:  No tinnitus or decreased hearing. No epistaxis/purulent rhinorrhea. No voice change, airway closing, trismus. CARDIOVASCULAR:  No chest pain. No skin mottling or pallor. No change in exertional capacity  RESPIRATORY:  No hemoptysis. No paroxysmal nocturnal dyspnea. No stridor. No audible wheezing.  No production with cough. GASTROINTESTINAL:  Normal appetite. No vomiting, diarrhea. No pain. No bloating. No melena. No hematochezia. GENITOURINARY:  No frequency, urgency, nocturia. No hematuria or dysuria. No discharge. No sores/adenopathy. MUSCULOSKELETAL:  No arthralgias or myalgias that are new. No new deformity. INTEGUMENTARY:  No swelling. No unexpected contusions. No abrasions. No lymphangitis. NEUROLOGIC:  No meningismus. No new numbness of the extremities. No new focal weakness. No postural instability  PSYCHIATRIC:  No SI HI AVH  HEMATOLOGICAL:  No bleeding. No petechiae. No bruising. ALLERGIES:  No urticaria. No sudden abd cramping. No stridor. PE:     Physical exam highlights: ***  Physical Exam       Vitals:    11/10/23 2123 11/10/23 2315 11/11/23 0009   BP: 137/68 163/73 161/74   BP Location:  Right arm    Pulse: 86 87 87   Resp: 18 18 18   Temp: 98 °F (36.7 °C)     TempSrc: Tympanic     SpO2: 95% 93% 93%   Weight: 78.5 kg (173 lb)     Height: 5' 10" (1.778 m)       Vitals reviewed by me. Nursing note reviewed  Chaperone present for all sensitive exam.  Const: No acute distress. Alert. Nontoxic. Not diaphoretic. HEENT: External ears normal. No protrusion drainage swelling. Nose normal. No drainage/traumatic deformity. MMM. Mouth with baseline/symmetric movement. No trismus. Eyes: No squinting. No icterus. No tearing/swelling/drainage. Tracks through the room with normal EOM. Neck: ROM normal. No rigidity. No meningismus. Cards: Rate as per vitals Compared to monitor sinus unless documented. Regular Well perfused. Pulm: able to verbalize without additional effort. Effort and excursion normal. No distress. No audible wheezing/ stridor. Normal resp rate without retraction or change in work of breathing. Abd: No distension beyond baseline. No fluctuant wave. Patient without peritoneal pain with shifting/bumping the bed. MSK: ROM normal baseline. No deformity.  No contractures from baseline. Skin: No new rashes visible. Well perfused. No wounds visualized on exposed skin  Neuro: Nonfocal. Baseline. CN grossly intact. Moving all four with coordination. Psych: Normal behavior and affect. A:  - Nursing note reviewed. Ddx and MDM  Considered diagnoses  ***          My conversation with consultant reveals: NA ***      Decision rules:                           My read of the XR/CT scan reveals:  NA ***  CT head without contrast   Final Result      No acute intracranial abnormality. Moderate chronic small vessel ischemic changes. Workstation performed: [id number]         XR chest 1 view portable    (Results Pending)   CT chest abdomen pelvis w contrast    (Results Pending)       Orders Placed This Encounter   Procedures    COVID/FLU/RSV    XR chest 1 view portable    CT head without contrast    CT chest abdomen pelvis w contrast    CBC and differential    Comprehensive metabolic panel    UA w Reflex to Microscopic w Reflex to Culture    HS Troponin 0hr (reflex protocol)    HS Troponin I 2hr    RBC Morphology Reflex Test    Procalcitonin    Insert peripheral IV    Continuous pulse oximetry    Continuous cardiac monitoring     Labs Reviewed   CBC AND DIFFERENTIAL - Abnormal       Result Value Ref Range Status    WBC 20.58 (*) 4.31 - 10.16 Thousand/uL Final    RBC 3.84 (*) 3.88 - 5.62 Million/uL Final    Hemoglobin 12.5  12.0 - 17.0 g/dL Final    Hematocrit 37.6  36.5 - 49.3 % Final    MCV 98  82 - 98 fL Final    MCH 32.6  26.8 - 34.3 pg Final    MCHC 33.2  31.4 - 37.4 g/dL Final    RDW 13.9  11.6 - 15.1 % Final    MPV 12.7  8.9 - 12.7 fL Final    Platelets 014  629 - 390 Thousands/uL Final    Narrative: This is an appended report. These results have been appended to a previously verified report.    COMPREHENSIVE METABOLIC PANEL - Abnormal    Sodium 135  135 - 147 mmol/L Final    Potassium 4.3  3.5 - 5.3 mmol/L Final    Chloride 101  96 - 108 mmol/L Final    CO2 26  21 - 32 mmol/L Final    ANION GAP 8  mmol/L Final    BUN 24  5 - 25 mg/dL Final    Creatinine 1.12  0.60 - 1.30 mg/dL Final    Comment: Standardized to IDMS reference method    Glucose 165 (*) 65 - 140 mg/dL Final    Comment: If the patient is fasting, the ADA then defines impaired fasting glucose as > 100 mg/dL and diabetes as > or equal to 123 mg/dL. Calcium 8.9  8.4 - 10.2 mg/dL Final    AST 13  13 - 39 U/L Final    ALT 8  7 - 52 U/L Final    Comment: Specimen collection should occur prior to Sulfasalazine administration due to the potential for falsely depressed results. Alkaline Phosphatase 66  34 - 104 U/L Final    Total Protein 7.3  6.4 - 8.4 g/dL Final    Albumin 3.7  3.5 - 5.0 g/dL Final    Total Bilirubin 1.27 (*) 0.20 - 1.00 mg/dL Final    Comment: Use of this assay is not recommended for patients undergoing treatment with eltrombopag due to the potential for falsely elevated results. N-acetyl-p-benzoquinone imine (metabolite of Acetaminophen) will generate erroneously low results in samples for patients that have taken an overdose of Acetaminophen.     eGFR 55  ml/min/1.73sq m Final    Narrative:     [place] guidelines for Chronic Kidney Disease (CKD):     Stage 1 with normal or high GFR (GFR > 90 mL/min/1.73 square meters)    Stage 2 Mild CKD (GFR = 60-89 mL/min/1.73 square meters)    Stage 3A Moderate CKD (GFR = 45-59 mL/min/1.73 square meters)    Stage 3B Moderate CKD (GFR = 30-44 mL/min/1.73 square meters)    Stage 4 Severe CKD (GFR = 15-29 mL/min/1.73 square meters)    Stage 5 End Stage CKD (GFR <15 mL/min/1.73 square meters)  Note: GFR calculation is accurate only with a steady state creatinine   MANUAL DIFFERENTIAL(PHLEBS DO NOT ORDER) - Abnormal    Segmented % 73  43 - 75 % Final    Lymphocytes % 14  14 - 44 % Final    Monocytes % 7  4 - 12 % Final    Eosinophils, % 0  0 - 6 % Final    Basophils % 0  0 - 1 % Final    Atypical Lymphocytes % 6 (*) <=0 % Final Absolute Neutrophils 15.02 (*) 1.85 - 7.62 Thousand/uL Final    Lymphocytes Absolute 4.12  0.60 - 4.47 Thousand/uL Final    Monocytes Absolute 1.44 (*) 0.00 - 1.22 Thousand/uL Final    Eosinophils Absolute 0.00  0.00 - 0.40 Thousand/uL Final    Basophils Absolute 0.00  0.00 - 0.10 Thousand/uL Final    Total Counted     Final    RBC Morphology Present   Final    Platelet Estimate Adequate  Adequate Final    Large Platelet Present   Final    Anisocytosis Present   Final    Stomatocytes Present   Final   HS TROPONIN I 0HR - Normal    hs TnI 0hr 12  "Refer to ACS Flowchart"- see link ng/L Final    Comment:                                              Initial (time 0) result  If >=50 ng/L, Myocardial injury suggested ;  Type of myocardial injury and treatment strategy  to be determined. If 5-49 ng/L, a delta result at 2 hours and or 4 hours will be needed to further evaluate. If <4 ng/L, and chest pain has been >3 hours since onset, patient may qualify for discharge based on the HEART score in the ED. If <5 ng/L and <3hours since onset of chest pain, a delta result at 2 hours will be needed to further evaluate. HS Troponin 99th Percentile URL of a Health Population=12 ng/L with a 95% Confidence Interval of 8-18 ng/L. Second Troponin (time 2 hours)  If calculated delta >= 20 ng/L,  Myocardial injury suggested ; Type of myocardial injury and treatment strategy to be determined. If 5-49 ng/L and the calculated delta is 5-19 ng/L, consult medical service for evaluation. Continue evaluation for ischemia on ecg and other possible etiology and repeat hs troponin at 4 hours. If delta is <5 ng/L at 2 hours, consider discharge based on risk stratification via the HEART score (if in ED), or RIGOBERTO risk score in IP/Observation.     HS Troponin 99th Percentile URL of a Health Population=12 ng/L with a 95% Confidence Interval of 8-18 ng/L.   HS TROPONIN I 2HR - Normal    hs TnI 2hr 11  "Refer to ACS Flowchart"- see link ng/L Final    Comment:                                              Initial (time 0) result  If >=50 ng/L, Myocardial injury suggested ;  Type of myocardial injury and treatment strategy  to be determined. If 5-49 ng/L, a delta result at 2 hours and or 4 hours will be needed to further evaluate. If <4 ng/L, and chest pain has been >3 hours since onset, patient may qualify for discharge based on the HEART score in the ED. If <5 ng/L and <3hours since onset of chest pain, a delta result at 2 hours will be needed to further evaluate. HS Troponin 99th Percentile URL of a Health Population=12 ng/L with a 95% Confidence Interval of 8-18 ng/L. Second Troponin (time 2 hours)  If calculated delta >= 20 ng/L,  Myocardial injury suggested ; Type of myocardial injury and treatment strategy to be determined. If 5-49 ng/L and the calculated delta is 5-19 ng/L, consult medical service for evaluation. Continue evaluation for ischemia on ecg and other possible etiology and repeat hs troponin at 4 hours. If delta is <5 ng/L at 2 hours, consider discharge based on risk stratification via the HEART score (if in ED), or RIGOBERTO risk score in IP/Observation. HS Troponin 99th Percentile URL of a Health Population=12 ng/L with a 95% Confidence Interval of 8-18 ng/L. Delta 2hr hsTnI -1  <20 ng/L Final   COVID19, INFLUENZA A/B, RSV PCR, [facility]   RBC MORPHOLOGY REFLEX TEST   UA W REFLEX TO MICROSCOPIC WITH REFLEX TO CULTURE   PROCALCITONIN TEST       *Each of these labs was reviewed. Particular standout labs will be noted in the ED Course above     Final Diagnosis:  1.  Altered mental status          P:  - hospital tx includes ***  Medications   sodium chloride 0.9 % bolus 1,000 mL (1,000 mL Intravenous New Bag 11/11/23 0032)   sodium chloride 0.9 % bolus 1,000 mL (has no administration in time range)   iohexol (OMNIPAQUE) 350 MG/ML injection (MULTI-DOSE) 100 mL (100 mL Intravenous Given 11/10/23 5203)         - disposition***  Time reflects when diagnosis was documented in both MDM as applicable and the Disposition within this note       Time User Action Codes Description Comment    11/11/2023 12:56 AM Lossie Or Add [R41.82] Altered mental status           ED Disposition       ED Disposition   Admit    Condition   Stable    Date/Time   Sat Nov 11, 2023 12:56 AM    Comment   Case was discussed with LEA Anderson and the patient's admission status was agreed to be Admission Status: observation status to the service of Dr. Mode Llamas. Follow-up Information    None         - patient will call their PCP to let them know they were in the emergency department. We discuss return precautions and patient is agreeable with plan and aformentioned disposition. - additional treatment intended, if consistent with primary provider:  - patient to follow with :      Patient's Medications   Discharge Prescriptions    No medications on file     No discharge procedures on file. Prior to Admission Medications   Prescriptions Last Dose Informant Patient Reported?  Taking?   acetaminophen (TYLENOL) 325 mg tablet 11/10/2023  No Yes   Sig: Take 2 tablets (650 mg total) by mouth every 6 (six) hours as needed for mild pain   amLODIPine (NORVASC) 2.5 mg tablet   No No   Sig: Take 2 tablets (5 mg total) by mouth daily   atorvastatin (LIPITOR) 10 mg tablet 11/10/2023  Yes Yes   Sig: Take 5 mg by mouth daily   docusate sodium (COLACE) 100 mg capsule   No No   Sig: Take 1 capsule (100 mg total) by mouth 2 (two) times a day   lidocaine (LIDODERM) 5 %   Yes No   Sig: Apply 1 patch topically daily Remove & Discard patch within 12 hours or as directed by MD   magnesium hydroxide (MILK OF MAGNESIA) 400 mg/5 mL oral suspension   Yes No   Sig: Take by mouth daily as needed for constipation   melatonin 3 mg   No No   Sig: Take 1 tablet (3 mg total) by mouth daily at bedtime   metoprolol succinate (TOPROL-XL) 25 mg 24 hr tablet   No No   Sig: Take 1 tablet (25 mg total) by mouth daily at bedtime   oxybutynin (DITROPAN) 5 mg tablet   Yes No   Sig: Take 5 mg by mouth every other day   polyethylene glycol (MIRALAX) 17 g packet   Yes No   Sig: Take 17 g by mouth daily   prazosin (MINIPRESS) 1 mg capsule   Yes No   Sig: Take 1 mg by mouth daily at bedtime   sertraline (ZOLOFT) 50 mg tablet   Yes No   Sig: Take 50 mg by mouth daily      Facility-Administered Medications: None       Portions of the record may have been created with voice recognition software. Occasional wrong word or "sound a like" substitutions may have occurred due to the inherent limitations of voice recognition software. Read the chart carefully and recognize, using context, where substitutions have occurred.     Electronically signed by:  Sundeep Pratt MD

## 2023-11-11 NOTE — DISCHARGE INSTRUCTIONS
Call for urology followup regarding cote care    Call your PCP for follow up as needed    Urine is being sent for culture, if there needs to be changes in the antibiotics they'll be sent and you'll be called. If your father develops shortness of breath or looks like he's having difficulty breathing, return to the emergency department. If you are concerned that he's worsening rather than improving in the coming days, please return and we can go back to observation or treat otherwise as needed.      Best wishes

## 2023-11-11 NOTE — ED NOTES
Report given to [facility].  ESMETS  time [id number] Ririe Pita, RN  11/11/23 8030

## 2023-11-11 NOTE — ED PROVIDER NOTES
Patient signed out to me awaiting urinary studies to explore daughter's concern about mental status and leukocytosis. Per facility patient at baseline pleasant confusion and patient w/o complaints. Add covid testing. It's positive. Per daughter was positive previously then tested negative within a week. Bladder scan performed > 600cc  Dempsey placed. Nitrite pos urine. Ceftriaxone administered. Call daughter, leave message that patient to be d/c w/ oral abx. Daughter calls back asking that her father be admitted  I explain that he has a UTI with some confusion, that this is common and often treated outpatient. He lives in a facility that can administer his medications. I explain that confusion often escalates for patients admitted because of vitals checks and frequent awakenings, strange surroundings, and unfamiliar faces. I explain that in hospital mortality for encephalopathy is quite high. I explain between that and hospital acquired infection he's more likely to be harmed that helped by admission. She says "I wouldn't mind if he was admitted for a couple nights". I say "If you feel strongly I'll discuss with the inpatient team but if you are impartial then SNF would be better"  She says she wants him admitted. I discuss with inpatient team. They call and discuss with the daughter as well who tells them that they are ok if he's watched in the ED until morning, which we can accommodate.         Joe Litten, MD  11/11/23 4666

## 2023-11-11 NOTE — ED NOTES
This RN along with Yasmine Soni, Nick Jimenez ED Tech, and Fei GIBBS Tech went in to place cote catheter per order. Patient tolerated well. Pt had a bowel movement right after placement. Pt cleaned, given new blankets and gown, now resting comfortably.  Pt offers no c/o at this time     Tiffany Ahuja RN  11/11/23 0937

## 2023-11-13 LAB
ATRIAL RATE: 84 BPM
BACTERIA UR CULT: ABNORMAL
P AXIS: 89 DEGREES
PR INTERVAL: 312 MS
QRS AXIS: -49 DEGREES
QRSD INTERVAL: 104 MS
QT INTERVAL: 372 MS
QTC INTERVAL: 439 MS
T WAVE AXIS: -63 DEGREES
VENTRICULAR RATE: 84 BPM

## 2023-11-13 PROCEDURE — 93010 ELECTROCARDIOGRAM REPORT: CPT | Performed by: INTERNAL MEDICINE

## 2023-12-08 ENCOUNTER — TELEPHONE (OUTPATIENT)
Dept: NEUROLOGY | Facility: CLINIC | Age: 88
End: 2023-12-08

## 2023-12-08 NOTE — TELEPHONE ENCOUNTER
I spoke to pts son Re: 12/22 appointment. Patient son stated he was not aware of the appointment and Patient is in [facility] but he will check with his sister and call the office back.